# Patient Record
Sex: FEMALE | Race: WHITE | NOT HISPANIC OR LATINO | Employment: FULL TIME | URBAN - METROPOLITAN AREA
[De-identification: names, ages, dates, MRNs, and addresses within clinical notes are randomized per-mention and may not be internally consistent; named-entity substitution may affect disease eponyms.]

---

## 2017-06-22 ENCOUNTER — TRANSCRIBE ORDERS (OUTPATIENT)
Dept: ADMINISTRATIVE | Facility: HOSPITAL | Age: 72
End: 2017-06-22

## 2017-06-22 ENCOUNTER — HOSPITAL ENCOUNTER (OUTPATIENT)
Dept: GENERAL RADIOLOGY | Facility: HOSPITAL | Age: 72
Discharge: HOME | End: 2017-06-22
Admitting: NURSE PRACTITIONER

## 2017-06-22 DIAGNOSIS — R05.9 COUGH: Primary | ICD-10-CM

## 2017-06-22 DIAGNOSIS — R05.9 COUGH: ICD-10-CM

## 2017-06-22 PROCEDURE — 71020 HC CHEST PA AND LATERAL: CPT

## 2018-03-08 ENCOUNTER — OFFICE VISIT (OUTPATIENT)
Dept: INTERNAL MEDICINE | Facility: CLINIC | Age: 73
End: 2018-03-08

## 2018-03-08 VITALS
BODY MASS INDEX: 21.44 KG/M2 | OXYGEN SATURATION: 98 % | HEART RATE: 55 BPM | DIASTOLIC BLOOD PRESSURE: 78 MMHG | HEIGHT: 63 IN | SYSTOLIC BLOOD PRESSURE: 124 MMHG | WEIGHT: 121 LBS

## 2018-03-08 DIAGNOSIS — R73.03 PREDIABETES: ICD-10-CM

## 2018-03-08 DIAGNOSIS — E78.5 HYPERLIPIDEMIA, UNSPECIFIED HYPERLIPIDEMIA TYPE: Primary | ICD-10-CM

## 2018-03-08 DIAGNOSIS — I10 HYPERTENSION, UNSPECIFIED TYPE: ICD-10-CM

## 2018-03-08 LAB
ALBUMIN SERPL-MCNC: 5 G/DL (ref 3.5–5.2)
ALBUMIN/GLOB SERPL: 2.3 G/DL
ALP SERPL-CCNC: 89 U/L (ref 39–117)
ALT SERPL-CCNC: 16 U/L (ref 1–33)
AST SERPL-CCNC: 18 U/L (ref 1–32)
BILIRUB SERPL-MCNC: 0.4 MG/DL (ref 0.1–1.2)
BUN SERPL-MCNC: 17 MG/DL (ref 8–23)
BUN/CREAT SERPL: 23.9 (ref 7–25)
CALCIUM SERPL-MCNC: 10.1 MG/DL (ref 8.6–10.5)
CHLORIDE SERPL-SCNC: 103 MMOL/L (ref 98–107)
CHOLEST SERPL-MCNC: 168 MG/DL (ref 0–200)
CO2 SERPL-SCNC: 29.9 MMOL/L (ref 22–29)
CREAT SERPL-MCNC: 0.71 MG/DL (ref 0.57–1)
GFR SERPLBLD CREATININE-BSD FMLA CKD-EPI: 81 ML/MIN/1.73
GFR SERPLBLD CREATININE-BSD FMLA CKD-EPI: 98 ML/MIN/1.73
GLOBULIN SER CALC-MCNC: 2.2 GM/DL
GLUCOSE SERPL-MCNC: 88 MG/DL (ref 65–99)
HBA1C MFR BLD: 5.74 % (ref 4.8–5.6)
HDLC SERPL-MCNC: 57 MG/DL (ref 40–60)
LDLC SERPL CALC-MCNC: 92 MG/DL (ref 0–100)
LDLC/HDLC SERPL: 1.62 {RATIO}
POTASSIUM SERPL-SCNC: 4.3 MMOL/L (ref 3.5–5.2)
PROT SERPL-MCNC: 7.2 G/DL (ref 6–8.5)
SODIUM SERPL-SCNC: 145 MMOL/L (ref 136–145)
TRIGL SERPL-MCNC: 93 MG/DL (ref 0–150)
VLDLC SERPL CALC-MCNC: 18.6 MG/DL (ref 5–40)

## 2018-03-08 PROCEDURE — 99204 OFFICE O/P NEW MOD 45 MIN: CPT | Performed by: FAMILY MEDICINE

## 2018-03-08 RX ORDER — ACETAMINOPHEN 325 MG/1
650 TABLET ORAL DAILY PRN
COMMUNITY

## 2018-03-08 RX ORDER — UBIDECARENONE 100 MG
200 CAPSULE ORAL DAILY
COMMUNITY

## 2018-03-08 RX ORDER — FLUTICASONE PROPIONATE 50 MCG
2 SPRAY, SUSPENSION (ML) NASAL DAILY
COMMUNITY
End: 2018-09-07

## 2018-03-08 RX ORDER — SODIUM PHOSPHATE,MONO-DIBASIC 19G-7G/118
1500 ENEMA (ML) RECTAL DAILY
COMMUNITY

## 2018-03-08 RX ORDER — MELOXICAM 15 MG/1
1 TABLET ORAL DAILY
Refills: 5 | COMMUNITY
Start: 2018-02-02 | End: 2018-08-02 | Stop reason: SDUPTHER

## 2018-03-08 RX ORDER — ACETAMINOPHEN,DIPHENHYDRAMINE HCL 500; 25 MG/1; MG/1
1 TABLET, FILM COATED ORAL NIGHTLY PRN
COMMUNITY

## 2018-03-08 RX ORDER — PRAVASTATIN SODIUM 20 MG
1 TABLET ORAL DAILY
Refills: 5 | COMMUNITY
Start: 2018-02-02 | End: 2018-05-02 | Stop reason: SDUPTHER

## 2018-03-08 NOTE — PROGRESS NOTES
Subjective   Gaby Jackson is a 72 y.o. female.     Chief Complaint   Patient presents with   • New Patient Visit     previous PCP: JOHN Chavez   • Hypertension   • Hyperlipidemia   • Asthma   • Arthritis         History of Present Illness     Past medical history of hypertension.  Patient's currently taken hydrochlorothiazide 12.5 mg daily.  He is also taking metoprolol succinate XL 50 mg daily.  She currently denies any side effects of medication.  She notes that she is doing well with this medicine.    Patient's past medical history of hyperlipidemia.  She is currently taking atorvastatin 20 mg daily.  She notes that she is doing well on this medication.    Patient notes that her prior hemoglobin A1c was 5.9, was told that she had prediabetes.    The following portions of the patient's history were reviewed and updated as appropriate: allergies, current medications, past family history, past medical history, past social history, past surgical history and problem list.    Review of Systems   Constitutional: Negative for chills and fever.   HENT: Negative for congestion, rhinorrhea, sinus pain and sore throat.    Eyes: Negative for photophobia and visual disturbance.   Respiratory: Negative for cough, chest tightness and shortness of breath.    Cardiovascular: Negative for chest pain and palpitations.   Gastrointestinal: Negative for diarrhea, nausea and vomiting.   Genitourinary: Negative for dysuria, frequency and urgency.   Skin: Negative for rash and wound.   Neurological: Negative for dizziness and syncope.   Psychiatric/Behavioral: Negative for behavioral problems and confusion.       Objective   Physical Exam   Constitutional: She is oriented to person, place, and time. She appears well-developed and well-nourished.   HENT:   Head: Normocephalic and atraumatic.   Right Ear: External ear normal.   Left Ear: External ear normal.   Mouth/Throat: Oropharynx is clear and moist.   Eyes: EOM are normal.    Neck: Normal range of motion. Neck supple.   Cardiovascular: Normal rate, regular rhythm and normal heart sounds.    Pulmonary/Chest: Effort normal and breath sounds normal. No respiratory distress.   Musculoskeletal: Normal range of motion.   Lymphadenopathy:     She has no cervical adenopathy.   Neurological: She is alert and oriented to person, place, and time.   Skin: Skin is warm.   Psychiatric: She has a normal mood and affect. Her behavior is normal.   Nursing note and vitals reviewed.      Assessment/Plan   Gaby was seen today for new patient visit, hypertension, hyperlipidemia, asthma and arthritis.    Diagnoses and all orders for this visit:    Hyperlipidemia, unspecified hyperlipidemia type  -     Lipid Panel With LDL / HDL Ratio  -     Continue pravastatin 20 mg daily.    Hypertension, unspecified type  -     Comprehensive Metabolic Panel  -     We'll treat with metoprolol succinate XL daily hydrochlorothiazide 12.5 mg daily.    Prediabetes  -     Hemoglobin A1c          No Follow-up on file.    Dictated utilizing Dragon Voice Recognition Software

## 2018-03-09 ENCOUNTER — TELEPHONE (OUTPATIENT)
Dept: INTERNAL MEDICINE | Facility: CLINIC | Age: 73
End: 2018-03-09

## 2018-03-09 NOTE — TELEPHONE ENCOUNTER
----- Message from Maicol Florentino MD sent at 3/9/2018 11:07 AM EST -----  The labs were reviewed. Please inform patient that labs were normal.

## 2018-05-03 RX ORDER — PRAVASTATIN SODIUM 20 MG
TABLET ORAL
Qty: 90 TABLET | Refills: 1 | Status: SHIPPED | OUTPATIENT
Start: 2018-05-03 | End: 2018-09-10 | Stop reason: DRUGHIGH

## 2018-05-03 RX ORDER — METOPROLOL SUCCINATE 50 MG/1
TABLET, EXTENDED RELEASE ORAL
Qty: 90 TABLET | Refills: 1 | Status: SHIPPED | OUTPATIENT
Start: 2018-05-03 | End: 2018-10-29 | Stop reason: SDUPTHER

## 2018-06-01 RX ORDER — HYDROCHLOROTHIAZIDE 25 MG/1
12.5 TABLET ORAL DAILY
Qty: 45 TABLET | Refills: 1 | Status: SHIPPED | OUTPATIENT
Start: 2018-06-01 | End: 2018-11-28 | Stop reason: SDUPTHER

## 2018-08-02 RX ORDER — MELOXICAM 15 MG/1
TABLET ORAL
Qty: 90 TABLET | Refills: 1 | Status: SHIPPED | OUTPATIENT
Start: 2018-08-02 | End: 2019-01-30 | Stop reason: SDUPTHER

## 2018-09-07 ENCOUNTER — OFFICE VISIT (OUTPATIENT)
Dept: INTERNAL MEDICINE | Facility: CLINIC | Age: 73
End: 2018-09-07

## 2018-09-07 VITALS
OXYGEN SATURATION: 97 % | SYSTOLIC BLOOD PRESSURE: 134 MMHG | DIASTOLIC BLOOD PRESSURE: 80 MMHG | BODY MASS INDEX: 21.72 KG/M2 | WEIGHT: 122.6 LBS | HEART RATE: 74 BPM | HEIGHT: 63 IN

## 2018-09-07 DIAGNOSIS — Z13.29 SCREENING FOR THYROID DISORDER: ICD-10-CM

## 2018-09-07 DIAGNOSIS — E78.5 HYPERLIPIDEMIA, UNSPECIFIED HYPERLIPIDEMIA TYPE: ICD-10-CM

## 2018-09-07 DIAGNOSIS — Z12.39 SCREENING FOR BREAST CANCER: ICD-10-CM

## 2018-09-07 DIAGNOSIS — Z00.00 MEDICARE ANNUAL WELLNESS VISIT, INITIAL: Primary | ICD-10-CM

## 2018-09-07 DIAGNOSIS — E55.9 AVITAMINOSIS D: ICD-10-CM

## 2018-09-07 DIAGNOSIS — I10 HYPERTENSION, UNSPECIFIED TYPE: ICD-10-CM

## 2018-09-07 DIAGNOSIS — R73.9 BLOOD GLUCOSE ELEVATED: ICD-10-CM

## 2018-09-07 DIAGNOSIS — Z11.59 ENCOUNTER FOR HEPATITIS C SCREENING TEST FOR LOW RISK PATIENT: ICD-10-CM

## 2018-09-07 PROBLEM — R53.83 FATIGUE: Status: ACTIVE | Noted: 2018-09-07

## 2018-09-07 PROBLEM — R51.9 CEPHALALGIA: Status: ACTIVE | Noted: 2018-09-07

## 2018-09-07 PROCEDURE — G0439 PPPS, SUBSEQ VISIT: HCPCS | Performed by: FAMILY MEDICINE

## 2018-09-07 RX ORDER — MINOCYCLINE HYDROCHLORIDE 50 MG/1
50 CAPSULE ORAL DAILY
Refills: 0 | COMMUNITY
Start: 2018-09-03

## 2018-09-07 RX ORDER — CHLORAL HYDRATE 500 MG
1000 CAPSULE ORAL
COMMUNITY

## 2018-09-07 NOTE — PROGRESS NOTES
QUICK REFERENCE INFORMATION:  The ABCs of the Annual Wellness Visit    Initial Medicare Wellness Visit    HEALTH RISK ASSESSMENT    1945    Recent Hospitalizations:  No hospitalization(s) within the last year..        Current Medical Providers:  Patient Care Team:  Maicol Florentino MD as PCP - General (Family Medicine)  Marty Atkinson MD as PCP - Claims Attributed        Smoking Status:  History   Smoking Status   • Never Smoker   Smokeless Tobacco   • Never Used       Alcohol Consumption:  History   Alcohol Use No       Depression Screen:   PHQ-2/PHQ-9 Depression Screening 9/7/2018   Little interest or pleasure in doing things 0   Feeling down, depressed, or hopeless 0   Trouble falling or staying asleep, or sleeping too much 0   Feeling tired or having little energy 1   Poor appetite or overeating 1   Feeling bad about yourself - or that you are a failure or have let yourself or your family down 1   Trouble concentrating on things, such as reading the newspaper or watching television 0   Moving or speaking so slowly that other people could have noticed. Or the opposite - being so fidgety or restless that you have been moving around a lot more than usual 0   Thoughts that you would be better off dead, or of hurting yourself in some way 0   Total Score 3   If you checked off any problems, how difficult have these problems made it for you to do your work, take care of things at home, or get along with other people? Not difficult at all       Health Habits and Functional and Cognitive Screening:  Functional & Cognitive Status 9/7/2018   Do you have difficulty preparing food and eating? No   Do you have difficulty bathing yourself, getting dressed or grooming yourself? No   Do you have difficulty using the toilet? No   Do you have difficulty moving around from place to place? No   Do you have trouble with steps or getting out of a bed or a chair? No   In the past year have you fallen or experienced a near fall?  No   Current Diet Well Balanced Diet   Dental Exam Up to date   Eye Exam Not up to date   Exercise (times per week) 7 times per week   Current Exercise Activities Include Walking   Do you need help using the phone?  No   Are you deaf or do you have serious difficulty hearing?  No   Do you need help with transportation? No   Do you need help shopping? No   Do you need help preparing meals?  No   Do you need help with housework?  No   Do you need help with laundry? No   Do you need help taking your medications? No   Do you need help managing money? No   Do you ever drive or ride in a car without wearing a seat belt? No   Have you felt unusual stress, anger or loneliness in the last month? No   Who do you live with? Alone   If you need help, do you have trouble finding someone available to you? No   Have you been bothered in the last four weeks by sexual problems? No   Do you have difficulty concentrating, remembering or making decisions? No           Does the patient have evidence of cognitive impairment? No    Asiprin use counseling: Start ASA 81 mg daily       Recent Lab Results:    Visual Acuity:  No exam data present    Age-appropriate Screening Schedule:  Refer to the list below for future screening recommendations based on patient's age, sex and/or medical conditions. Orders for these recommended tests are listed in the plan section. The patient has been provided with a written plan.    Health Maintenance   Topic Date Due   • TDAP/TD VACCINES (1 - Tdap) 09/03/1965   • ZOSTER VACCINE (1 of 2) 09/03/1996   • PNEUMOCOCCAL VACCINES (65+ LOW/MEDIUM RISK) (2 of 2 - PPSV23) 01/01/2012   • MAMMOGRAM  03/08/2018   • COLONOSCOPY  03/08/2018   • INFLUENZA VACCINE  08/01/2018   • LIPID PANEL  03/08/2019        Subjective   History of Present Illness    Gaby Jackson is a 73 y.o. female who presents for an Annual Wellness Visit.    The following portions of the patient's history were reviewed and updated as appropriate:  allergies, current medications, past family history, past medical history, past social history, past surgical history and problem list.    Outpatient Medications Prior to Visit   Medication Sig Dispense Refill   • acetaminophen (TYLENOL) 325 MG tablet Take 650 mg by mouth Daily As Needed for Mild Pain .     • amitriptyline (ELAVIL) 10 MG tablet Take 10 mg by mouth At Night As Needed.     • BIOTIN PO Take 2 tablets by mouth Daily.     • BREO ELLIPTA 200-25 MCG/INH aerosol powder  1 PUFF INHALED DAILY 180 each 1   • Cholecalciferol (VITAMIN D-3 PO) Take 1 tablet by mouth Daily.     • coenzyme Q10 100 MG capsule Take 200 mg by mouth Daily.     • diphenhydrAMINE-acetaminophen (TYLENOL PM)  MG tablet per tablet Take 1 tablet by mouth At Night As Needed for Sleep.     • glucosamine sulfate 500 MG capsule capsule Take 1,500 mg by mouth Daily.     • hydrochlorothiazide (HYDRODIURIL) 25 MG tablet Take 0.5 tablets by mouth Daily. 45 tablet 1   • meloxicam (MOBIC) 15 MG tablet TAKE 1 TABLET BY MOUTH ONCE A DAY FOR 30 DAYS 90 tablet 1   • metoprolol succinate XL (TOPROL-XL) 50 MG 24 hr tablet TAKE ONE TABLET BY MOUTH ONCE DAILY ONCE A DAY ORALLY 90 DAYS 90 tablet 1   • pravastatin (PRAVACHOL) 20 MG tablet 1 TABLET ONCE A DAY ORALLY 30 DAYS 90 tablet 1   • fluticasone (FLONASE) 50 MCG/ACT nasal spray 2 sprays into each nostril Daily.       No facility-administered medications prior to visit.        Patient Active Problem List   Diagnosis   • Hypertension   • Hyperlipidemia   • Avitaminosis D   • Diplopia   • Labyrinthitis of left ear   • Fatigue   • Cephalalgia   • Blood glucose elevated       Advance Care Planning:  has NO advance directive - information provided to the patient today    Identification of Risk Factors:  Risk factors include: unhealthy diet, cardiovascular risk and inactivity.    Review of Systems    Compared to one year ago, the patient feels her physical health is the same.  Compared to one year ago, the  "patient feels her mental health is the same.    Objective     Physical Exam    Vitals:    09/07/18 0856   BP: 134/80   Pulse: 74   SpO2: 97%   Weight: 55.6 kg (122 lb 9.6 oz)   Height: 160 cm (63\")   PainSc: 0-No pain       Patient's Body mass index is 21.72 kg/m². BMI is within normal parameters. No follow-up required.      Assessment/Plan   Patient Self-Management and Personalized Health Advice  The patient has been provided with information about: diet, exercise, weight management, prevention of cardiac or vascular disease and designing advance directives and preventive services including:   · Advance directive, Colorectal cancer screening, colonoscopy referral placed, Diabetes screening, see lab orders, Exercise counseling provided, Influenza vaccine, Nutrition counseling provided, Pneumococcal vaccine , Screening mammography, referral placed, TdaP vaccine.    Visit Diagnoses:    ICD-10-CM ICD-9-CM   1. Medicare annual wellness visit, initial Z00.00 V70.0   2. Screening for breast cancer Z12.31 V76.10   3. Avitaminosis D E55.9 268.9   4. Hypertension, unspecified type I10 401.9   5. Hyperlipidemia, unspecified hyperlipidemia type E78.5 272.4   6. Blood glucose elevated R73.9 790.29   7. Screening for thyroid disorder Z13.29 V77.0   8. Encounter for hepatitis C screening test for low risk patient Z11.59 V73.89       Orders Placed This Encounter   Procedures   • Mammo Screening Bilateral With CAD     Order Specific Question:   Reason for Exam:     Answer:   screening for breast cancer   • Comprehensive Metabolic Panel   • Thyroid Panel With TSH   • Lipid Panel With LDL / HDL Ratio   • Vitamin D 25 Hydroxy   • Hemoglobin A1c   • Hepatitis C Antibody   • CBC & Differential     Order Specific Question:   Manual Differential     Answer:   No       Outpatient Encounter Prescriptions as of 9/7/2018   Medication Sig Dispense Refill   • acetaminophen (TYLENOL) 325 MG tablet Take 650 mg by mouth Daily As Needed for Mild " Pain .     • amitriptyline (ELAVIL) 10 MG tablet Take 10 mg by mouth At Night As Needed.     • BIOTIN PO Take 2 tablets by mouth Daily.     • BREO ELLIPTA 200-25 MCG/INH aerosol powder  1 PUFF INHALED DAILY 180 each 1   • Cholecalciferol (VITAMIN D-3 PO) Take 1 tablet by mouth Daily.     • coenzyme Q10 100 MG capsule Take 200 mg by mouth Daily.     • diphenhydrAMINE-acetaminophen (TYLENOL PM)  MG tablet per tablet Take 1 tablet by mouth At Night As Needed for Sleep.     • glucosamine sulfate 500 MG capsule capsule Take 1,500 mg by mouth Daily.     • hydrochlorothiazide (HYDRODIURIL) 25 MG tablet Take 0.5 tablets by mouth Daily. 45 tablet 1   • meloxicam (MOBIC) 15 MG tablet TAKE 1 TABLET BY MOUTH ONCE A DAY FOR 30 DAYS 90 tablet 1   • metoprolol succinate XL (TOPROL-XL) 50 MG 24 hr tablet TAKE ONE TABLET BY MOUTH ONCE DAILY ONCE A DAY ORALLY 90 DAYS 90 tablet 1   • Omega-3 Fatty Acids (FISH OIL) 1000 MG capsule capsule Take 1,000 mg by mouth Daily With Breakfast.     • pravastatin (PRAVACHOL) 20 MG tablet 1 TABLET ONCE A DAY ORALLY 30 DAYS 90 tablet 1   • Probiotic Product (PROBIOTIC PO) Take 1 tablet by mouth Daily.     • minocycline (MINOCIN,DYNACIN) 50 MG capsule Take 50 mg by mouth Daily.  0   • [DISCONTINUED] fluticasone (FLONASE) 50 MCG/ACT nasal spray 2 sprays into each nostril Daily.       No facility-administered encounter medications on file as of 9/7/2018.        Reviewed use of high risk medication in the elderly: yes  Reviewed for potential of harmful drug interactions in the elderly: yes    Follow Up:  No Follow-up on file.     An After Visit Summary and PPPS with all of these plans were given to the patient.

## 2018-09-08 LAB
25(OH)D3+25(OH)D2 SERPL-MCNC: 36.4 NG/ML (ref 30–100)
ALBUMIN SERPL-MCNC: 5 G/DL (ref 3.5–5.2)
ALBUMIN/GLOB SERPL: 2.4 G/DL
ALP SERPL-CCNC: 95 U/L (ref 39–117)
ALT SERPL-CCNC: 20 U/L (ref 1–33)
AST SERPL-CCNC: 20 U/L (ref 1–32)
BASOPHILS # BLD AUTO: 0.04 10*3/MM3 (ref 0–0.2)
BASOPHILS NFR BLD AUTO: 0.7 % (ref 0–1.5)
BILIRUB SERPL-MCNC: 0.5 MG/DL (ref 0.1–1.2)
BUN SERPL-MCNC: 21 MG/DL (ref 8–23)
BUN/CREAT SERPL: 28.4 (ref 7–25)
CALCIUM SERPL-MCNC: 10 MG/DL (ref 8.6–10.5)
CHLORIDE SERPL-SCNC: 101 MMOL/L (ref 98–107)
CHOLEST SERPL-MCNC: 201 MG/DL (ref 0–200)
CO2 SERPL-SCNC: 28.8 MMOL/L (ref 22–29)
CREAT SERPL-MCNC: 0.74 MG/DL (ref 0.57–1)
EOSINOPHIL # BLD AUTO: 0.06 10*3/MM3 (ref 0–0.7)
EOSINOPHIL NFR BLD AUTO: 1.1 % (ref 0.3–6.2)
ERYTHROCYTE [DISTWIDTH] IN BLOOD BY AUTOMATED COUNT: 13.6 % (ref 11.7–13)
FT4I SERPL CALC-MCNC: 1.8 (ref 1.2–4.9)
GLOBULIN SER CALC-MCNC: 2.1 GM/DL
GLUCOSE SERPL-MCNC: 85 MG/DL (ref 65–99)
HBA1C MFR BLD: 5.39 % (ref 4.8–5.6)
HCT VFR BLD AUTO: 38.3 % (ref 35.6–45.5)
HCV AB S/CO SERPL IA: 0.1 S/CO RATIO (ref 0–0.9)
HDLC SERPL-MCNC: 66 MG/DL (ref 40–60)
HGB BLD-MCNC: 12.2 G/DL (ref 11.9–15.5)
IMM GRANULOCYTES # BLD: 0 10*3/MM3 (ref 0–0.03)
IMM GRANULOCYTES NFR BLD: 0 % (ref 0–0.5)
LDLC SERPL CALC-MCNC: 122 MG/DL (ref 0–100)
LDLC/HDLC SERPL: 1.84 {RATIO}
LYMPHOCYTES # BLD AUTO: 0.92 10*3/MM3 (ref 0.9–4.8)
LYMPHOCYTES NFR BLD AUTO: 16.3 % (ref 19.6–45.3)
MCH RBC QN AUTO: 28.4 PG (ref 26.9–32)
MCHC RBC AUTO-ENTMCNC: 31.9 G/DL (ref 32.4–36.3)
MCV RBC AUTO: 89.3 FL (ref 80.5–98.2)
MONOCYTES # BLD AUTO: 0.65 10*3/MM3 (ref 0.2–1.2)
MONOCYTES NFR BLD AUTO: 11.5 % (ref 5–12)
NEUTROPHILS # BLD AUTO: 3.96 10*3/MM3 (ref 1.9–8.1)
NEUTROPHILS NFR BLD AUTO: 70.4 % (ref 42.7–76)
PLATELET # BLD AUTO: 276 10*3/MM3 (ref 140–500)
POTASSIUM SERPL-SCNC: 4.2 MMOL/L (ref 3.5–5.2)
PROT SERPL-MCNC: 7.1 G/DL (ref 6–8.5)
RBC # BLD AUTO: 4.29 10*6/MM3 (ref 3.9–5.2)
SODIUM SERPL-SCNC: 141 MMOL/L (ref 136–145)
T3RU NFR SERPL: 24 % (ref 24–39)
T4 SERPL-MCNC: 7.5 UG/DL (ref 4.5–12)
TRIGL SERPL-MCNC: 67 MG/DL (ref 0–150)
TSH SERPL DL<=0.005 MIU/L-ACNC: 2.88 UIU/ML (ref 0.45–4.5)
VLDLC SERPL CALC-MCNC: 13.4 MG/DL (ref 5–40)
WBC # BLD AUTO: 5.63 10*3/MM3 (ref 4.5–10.7)

## 2018-09-10 ENCOUNTER — TELEPHONE (OUTPATIENT)
Dept: INTERNAL MEDICINE | Facility: CLINIC | Age: 73
End: 2018-09-10

## 2018-09-10 RX ORDER — PRAVASTATIN SODIUM 40 MG
40 TABLET ORAL DAILY
Qty: 90 TABLET | Refills: 1 | Status: SHIPPED | OUTPATIENT
Start: 2018-09-10 | End: 2019-05-02 | Stop reason: SDUPTHER

## 2018-09-10 NOTE — TELEPHONE ENCOUNTER
----- Message from Maicol Florentino MD sent at 9/10/2018 10:39 AM EDT -----  Please inform the patient of the following abnormal results.  Increase pravastatin to 40 mg daily.

## 2018-10-29 RX ORDER — METOPROLOL SUCCINATE 50 MG/1
50 TABLET, EXTENDED RELEASE ORAL DAILY
Qty: 90 TABLET | Refills: 1 | Status: SHIPPED | OUTPATIENT
Start: 2018-10-29 | End: 2019-04-26 | Stop reason: SDUPTHER

## 2018-11-28 RX ORDER — HYDROCHLOROTHIAZIDE 25 MG/1
12.5 TABLET ORAL DAILY
Qty: 45 TABLET | Refills: 1 | Status: SHIPPED | OUTPATIENT
Start: 2018-11-28 | End: 2019-05-19 | Stop reason: SDUPTHER

## 2019-01-30 RX ORDER — MELOXICAM 15 MG/1
TABLET ORAL
Qty: 90 TABLET | Refills: 1 | Status: SHIPPED | OUTPATIENT
Start: 2019-01-30 | End: 2019-07-17 | Stop reason: SDUPTHER

## 2019-03-07 ENCOUNTER — OFFICE VISIT (OUTPATIENT)
Dept: INTERNAL MEDICINE | Facility: CLINIC | Age: 74
End: 2019-03-07

## 2019-03-07 VITALS
WEIGHT: 124.3 LBS | DIASTOLIC BLOOD PRESSURE: 80 MMHG | BODY MASS INDEX: 22.02 KG/M2 | OXYGEN SATURATION: 95 % | SYSTOLIC BLOOD PRESSURE: 142 MMHG | HEIGHT: 63 IN | HEART RATE: 51 BPM

## 2019-03-07 DIAGNOSIS — I10 HYPERTENSION, UNSPECIFIED TYPE: ICD-10-CM

## 2019-03-07 DIAGNOSIS — M17.0 PRIMARY OSTEOARTHRITIS OF BOTH KNEES: ICD-10-CM

## 2019-03-07 DIAGNOSIS — E78.5 HYPERLIPIDEMIA, UNSPECIFIED HYPERLIPIDEMIA TYPE: Primary | ICD-10-CM

## 2019-03-07 LAB
ALBUMIN SERPL-MCNC: 4.4 G/DL (ref 3.5–5.2)
ALBUMIN/GLOB SERPL: 2 G/DL
ALP SERPL-CCNC: 85 U/L (ref 39–117)
ALT SERPL-CCNC: 18 U/L (ref 1–33)
AST SERPL-CCNC: 18 U/L (ref 1–32)
BILIRUB SERPL-MCNC: 0.4 MG/DL (ref 0.1–1.2)
BUN SERPL-MCNC: 17 MG/DL (ref 8–23)
BUN/CREAT SERPL: 23.9 (ref 7–25)
CALCIUM SERPL-MCNC: 10.1 MG/DL (ref 8.6–10.5)
CHLORIDE SERPL-SCNC: 102 MMOL/L (ref 98–107)
CHOLEST SERPL-MCNC: 166 MG/DL (ref 0–200)
CO2 SERPL-SCNC: 28.2 MMOL/L (ref 22–29)
CREAT SERPL-MCNC: 0.71 MG/DL (ref 0.57–1)
GLOBULIN SER CALC-MCNC: 2.2 GM/DL
GLUCOSE SERPL-MCNC: 88 MG/DL (ref 65–99)
HDLC SERPL-MCNC: 54 MG/DL (ref 40–60)
LDLC SERPL CALC-MCNC: 94 MG/DL (ref 0–100)
LDLC/HDLC SERPL: 1.74 {RATIO}
POTASSIUM SERPL-SCNC: 4.1 MMOL/L (ref 3.5–5.2)
PROT SERPL-MCNC: 6.6 G/DL (ref 6–8.5)
SODIUM SERPL-SCNC: 142 MMOL/L (ref 136–145)
TRIGL SERPL-MCNC: 90 MG/DL (ref 0–150)
VLDLC SERPL CALC-MCNC: 18 MG/DL (ref 5–40)

## 2019-03-07 PROCEDURE — 99214 OFFICE O/P EST MOD 30 MIN: CPT | Performed by: FAMILY MEDICINE

## 2019-03-07 RX ORDER — TRIAMCINOLONE ACETONIDE 1 MG/G
CREAM TOPICAL
Refills: 0 | COMMUNITY
Start: 2019-01-24

## 2019-03-07 NOTE — PROGRESS NOTES
Subjective   Gaby Jackson is a 73 y.o. female.     Chief Complaint   Patient presents with   • Hypertension     6 month follow up   • Hyperlipidemia     6 month follow up   • Headache         History of Present Illness     Patient is a past medical history for hyperlipidemia.  Patient is currently stable.  She is currently taking pravastatin 40 mg daily.  She denies any side effects of the medication.    Patient has essential hypertension.  Patient is currently taking hydrochlorothiazide 12.5 mg daily and metoprolol succinate XL 50 mg daily.  She denies any side effects of the medication.  Patient blood pressure is 142/80.  Patient states the medication seems to be working well for her.  Blood pressure is slightly elevated, could be related to her arthritic pain.    She states that she has pain in her knees due to osteoarthritis.  Patient states that she is currently taking meloxicam 15 mg daily, which seems to be helping her.  She states that it is not bad to the point where she needs to get steroid injections.  She states is worse after standing long days at work.    The following portions of the patient's history were reviewed and updated as appropriate: allergies, current medications, past family history, past medical history, past social history, past surgical history and problem list.    Review of Systems   Constitutional: Negative for chills and fever.   HENT: Negative for congestion, rhinorrhea, sinus pain and sore throat.    Eyes: Negative for photophobia and visual disturbance.   Respiratory: Negative for cough, chest tightness and shortness of breath.    Cardiovascular: Negative for chest pain and palpitations.   Gastrointestinal: Negative for diarrhea, nausea and vomiting.   Genitourinary: Negative for dysuria, frequency and urgency.   Skin: Negative for rash and wound.   Neurological: Negative for dizziness and syncope.   Psychiatric/Behavioral: Negative for behavioral problems and confusion.        Objective   Physical Exam   Constitutional: She is oriented to person, place, and time. She appears well-developed and well-nourished.   HENT:   Head: Normocephalic and atraumatic.   Right Ear: External ear normal.   Left Ear: External ear normal.   Mouth/Throat: Oropharynx is clear and moist.   Eyes: EOM are normal.   Neck: Normal range of motion. Neck supple.   Cardiovascular: Normal rate, regular rhythm and normal heart sounds.   Pulmonary/Chest: Effort normal and breath sounds normal. No respiratory distress.   Musculoskeletal: Normal range of motion.   Lymphadenopathy:     She has no cervical adenopathy.   Neurological: She is alert and oriented to person, place, and time.   Skin: Skin is warm.   Psychiatric: She has a normal mood and affect. Her behavior is normal.   Nursing note and vitals reviewed.      Assessment/Plan   Gaby was seen today for hypertension, hyperlipidemia and headache.    Diagnoses and all orders for this visit:    Hyperlipidemia, unspecified hyperlipidemia type  -     Comprehensive Metabolic Panel  -     Lipid Panel With LDL / HDL Ratio  -     Stable we will continue current medications.    Hypertension, unspecified type  -     Comprehensive Metabolic Panel  -     Stable, will continue current medication.    Primary osteoarthritis of both knees        -     Discussed patient she is to continue the meloxicam.  If her signs and symptoms worsen, patient may need steroid injections in the knee.          No Follow-up on file.    Dictated utilizing Dragon Voice Recognition Software

## 2019-04-26 RX ORDER — METOPROLOL SUCCINATE 50 MG/1
TABLET, EXTENDED RELEASE ORAL
Qty: 90 TABLET | Refills: 1 | Status: SHIPPED | OUTPATIENT
Start: 2019-04-26 | End: 2019-10-17 | Stop reason: SDUPTHER

## 2019-05-02 RX ORDER — PRAVASTATIN SODIUM 40 MG
TABLET ORAL
Qty: 90 TABLET | Refills: 1 | Status: SHIPPED | OUTPATIENT
Start: 2019-05-02 | End: 2019-10-29 | Stop reason: SDUPTHER

## 2019-05-20 RX ORDER — HYDROCHLOROTHIAZIDE 25 MG/1
12.5 TABLET ORAL DAILY
Qty: 45 TABLET | Refills: 1 | Status: SHIPPED | OUTPATIENT
Start: 2019-05-20 | End: 2019-11-07 | Stop reason: SDUPTHER

## 2019-07-17 RX ORDER — MELOXICAM 15 MG/1
TABLET ORAL
Qty: 90 TABLET | Refills: 1 | Status: SHIPPED | OUTPATIENT
Start: 2019-07-17 | End: 2019-11-07 | Stop reason: SDUPTHER

## 2019-10-17 RX ORDER — METOPROLOL SUCCINATE 50 MG/1
TABLET, EXTENDED RELEASE ORAL
Qty: 90 TABLET | Refills: 1 | Status: SHIPPED | OUTPATIENT
Start: 2019-10-17 | End: 2019-11-07 | Stop reason: SDUPTHER

## 2019-10-29 RX ORDER — PRAVASTATIN SODIUM 40 MG
TABLET ORAL
Qty: 90 TABLET | Refills: 1 | Status: SHIPPED | OUTPATIENT
Start: 2019-10-29 | End: 2019-11-07 | Stop reason: SDUPTHER

## 2019-11-07 ENCOUNTER — OFFICE VISIT (OUTPATIENT)
Dept: INTERNAL MEDICINE | Facility: CLINIC | Age: 74
End: 2019-11-07

## 2019-11-07 ENCOUNTER — RESULTS ENCOUNTER (OUTPATIENT)
Dept: INTERNAL MEDICINE | Facility: CLINIC | Age: 74
End: 2019-11-07

## 2019-11-07 VITALS
OXYGEN SATURATION: 99 % | HEART RATE: 51 BPM | DIASTOLIC BLOOD PRESSURE: 66 MMHG | HEIGHT: 64 IN | WEIGHT: 121 LBS | SYSTOLIC BLOOD PRESSURE: 128 MMHG | BODY MASS INDEX: 20.66 KG/M2 | TEMPERATURE: 97.9 F

## 2019-11-07 DIAGNOSIS — Z12.11 SCREENING FOR COLON CANCER: ICD-10-CM

## 2019-11-07 DIAGNOSIS — I10 HYPERTENSION, UNSPECIFIED TYPE: ICD-10-CM

## 2019-11-07 DIAGNOSIS — Z00.00 MEDICARE ANNUAL WELLNESS VISIT, SUBSEQUENT: Primary | ICD-10-CM

## 2019-11-07 DIAGNOSIS — E78.5 HYPERLIPIDEMIA, UNSPECIFIED HYPERLIPIDEMIA TYPE: ICD-10-CM

## 2019-11-07 PROCEDURE — G0439 PPPS, SUBSEQ VISIT: HCPCS | Performed by: FAMILY MEDICINE

## 2019-11-07 PROCEDURE — 99213 OFFICE O/P EST LOW 20 MIN: CPT | Performed by: FAMILY MEDICINE

## 2019-11-07 RX ORDER — MELOXICAM 15 MG/1
15 TABLET ORAL DAILY
Qty: 90 TABLET | Refills: 1 | Status: SHIPPED | OUTPATIENT
Start: 2019-11-07 | End: 2020-08-06

## 2019-11-07 RX ORDER — HYDROCHLOROTHIAZIDE 25 MG/1
12.5 TABLET ORAL DAILY
Qty: 45 TABLET | Refills: 3 | Status: SHIPPED | OUTPATIENT
Start: 2019-11-07 | End: 2020-07-22 | Stop reason: SDUPTHER

## 2019-11-07 RX ORDER — METOPROLOL SUCCINATE 50 MG/1
50 TABLET, EXTENDED RELEASE ORAL DAILY
Qty: 90 TABLET | Refills: 3 | Status: SHIPPED | OUTPATIENT
Start: 2019-11-07 | End: 2020-07-22 | Stop reason: SDUPTHER

## 2019-11-07 RX ORDER — PRAVASTATIN SODIUM 40 MG
40 TABLET ORAL DAILY
Qty: 90 TABLET | Refills: 3 | Status: SHIPPED | OUTPATIENT
Start: 2019-11-07 | End: 2020-07-22 | Stop reason: SDUPTHER

## 2019-11-07 NOTE — PROGRESS NOTES
The ABCs of the Annual Wellness Visit  Subsequent Medicare Wellness Visit    Chief Complaint   Patient presents with   • Medicare Wellness-subsequent       Subjective   History of Present Illness:  Gaby Jackson is a 74 y.o. female who presents for a Subsequent Medicare Wellness Visit.    Patient's past medical history for essential hypertension.  Blood pressure is 128/66.  She is currently taking metoprolol succinate XL 50 mg daily and hydrochlorothiazide 12.5 mg daily.  She denies any side effects of the medication.    Patient also has a past medical history of hyperlipidemia.  She is currently taking pravastatin 40 mg daily.  Patient denies any side effects of the medication.    HEALTH RISK ASSESSMENT    Recent Hospitalizations:  No hospitalization(s) within the last year.    Current Medical Providers:  Patient Care Team:  Maicol Florentino MD as PCP - General (Family Medicine)  Maicol Florentino MD as PCP - Claims Attributed    Smoking Status:  Social History     Tobacco Use   Smoking Status Never Smoker   Smokeless Tobacco Never Used       Alcohol Consumption:  Social History     Substance and Sexual Activity   Alcohol Use No       Depression Screen:   PHQ-2/PHQ-9 Depression Screening 11/7/2019   Little interest or pleasure in doing things 0   Feeling down, depressed, or hopeless 0   Trouble falling or staying asleep, or sleeping too much 0   Feeling tired or having little energy 0   Poor appetite or overeating 0   Feeling bad about yourself - or that you are a failure or have let yourself or your family down 0   Trouble concentrating on things, such as reading the newspaper or watching television 0   Moving or speaking so slowly that other people could have noticed. Or the opposite - being so fidgety or restless that you have been moving around a lot more than usual 0   Thoughts that you would be better off dead, or of hurting yourself in some way 0   Total Score 0   If you checked off any problems, how  difficult have these problems made it for you to do your work, take care of things at home, or get along with other people? -       Fall Risk Screen:  BRADLY Fall Risk Assessment has not been completed.    Health Habits and Functional and Cognitive Screening:  Functional & Cognitive Status 11/7/2019   Do you have difficulty preparing food and eating? No   Do you have difficulty bathing yourself, getting dressed or grooming yourself? No   Do you have difficulty using the toilet? No   Do you have difficulty moving around from place to place? No   Do you have trouble with steps or getting out of a bed or a chair? No   Current Diet Well Balanced Diet   Dental Exam Up to date   Eye Exam Up to date   Exercise (times per week) 7 times per week   Current Exercise Activities Include Walking   Do you need help using the phone?  No   Are you deaf or do you have serious difficulty hearing?  No   Do you need help with transportation? No   Do you need help shopping? No   Do you need help preparing meals?  No   Do you need help with housework?  No   Do you need help with laundry? No   Do you need help taking your medications? No   Do you need help managing money? No   Do you ever drive or ride in a car without wearing a seat belt? No   Have you felt unusual stress, anger or loneliness in the last month? No   Who do you live with? Alone   If you need help, do you have trouble finding someone available to you? No   Have you been bothered in the last four weeks by sexual problems? No   Do you have difficulty concentrating, remembering or making decisions? No         Does the patient have evidence of cognitive impairment? No    Asprin use counseling:Does not need ASA (and currently is not on it)    Age-appropriate Screening Schedule:  Refer to the list below for future screening recommendations based on patient's age, sex and/or medical conditions. Orders for these recommended tests are listed in the plan section. The patient has been  provided with a written plan.    Health Maintenance   Topic Date Due   • TDAP/TD VACCINES (1 - Tdap) 09/03/1965   • ZOSTER VACCINE (1 of 2) 09/03/1996   • PNEUMOCOCCAL VACCINES (65+ LOW/MEDIUM RISK) (2 of 2 - PPSV23) 01/01/2012   • MAMMOGRAM  03/08/2018   • COLONOSCOPY  03/08/2018   • LIPID PANEL  03/07/2020   • INFLUENZA VACCINE  Completed          The following portions of the patient's history were reviewed and updated as appropriate: allergies, current medications, past family history, past medical history, past social history, past surgical history and problem list.    Outpatient Medications Prior to Visit   Medication Sig Dispense Refill   • acetaminophen (TYLENOL) 325 MG tablet Take 650 mg by mouth Daily As Needed for Mild Pain .     • amitriptyline (ELAVIL) 10 MG tablet Take 10 mg by mouth At Night As Needed.     • BIOTIN PO Take 2 tablets by mouth Daily.     • coenzyme Q10 100 MG capsule Take 200 mg by mouth Daily.     • diphenhydrAMINE-acetaminophen (TYLENOL PM)  MG tablet per tablet Take 1 tablet by mouth At Night As Needed for Sleep.     • glucosamine sulfate 500 MG capsule capsule Take 1,500 mg by mouth Daily.     • meloxicam (MOBIC) 15 MG tablet TAKE 1 TABLET BY MOUTH ONCE A DAY 90 tablet 1   • minocycline (MINOCIN,DYNACIN) 50 MG capsule Take 50 mg by mouth Daily.  0   • Omega-3 Fatty Acids (FISH OIL) 1000 MG capsule capsule Take 1,000 mg by mouth Daily With Breakfast.     • ondansetron ODT (ZOFRAN-ODT) 4 MG disintegrating tablet Take 1 tablet by mouth Every 6 (Six) Hours As Needed for Nausea or Vomiting. 12 tablet 0   • triamcinolone (KENALOG) 0.1 % cream APPLY TO AFFECTED AREA OF EXTREMITIES TWICE A DAY AS NEEDED FOR ITCH  0   • hydrochlorothiazide (HYDRODIURIL) 25 MG tablet TAKE 0.5 TABLETS BY MOUTH DAILY. 45 tablet 1   • metoprolol succinate XL (TOPROL-XL) 50 MG 24 hr tablet TAKE 1 TABLET BY MOUTH EVERY DAY 90 tablet 1   • pravastatin (PRAVACHOL) 40 MG tablet TAKE 1 TABLET BY MOUTH EVERY DAY  "90 tablet 1     No facility-administered medications prior to visit.        Patient Active Problem List   Diagnosis   • Hypertension   • Hyperlipidemia   • Avitaminosis D   • Diplopia   • Labyrinthitis of left ear   • Fatigue   • Cephalalgia   • Blood glucose elevated   • Primary osteoarthritis of both knees       Advanced Care Planning:  Patient does not have an advance directive - information provided to the patient today    Review of Systems   Constitutional: Negative for chills and fever.   HENT: Negative for congestion, rhinorrhea, sinus pain and sore throat.    Eyes: Negative for photophobia and visual disturbance.   Respiratory: Negative for cough, chest tightness and shortness of breath.    Cardiovascular: Negative for chest pain and palpitations.   Gastrointestinal: Negative for diarrhea, nausea and vomiting.   Genitourinary: Negative for dysuria, frequency and urgency.   Skin: Negative for rash and wound.   Neurological: Negative for dizziness and syncope.   Psychiatric/Behavioral: Negative for behavioral problems and confusion.       Compared to one year ago, the patient feels her physical health is the same.  Compared to one year ago, the patient feels her mental health is the same.    Reviewed chart for potential of high risk medication in the elderly: yes  Reviewed chart for potential of harmful drug interactions in the elderly:yes    Objective         Vitals:    11/07/19 0926   BP: 128/66   BP Location: Left arm   Patient Position: Sitting   Pulse: 51   Temp: 97.9 °F (36.6 °C)   TempSrc: Oral   SpO2: 99%   Weight: 54.9 kg (121 lb)   Height: 161.3 cm (63.5\")   PainSc: 0-No pain       Body mass index is 21.1 kg/m².  Discussed the patient's BMI with her. The BMI is in the acceptable range.    Physical Exam   Constitutional: She is oriented to person, place, and time. She appears well-developed and well-nourished.   HENT:   Head: Normocephalic and atraumatic.   Eyes: EOM are normal.   Neck: Normal range " of motion. Neck supple.   Cardiovascular: Normal rate, regular rhythm and normal heart sounds.   Pulmonary/Chest: Effort normal and breath sounds normal. No respiratory distress.   Neurological: She is alert and oriented to person, place, and time.   Psychiatric: She has a normal mood and affect. Her behavior is normal.   Nursing note and vitals reviewed.            Assessment/Plan   Medicare Risks and Personalized Health Plan  CMS Preventative Services Quick Reference  Advance Directive Discussion  Breast Cancer/Mammogram Screening  Cardiovascular risk  Colon Cancer Screening  Immunizations Discussed/Encouraged (specific immunizations; Pneumococcal 23 )     Patient declined getting any mammogram done.  Patient denied getting a colonoscopy.  However she was okay of getting a Cologuard.    The above risks/problems have been discussed with the patient.  Pertinent information has been shared with the patient in the After Visit Summary.  Follow up plans and orders are seen below in the Assessment/Plan Section.    Diagnoses and all orders for this visit:    1. Medicare annual wellness visit, subsequent (Primary)    2. Screening for colon cancer  -     Cologuard - Stool, Per Rectum; Future    3. Hypertension, unspecified type  -     hydroCHLOROthiazide (HYDRODIURIL) 25 MG tablet; Take 0.5 tablets by mouth Daily.  Dispense: 45 tablet; Refill: 3  -     metoprolol succinate XL (TOPROL-XL) 50 MG 24 hr tablet; Take 1 tablet by mouth Daily.  Dispense: 90 tablet; Refill: 3  -     We will continue patient on hydrochlorothiazide and metoprolol succinate XL.  Patient is currently doing well on the medication.    4. Hyperlipidemia, unspecified hyperlipidemia type  -     pravastatin (PRAVACHOL) 40 MG tablet; Take 1 tablet by mouth Daily.  Dispense: 90 tablet; Refill: 3  -     Continue pravastatin 40 mg daily.      Follow Up:  No Follow-up on file.     An After Visit Summary and PPPS were given to the patient.

## 2020-05-28 RX ORDER — AMITRIPTYLINE HYDROCHLORIDE 10 MG/1
10 TABLET, FILM COATED ORAL NIGHTLY PRN
Qty: 90 TABLET | Refills: 0 | Status: SHIPPED | OUTPATIENT
Start: 2020-05-28 | End: 2020-07-22 | Stop reason: SDUPTHER

## 2020-07-07 RX ORDER — MELOXICAM 15 MG/1
TABLET ORAL
Qty: 90 TABLET | Refills: 1 | OUTPATIENT
Start: 2020-07-07

## 2020-07-22 ENCOUNTER — LAB (OUTPATIENT)
Dept: LAB | Facility: HOSPITAL | Age: 75
End: 2020-07-22

## 2020-07-22 ENCOUNTER — OFFICE VISIT (OUTPATIENT)
Dept: INTERNAL MEDICINE | Facility: CLINIC | Age: 75
End: 2020-07-22

## 2020-07-22 VITALS
WEIGHT: 122 LBS | HEART RATE: 57 BPM | DIASTOLIC BLOOD PRESSURE: 66 MMHG | OXYGEN SATURATION: 99 % | SYSTOLIC BLOOD PRESSURE: 142 MMHG | HEIGHT: 64 IN | RESPIRATION RATE: 16 BRPM | TEMPERATURE: 98.1 F | BODY MASS INDEX: 20.83 KG/M2

## 2020-07-22 DIAGNOSIS — E78.5 HYPERLIPIDEMIA, UNSPECIFIED HYPERLIPIDEMIA TYPE: ICD-10-CM

## 2020-07-22 DIAGNOSIS — Z12.11 SCREENING FOR COLON CANCER: ICD-10-CM

## 2020-07-22 DIAGNOSIS — F51.01 PRIMARY INSOMNIA: ICD-10-CM

## 2020-07-22 DIAGNOSIS — I10 HYPERTENSION, UNSPECIFIED TYPE: Primary | ICD-10-CM

## 2020-07-22 LAB
ALBUMIN SERPL-MCNC: 4.1 G/DL (ref 3.5–5.2)
ALBUMIN/GLOB SERPL: 1.8 G/DL
ALP SERPL-CCNC: 84 U/L (ref 39–117)
ALT SERPL W P-5'-P-CCNC: 15 U/L (ref 1–33)
ANION GAP SERPL CALCULATED.3IONS-SCNC: 10.2 MMOL/L (ref 5–15)
AST SERPL-CCNC: 15 U/L (ref 1–32)
BASOPHILS # BLD AUTO: 0.06 10*3/MM3 (ref 0–0.2)
BASOPHILS NFR BLD AUTO: 0.9 % (ref 0–1.5)
BILIRUB SERPL-MCNC: 0.3 MG/DL (ref 0–1.2)
BUN SERPL-MCNC: 20 MG/DL (ref 8–23)
BUN/CREAT SERPL: 25 (ref 7–25)
CALCIUM SPEC-SCNC: 9.4 MG/DL (ref 8.6–10.5)
CHLORIDE SERPL-SCNC: 104 MMOL/L (ref 98–107)
CHOLEST SERPL-MCNC: 151 MG/DL (ref 0–200)
CO2 SERPL-SCNC: 27.8 MMOL/L (ref 22–29)
CREAT SERPL-MCNC: 0.8 MG/DL (ref 0.57–1)
DEPRECATED RDW RBC AUTO: 40.7 FL (ref 37–54)
EOSINOPHIL # BLD AUTO: 0.52 10*3/MM3 (ref 0–0.4)
EOSINOPHIL NFR BLD AUTO: 7.8 % (ref 0.3–6.2)
ERYTHROCYTE [DISTWIDTH] IN BLOOD BY AUTOMATED COUNT: 12.6 % (ref 12.3–15.4)
GFR SERPL CREATININE-BSD FRML MDRD: 70 ML/MIN/1.73
GLOBULIN UR ELPH-MCNC: 2.3 GM/DL
GLUCOSE SERPL-MCNC: 86 MG/DL (ref 65–99)
HCT VFR BLD AUTO: 33.6 % (ref 34–46.6)
HDLC SERPL-MCNC: 42 MG/DL (ref 40–60)
HGB BLD-MCNC: 11 G/DL (ref 12–15.9)
IMM GRANULOCYTES # BLD AUTO: 0.01 10*3/MM3 (ref 0–0.05)
IMM GRANULOCYTES NFR BLD AUTO: 0.2 % (ref 0–0.5)
LDLC SERPL CALC-MCNC: 92 MG/DL (ref 0–100)
LDLC/HDLC SERPL: 2.19 {RATIO}
LYMPHOCYTES # BLD AUTO: 1.24 10*3/MM3 (ref 0.7–3.1)
LYMPHOCYTES NFR BLD AUTO: 18.6 % (ref 19.6–45.3)
MCH RBC QN AUTO: 28.5 PG (ref 26.6–33)
MCHC RBC AUTO-ENTMCNC: 32.7 G/DL (ref 31.5–35.7)
MCV RBC AUTO: 87 FL (ref 79–97)
MONOCYTES # BLD AUTO: 0.63 10*3/MM3 (ref 0.1–0.9)
MONOCYTES NFR BLD AUTO: 9.5 % (ref 5–12)
NEUTROPHILS NFR BLD AUTO: 4.19 10*3/MM3 (ref 1.7–7)
NEUTROPHILS NFR BLD AUTO: 63 % (ref 42.7–76)
NRBC BLD AUTO-RTO: 0 /100 WBC (ref 0–0.2)
PLATELET # BLD AUTO: 298 10*3/MM3 (ref 140–450)
PMV BLD AUTO: 10.2 FL (ref 6–12)
POTASSIUM SERPL-SCNC: 4 MMOL/L (ref 3.5–5.2)
PROT SERPL-MCNC: 6.4 G/DL (ref 6–8.5)
RBC # BLD AUTO: 3.86 10*6/MM3 (ref 3.77–5.28)
SODIUM SERPL-SCNC: 142 MMOL/L (ref 136–145)
TRIGL SERPL-MCNC: 85 MG/DL (ref 0–150)
VLDLC SERPL-MCNC: 17 MG/DL (ref 5–40)
WBC # BLD AUTO: 6.65 10*3/MM3 (ref 3.4–10.8)

## 2020-07-22 PROCEDURE — 99214 OFFICE O/P EST MOD 30 MIN: CPT | Performed by: FAMILY MEDICINE

## 2020-07-22 PROCEDURE — 36415 COLL VENOUS BLD VENIPUNCTURE: CPT | Performed by: FAMILY MEDICINE

## 2020-07-22 PROCEDURE — 80053 COMPREHEN METABOLIC PANEL: CPT | Performed by: FAMILY MEDICINE

## 2020-07-22 PROCEDURE — 80061 LIPID PANEL: CPT | Performed by: FAMILY MEDICINE

## 2020-07-22 PROCEDURE — 85025 COMPLETE CBC W/AUTO DIFF WBC: CPT | Performed by: FAMILY MEDICINE

## 2020-07-22 RX ORDER — METOPROLOL SUCCINATE 50 MG/1
50 TABLET, EXTENDED RELEASE ORAL DAILY
Qty: 90 TABLET | Refills: 3 | Status: SHIPPED | OUTPATIENT
Start: 2020-07-22 | End: 2021-05-10 | Stop reason: SDUPTHER

## 2020-07-22 RX ORDER — HYDROCHLOROTHIAZIDE 25 MG/1
12.5 TABLET ORAL DAILY
Qty: 45 TABLET | Refills: 3 | Status: SHIPPED | OUTPATIENT
Start: 2020-07-22 | End: 2021-09-21

## 2020-07-22 RX ORDER — AMITRIPTYLINE HYDROCHLORIDE 10 MG/1
10 TABLET, FILM COATED ORAL NIGHTLY PRN
Qty: 90 TABLET | Refills: 3 | Status: SHIPPED | OUTPATIENT
Start: 2020-07-22 | End: 2021-07-27

## 2020-07-22 RX ORDER — PRAVASTATIN SODIUM 40 MG
40 TABLET ORAL DAILY
Qty: 90 TABLET | Refills: 3 | Status: SHIPPED | OUTPATIENT
Start: 2020-07-22 | End: 2021-05-10 | Stop reason: SDUPTHER

## 2020-07-22 NOTE — PROGRESS NOTES
Please inform the patient of the following abnormal results.  Slightly anemic, will continue to monitor on next visit.

## 2020-07-22 NOTE — PROGRESS NOTES
Subjective   Gaby Jackson is a 74 y.o. female.     Chief Complaint   Patient presents with   • Hyperlipidemia         History of Present Illness     Patient's past medical history of having hyperlipidemia.  Patient currently on pravastatin 40 mg daily.  Patient denies any side effects of the medication.    Patient also has a past medical history for essential hypertension.  Blood pressure today's office is 142/66.  Patient is currently taking metoprolol succinate XL 50 mg daily as well as hydrochlorothiazide 25 mg daily.  Patient denies any side effects of the medication.    Patient also has a past medical history of having insomnia.  She states that intermittently she uses amitriptyline 10 mg nightly.  Patient states that this medicine does seem to help her sleep.    The following portions of the patient's history were reviewed and updated as appropriate: allergies, current medications, past family history, past medical history, past social history, past surgical history and problem list.    Review of Systems   Constitutional: Negative for chills and fever.   HENT: Negative for congestion, rhinorrhea, sinus pain and sore throat.    Eyes: Negative for photophobia and visual disturbance.   Respiratory: Negative for cough, chest tightness and shortness of breath.    Cardiovascular: Negative for chest pain and palpitations.   Gastrointestinal: Negative for diarrhea, nausea and vomiting.   Genitourinary: Negative for dysuria, frequency and urgency.   Skin: Negative for rash and wound.   Neurological: Negative for dizziness and syncope.   Psychiatric/Behavioral: Negative for behavioral problems and confusion.       Objective   Physical Exam   Constitutional: She is oriented to person, place, and time. She appears well-developed and well-nourished.   HENT:   Head: Normocephalic and atraumatic.   Right Ear: External ear normal.   Left Ear: External ear normal.   Eyes: EOM are normal.   Neck: Normal range of motion. Neck  supple.   Cardiovascular: Normal rate, regular rhythm and normal heart sounds.   Pulmonary/Chest: Effort normal and breath sounds normal. No respiratory distress.   Musculoskeletal: Normal range of motion.   Lymphadenopathy:     She has no cervical adenopathy.   Neurological: She is alert and oriented to person, place, and time.   Skin: Skin is warm.   Psychiatric: She has a normal mood and affect. Her behavior is normal.   Nursing note and vitals reviewed.      Vitals:    07/22/20 0825   BP: 142/66   Pulse: 57   Resp: 16   Temp: 98.1 °F (36.7 °C)   SpO2: 99%     Body mass index is 21.27 kg/m².      Assessment/Plan   Gaby was seen today for hyperlipidemia.    Diagnoses and all orders for this visit:    Hypertension, unspecified type  -     metoprolol succinate XL (TOPROL-XL) 50 MG 24 hr tablet; Take 1 tablet by mouth Daily.  -     hydroCHLOROthiazide (HYDRODIURIL) 25 MG tablet; Take 0.5 tablets by mouth Daily.  -     Comprehensive Metabolic Panel  -     CBC & Differential  -     Continue metoprolol succinate XL 50 mg daily and hydrochlorothiazide 25 mg daily.    Hyperlipidemia, unspecified hyperlipidemia type  -     pravastatin (PRAVACHOL) 40 MG tablet; Take 1 tablet by mouth Daily.  -     Comprehensive Metabolic Panel  -     Lipid Panel With LDL / HDL Ratio  -     Continue patient on pravastatin 40 mg daily.    Primary insomnia  -     amitriptyline (ELAVIL) 10 MG tablet; Take 1 tablet by mouth At Night As Needed (take 10 mg by mouth at night as needed).  -     We will continue patient on amitriptyline 10 mg nightly.    Screening for colon cancer  -     Ambulatory Referral For Screening Colonoscopy          No follow-ups on file.    Dictated utilizing Dragon Voice Recognition Software

## 2020-08-06 RX ORDER — MELOXICAM 15 MG/1
TABLET ORAL
Qty: 90 TABLET | Refills: 1 | Status: SHIPPED | OUTPATIENT
Start: 2020-08-06 | End: 2021-01-27

## 2020-11-10 ENCOUNTER — RESULTS ENCOUNTER (OUTPATIENT)
Dept: INTERNAL MEDICINE | Facility: CLINIC | Age: 75
End: 2020-11-10

## 2020-11-10 ENCOUNTER — OFFICE VISIT (OUTPATIENT)
Dept: INTERNAL MEDICINE | Facility: CLINIC | Age: 75
End: 2020-11-10

## 2020-11-10 ENCOUNTER — LAB (OUTPATIENT)
Dept: LAB | Facility: HOSPITAL | Age: 75
End: 2020-11-10

## 2020-11-10 VITALS
TEMPERATURE: 98.3 F | BODY MASS INDEX: 20.16 KG/M2 | RESPIRATION RATE: 18 BRPM | DIASTOLIC BLOOD PRESSURE: 70 MMHG | SYSTOLIC BLOOD PRESSURE: 150 MMHG | OXYGEN SATURATION: 99 % | WEIGHT: 118.1 LBS | HEART RATE: 59 BPM | HEIGHT: 64 IN

## 2020-11-10 DIAGNOSIS — E55.9 AVITAMINOSIS D: ICD-10-CM

## 2020-11-10 DIAGNOSIS — Z12.31 ENCOUNTER FOR SCREENING MAMMOGRAM FOR MALIGNANT NEOPLASM OF BREAST: ICD-10-CM

## 2020-11-10 DIAGNOSIS — Z12.11 SCREEN FOR COLON CANCER: ICD-10-CM

## 2020-11-10 DIAGNOSIS — Z00.00 HEALTHCARE MAINTENANCE: ICD-10-CM

## 2020-11-10 DIAGNOSIS — Z00.00 MEDICARE ANNUAL WELLNESS VISIT, SUBSEQUENT: Primary | ICD-10-CM

## 2020-11-10 LAB
25(OH)D3 SERPL-MCNC: 29.4 NG/ML (ref 30–100)
ALBUMIN SERPL-MCNC: 4.3 G/DL (ref 3.5–5.2)
ALBUMIN/GLOB SERPL: 1.9 G/DL
ALP SERPL-CCNC: 86 U/L (ref 39–117)
ALT SERPL W P-5'-P-CCNC: 17 U/L (ref 1–33)
ANION GAP SERPL CALCULATED.3IONS-SCNC: 5.9 MMOL/L (ref 5–15)
AST SERPL-CCNC: 22 U/L (ref 1–32)
BASOPHILS # BLD AUTO: 0.08 10*3/MM3 (ref 0–0.2)
BASOPHILS NFR BLD AUTO: 0.9 % (ref 0–1.5)
BILIRUB SERPL-MCNC: 0.3 MG/DL (ref 0–1.2)
BUN SERPL-MCNC: 17 MG/DL (ref 8–23)
BUN/CREAT SERPL: 22.1 (ref 7–25)
CALCIUM SPEC-SCNC: 9.3 MG/DL (ref 8.6–10.5)
CHLORIDE SERPL-SCNC: 103 MMOL/L (ref 98–107)
CHOLEST SERPL-MCNC: 148 MG/DL (ref 0–200)
CO2 SERPL-SCNC: 30.1 MMOL/L (ref 22–29)
CREAT SERPL-MCNC: 0.77 MG/DL (ref 0.57–1)
DEPRECATED RDW RBC AUTO: 37.4 FL (ref 37–54)
EOSINOPHIL # BLD AUTO: 0.56 10*3/MM3 (ref 0–0.4)
EOSINOPHIL NFR BLD AUTO: 6 % (ref 0.3–6.2)
ERYTHROCYTE [DISTWIDTH] IN BLOOD BY AUTOMATED COUNT: 12 % (ref 12.3–15.4)
GFR SERPL CREATININE-BSD FRML MDRD: 73 ML/MIN/1.73
GLOBULIN UR ELPH-MCNC: 2.3 GM/DL
GLUCOSE SERPL-MCNC: 79 MG/DL (ref 65–99)
HBA1C MFR BLD: 5.7 % (ref 4.8–5.6)
HCT VFR BLD AUTO: 33.6 % (ref 34–46.6)
HDLC SERPL-MCNC: 47 MG/DL (ref 40–60)
HGB BLD-MCNC: 11.3 G/DL (ref 12–15.9)
IMM GRANULOCYTES # BLD AUTO: 0.03 10*3/MM3 (ref 0–0.05)
IMM GRANULOCYTES NFR BLD AUTO: 0.3 % (ref 0–0.5)
LDLC SERPL CALC-MCNC: 86 MG/DL (ref 0–100)
LDLC/HDLC SERPL: 1.83 {RATIO}
LYMPHOCYTES # BLD AUTO: 1.25 10*3/MM3 (ref 0.7–3.1)
LYMPHOCYTES NFR BLD AUTO: 13.4 % (ref 19.6–45.3)
MCH RBC QN AUTO: 29 PG (ref 26.6–33)
MCHC RBC AUTO-ENTMCNC: 33.6 G/DL (ref 31.5–35.7)
MCV RBC AUTO: 86.4 FL (ref 79–97)
MONOCYTES # BLD AUTO: 0.75 10*3/MM3 (ref 0.1–0.9)
MONOCYTES NFR BLD AUTO: 8 % (ref 5–12)
NEUTROPHILS NFR BLD AUTO: 6.66 10*3/MM3 (ref 1.7–7)
NEUTROPHILS NFR BLD AUTO: 71.4 % (ref 42.7–76)
NRBC BLD AUTO-RTO: 0 /100 WBC (ref 0–0.2)
PLATELET # BLD AUTO: 333 10*3/MM3 (ref 140–450)
PMV BLD AUTO: 10.5 FL (ref 6–12)
POTASSIUM SERPL-SCNC: 4 MMOL/L (ref 3.5–5.2)
PROT SERPL-MCNC: 6.6 G/DL (ref 6–8.5)
RBC # BLD AUTO: 3.89 10*6/MM3 (ref 3.77–5.28)
SODIUM SERPL-SCNC: 139 MMOL/L (ref 136–145)
T-UPTAKE NFR SERPL: 1.1 TBI (ref 0.8–1.3)
T4 SERPL-MCNC: 6.6 MCG/DL (ref 4.5–11.7)
TRIGL SERPL-MCNC: 75 MG/DL (ref 0–150)
TSH SERPL DL<=0.05 MIU/L-ACNC: 2.12 UIU/ML (ref 0.27–4.2)
VLDLC SERPL-MCNC: 15 MG/DL (ref 5–40)
WBC # BLD AUTO: 9.33 10*3/MM3 (ref 3.4–10.8)

## 2020-11-10 PROCEDURE — G0439 PPPS, SUBSEQ VISIT: HCPCS | Performed by: FAMILY MEDICINE

## 2020-11-10 PROCEDURE — 83036 HEMOGLOBIN GLYCOSYLATED A1C: CPT | Performed by: FAMILY MEDICINE

## 2020-11-10 PROCEDURE — 80053 COMPREHEN METABOLIC PANEL: CPT | Performed by: FAMILY MEDICINE

## 2020-11-10 PROCEDURE — 82306 VITAMIN D 25 HYDROXY: CPT | Performed by: FAMILY MEDICINE

## 2020-11-10 PROCEDURE — 36415 COLL VENOUS BLD VENIPUNCTURE: CPT | Performed by: FAMILY MEDICINE

## 2020-11-10 PROCEDURE — 84479 ASSAY OF THYROID (T3 OR T4): CPT | Performed by: FAMILY MEDICINE

## 2020-11-10 PROCEDURE — 80061 LIPID PANEL: CPT | Performed by: FAMILY MEDICINE

## 2020-11-10 PROCEDURE — 84443 ASSAY THYROID STIM HORMONE: CPT | Performed by: FAMILY MEDICINE

## 2020-11-10 PROCEDURE — 85025 COMPLETE CBC W/AUTO DIFF WBC: CPT | Performed by: FAMILY MEDICINE

## 2020-11-10 PROCEDURE — 84436 ASSAY OF TOTAL THYROXINE: CPT | Performed by: FAMILY MEDICINE

## 2020-11-10 NOTE — PROGRESS NOTES
The ABCs of the Annual Wellness Visit  Subsequent Medicare Wellness Visit    Chief Complaint   Patient presents with   • Medicare Wellness-subsequent       Subjective   History of Present Illness:  Gaby Jackson is a 75 y.o. female who presents for a Subsequent Medicare Wellness Visit.    HEALTH RISK ASSESSMENT    Recent Hospitalizations:  No hospitalization(s) within the last year.    Current Medical Providers:  Patient Care Team:  Maicol Florentino MD as PCP - General (Family Medicine)    Smoking Status:  Social History     Tobacco Use   Smoking Status Never Smoker   Smokeless Tobacco Never Used       Alcohol Consumption:  Social History     Substance and Sexual Activity   Alcohol Use No       Depression Screen:   PHQ-2/PHQ-9 Depression Screening 11/10/2020   Little interest or pleasure in doing things 0   Feeling down, depressed, or hopeless 0   Trouble falling or staying asleep, or sleeping too much -   Feeling tired or having little energy -   Poor appetite or overeating -   Feeling bad about yourself - or that you are a failure or have let yourself or your family down -   Trouble concentrating on things, such as reading the newspaper or watching television -   Moving or speaking so slowly that other people could have noticed. Or the opposite - being so fidgety or restless that you have been moving around a lot more than usual -   Thoughts that you would be better off dead, or of hurting yourself in some way -   Total Score 0   If you checked off any problems, how difficult have these problems made it for you to do your work, take care of things at home, or get along with other people? -       Fall Risk Screen:  STEADI Fall Risk Assessment has not been completed.    Health Habits and Functional and Cognitive Screening:  Functional & Cognitive Status 11/10/2020   Do you have difficulty preparing food and eating? No   Do you have difficulty bathing yourself, getting dressed or grooming yourself? No   Do you have  difficulty using the toilet? No   Do you have difficulty moving around from place to place? No   Do you have trouble with steps or getting out of a bed or a chair? No   Current Diet Well Balanced Diet   Dental Exam Up to date   Eye Exam Up to date   Exercise (times per week) 5 times per week   Current Exercises Include Walking   Current Exercise Activities Include -   Do you need help using the phone?  No   Are you deaf or do you have serious difficulty hearing?  No   Do you need help with transportation? No   Do you need help shopping? No   Do you need help preparing meals?  No   Do you need help with housework?  No   Do you need help with laundry? No   Do you need help taking your medications? No   Do you need help managing money? No   Do you ever drive or ride in a car without wearing a seat belt? No   Have you felt unusual stress, anger or loneliness in the last month? No   Who do you live with? Alone   If you need help, do you have trouble finding someone available to you? No   Have you been bothered in the last four weeks by sexual problems? No   Do you have difficulty concentrating, remembering or making decisions? No         Does the patient have evidence of cognitive impairment? No    Asprin use counseling:Taking ASA appropriately as indicated    Age-appropriate Screening Schedule:  Refer to the list below for future screening recommendations based on patient's age, sex and/or medical conditions. Orders for these recommended tests are listed in the plan section. The patient has been provided with a written plan.    Health Maintenance   Topic Date Due   • COLONOSCOPY  1945   • TDAP/TD VACCINES (1 - Tdap) 09/03/1965   • ZOSTER VACCINE (1 of 2) 09/03/1996   • MAMMOGRAM  03/08/2018   • LIPID PANEL  07/22/2021   • INFLUENZA VACCINE  Completed          The following portions of the patient's history were reviewed and updated as appropriate: allergies, current medications, past family history, past medical  history, past social history, past surgical history and problem list.    Outpatient Medications Prior to Visit   Medication Sig Dispense Refill   • acetaminophen (TYLENOL) 325 MG tablet Take 650 mg by mouth Daily As Needed for Mild Pain .     • amitriptyline (ELAVIL) 10 MG tablet Take 1 tablet by mouth At Night As Needed (take 10 mg by mouth at night as needed). 90 tablet 3   • BIOTIN PO Take 2 tablets by mouth Daily.     • coenzyme Q10 100 MG capsule Take 200 mg by mouth Daily.     • diphenhydrAMINE-acetaminophen (TYLENOL PM)  MG tablet per tablet Take 1 tablet by mouth At Night As Needed for Sleep.     • glucosamine sulfate 500 MG capsule capsule Take 1,500 mg by mouth Daily.     • hydroCHLOROthiazide (HYDRODIURIL) 25 MG tablet Take 0.5 tablets by mouth Daily. 45 tablet 3   • meloxicam (MOBIC) 15 MG tablet TAKE 1 TABLET BY MOUTH EVERY DAY 90 tablet 1   • metoprolol succinate XL (TOPROL-XL) 50 MG 24 hr tablet Take 1 tablet by mouth Daily. 90 tablet 3   • minocycline (MINOCIN,DYNACIN) 50 MG capsule Take 50 mg by mouth Daily.  0   • Omega-3 Fatty Acids (FISH OIL) 1000 MG capsule capsule Take 1,000 mg by mouth Daily With Breakfast.     • ondansetron ODT (ZOFRAN-ODT) 4 MG disintegrating tablet Take 1 tablet by mouth Every 6 (Six) Hours As Needed for Nausea or Vomiting. 12 tablet 0   • pravastatin (PRAVACHOL) 40 MG tablet Take 1 tablet by mouth Daily. 90 tablet 3   • triamcinolone (KENALOG) 0.1 % cream APPLY TO AFFECTED AREA OF EXTREMITIES TWICE A DAY AS NEEDED FOR ITCH  0     No facility-administered medications prior to visit.        Patient Active Problem List   Diagnosis   • Hypertension   • Hyperlipidemia   • Avitaminosis D   • Diplopia   • Labyrinthitis of left ear   • Fatigue   • Cephalalgia   • Blood glucose elevated   • Primary osteoarthritis of both knees       Advanced Care Planning:  ACP discussion was held with the patient during this visit. Patient does not have an advance directive, declines further  "assistance.    Review of Systems   Constitutional: Negative for chills and fever.   HENT: Negative for congestion, rhinorrhea, sinus pain and sore throat.    Eyes: Negative for photophobia and visual disturbance.   Respiratory: Negative for cough, chest tightness and shortness of breath.    Cardiovascular: Negative for chest pain and palpitations.   Gastrointestinal: Negative for diarrhea, nausea and vomiting.   Genitourinary: Negative for dysuria, frequency and urgency.   Skin: Negative for rash and wound.   Neurological: Negative for dizziness and syncope.   Psychiatric/Behavioral: Negative for behavioral problems and confusion.       Compared to one year ago, the patient feels her physical health is the same.  Compared to one year ago, the patient feels her mental health is the same.    Reviewed chart for potential of high risk medication in the elderly: yes  Reviewed chart for potential of harmful drug interactions in the elderly:yes    Objective         Vitals:    11/10/20 1002   BP: 150/70   Pulse: 59   Resp: 18   Temp: 98.3 °F (36.8 °C)   TempSrc: Infrared   SpO2: 99%   Weight: 53.6 kg (118 lb 1.6 oz)   Height: 161.3 cm (63.5\")       Body mass index is 20.59 kg/m².  Discussed the patient's BMI with her. The BMI is in the acceptable range.    Physical Exam  Vitals signs and nursing note reviewed.   Constitutional:       Appearance: She is well-developed.   HENT:      Head: Normocephalic and atraumatic.   Neck:      Musculoskeletal: Normal range of motion and neck supple.   Neurological:      Mental Status: She is alert and oriented to person, place, and time.   Psychiatric:         Behavior: Behavior normal.               Assessment/Plan   Medicare Risks and Personalized Health Plan  CMS Preventative Services Quick Reference  Advance Directive Discussion  Breast Cancer/Mammogram Screening  Cardiovascular risk  Colon Cancer Screening    The above risks/problems have been discussed with the patient.  Pertinent " information has been shared with the patient in the After Visit Summary.  Follow up plans and orders are seen below in the Assessment/Plan Section.    Diagnoses and all orders for this visit:    1. Medicare annual wellness visit, subsequent (Primary)    2. Healthcare maintenance  -     CBC & Differential  -     Comprehensive Metabolic Panel  -     Hemoglobin A1c  -     Thyroid Panel With TSH  -     Lipid Panel With LDL / HDL Ratio  -     Vitamin D 25 Hydroxy    3. Avitaminosis D  -     Vitamin D 25 Hydroxy    4. Screen for colon cancer  -     Cologuard - Stool, Per Rectum; Future    5. Encounter for screening mammogram for malignant neoplasm of breast  -     Mammo Screening Bilateral With CAD      Follow Up:  No follow-ups on file.     An After Visit Summary and PPPS were given to the patient.

## 2020-11-14 NOTE — PROGRESS NOTES
Please inform the patient of the following abnormal results.  Low vitamin d, needs to do 2000 IU daily.

## 2020-12-07 ENCOUNTER — TELEPHONE (OUTPATIENT)
Dept: INTERNAL MEDICINE | Facility: CLINIC | Age: 75
End: 2020-12-07

## 2020-12-07 NOTE — TELEPHONE ENCOUNTER
HUB please inform pt her cologuard test was negative.     ----- Message from Maicol Florentino MD sent at 12/5/2020 11:14 AM EST -----    Negative cologuard.

## 2021-01-27 RX ORDER — MELOXICAM 15 MG/1
TABLET ORAL
Qty: 90 TABLET | Refills: 1 | Status: SHIPPED | OUTPATIENT
Start: 2021-01-27 | End: 2021-07-29

## 2021-05-10 ENCOUNTER — OFFICE VISIT (OUTPATIENT)
Dept: INTERNAL MEDICINE | Facility: CLINIC | Age: 76
End: 2021-05-10

## 2021-05-10 ENCOUNTER — LAB (OUTPATIENT)
Dept: LAB | Facility: HOSPITAL | Age: 76
End: 2021-05-10

## 2021-05-10 VITALS
SYSTOLIC BLOOD PRESSURE: 136 MMHG | BODY MASS INDEX: 20.95 KG/M2 | DIASTOLIC BLOOD PRESSURE: 70 MMHG | HEIGHT: 64 IN | HEART RATE: 56 BPM | OXYGEN SATURATION: 98 % | WEIGHT: 122.7 LBS

## 2021-05-10 DIAGNOSIS — E78.5 HYPERLIPIDEMIA, UNSPECIFIED HYPERLIPIDEMIA TYPE: ICD-10-CM

## 2021-05-10 DIAGNOSIS — I10 HYPERTENSION, UNSPECIFIED TYPE: Primary | ICD-10-CM

## 2021-05-10 DIAGNOSIS — E55.9 VITAMIN D DEFICIENCY: ICD-10-CM

## 2021-05-10 LAB
25(OH)D3 SERPL-MCNC: 37.7 NG/ML (ref 30–100)
ALBUMIN SERPL-MCNC: 4.1 G/DL (ref 3.5–5.2)
ALBUMIN/GLOB SERPL: 2.3 G/DL
ALP SERPL-CCNC: 88 U/L (ref 39–117)
ALT SERPL W P-5'-P-CCNC: 17 U/L (ref 1–33)
ANION GAP SERPL CALCULATED.3IONS-SCNC: 9.6 MMOL/L (ref 5–15)
AST SERPL-CCNC: 20 U/L (ref 1–32)
BASOPHILS # BLD AUTO: 0.08 10*3/MM3 (ref 0–0.2)
BASOPHILS NFR BLD AUTO: 1.1 % (ref 0–1.5)
BILIRUB SERPL-MCNC: 0.2 MG/DL (ref 0–1.2)
BUN SERPL-MCNC: 15 MG/DL (ref 8–23)
BUN/CREAT SERPL: 19.5 (ref 7–25)
CALCIUM SPEC-SCNC: 9.3 MG/DL (ref 8.6–10.5)
CHLORIDE SERPL-SCNC: 104 MMOL/L (ref 98–107)
CHOLEST SERPL-MCNC: 164 MG/DL (ref 0–200)
CO2 SERPL-SCNC: 28.4 MMOL/L (ref 22–29)
CREAT SERPL-MCNC: 0.77 MG/DL (ref 0.57–1)
DEPRECATED RDW RBC AUTO: 38.8 FL (ref 37–54)
EOSINOPHIL # BLD AUTO: 0.55 10*3/MM3 (ref 0–0.4)
EOSINOPHIL NFR BLD AUTO: 7.2 % (ref 0.3–6.2)
ERYTHROCYTE [DISTWIDTH] IN BLOOD BY AUTOMATED COUNT: 12 % (ref 12.3–15.4)
GFR SERPL CREATININE-BSD FRML MDRD: 73 ML/MIN/1.73
GLOBULIN UR ELPH-MCNC: 1.8 GM/DL
GLUCOSE SERPL-MCNC: 93 MG/DL (ref 65–99)
HCT VFR BLD AUTO: 34.8 % (ref 34–46.6)
HDLC SERPL-MCNC: 52 MG/DL (ref 40–60)
HGB BLD-MCNC: 11.2 G/DL (ref 12–15.9)
IMM GRANULOCYTES # BLD AUTO: 0.01 10*3/MM3 (ref 0–0.05)
IMM GRANULOCYTES NFR BLD AUTO: 0.1 % (ref 0–0.5)
LDLC SERPL CALC-MCNC: 96 MG/DL (ref 0–100)
LDLC/HDLC SERPL: 1.83 {RATIO}
LYMPHOCYTES # BLD AUTO: 1.17 10*3/MM3 (ref 0.7–3.1)
LYMPHOCYTES NFR BLD AUTO: 15.4 % (ref 19.6–45.3)
MCH RBC QN AUTO: 28.4 PG (ref 26.6–33)
MCHC RBC AUTO-ENTMCNC: 32.2 G/DL (ref 31.5–35.7)
MCV RBC AUTO: 88.1 FL (ref 79–97)
MONOCYTES # BLD AUTO: 0.72 10*3/MM3 (ref 0.1–0.9)
MONOCYTES NFR BLD AUTO: 9.5 % (ref 5–12)
NEUTROPHILS NFR BLD AUTO: 5.08 10*3/MM3 (ref 1.7–7)
NEUTROPHILS NFR BLD AUTO: 66.7 % (ref 42.7–76)
NRBC BLD AUTO-RTO: 0 /100 WBC (ref 0–0.2)
PLATELET # BLD AUTO: 364 10*3/MM3 (ref 140–450)
PMV BLD AUTO: 10.4 FL (ref 6–12)
POTASSIUM SERPL-SCNC: 4.1 MMOL/L (ref 3.5–5.2)
PROT SERPL-MCNC: 5.9 G/DL (ref 6–8.5)
RBC # BLD AUTO: 3.95 10*6/MM3 (ref 3.77–5.28)
SODIUM SERPL-SCNC: 142 MMOL/L (ref 136–145)
TRIGL SERPL-MCNC: 83 MG/DL (ref 0–150)
VLDLC SERPL-MCNC: 16 MG/DL (ref 5–40)
WBC # BLD AUTO: 7.61 10*3/MM3 (ref 3.4–10.8)

## 2021-05-10 PROCEDURE — 80061 LIPID PANEL: CPT | Performed by: FAMILY MEDICINE

## 2021-05-10 PROCEDURE — 36415 COLL VENOUS BLD VENIPUNCTURE: CPT | Performed by: FAMILY MEDICINE

## 2021-05-10 PROCEDURE — 82306 VITAMIN D 25 HYDROXY: CPT | Performed by: FAMILY MEDICINE

## 2021-05-10 PROCEDURE — 99214 OFFICE O/P EST MOD 30 MIN: CPT | Performed by: FAMILY MEDICINE

## 2021-05-10 PROCEDURE — 85025 COMPLETE CBC W/AUTO DIFF WBC: CPT | Performed by: FAMILY MEDICINE

## 2021-05-10 PROCEDURE — 80053 COMPREHEN METABOLIC PANEL: CPT | Performed by: FAMILY MEDICINE

## 2021-05-10 RX ORDER — PRAVASTATIN SODIUM 40 MG
40 TABLET ORAL DAILY
Qty: 90 TABLET | Refills: 3 | Status: SHIPPED | OUTPATIENT
Start: 2021-05-10 | End: 2021-11-12 | Stop reason: SDUPTHER

## 2021-05-10 RX ORDER — METOPROLOL SUCCINATE 50 MG/1
50 TABLET, EXTENDED RELEASE ORAL DAILY
Qty: 90 TABLET | Refills: 3 | Status: SHIPPED | OUTPATIENT
Start: 2021-05-10 | End: 2021-11-12 | Stop reason: SDUPTHER

## 2021-05-10 NOTE — PROGRESS NOTES
"Chief Complaint  follow up to hypertension and follow up to hyperlipidemia    Subjective          Gaby Jackson presents to [facility]  History of Present Illness    Patient's past medical history having essential hypertension.  Patient is currently on metoprolol succinate XL 50 mg daily.  Patient denies any side effects of the medication.  Blood pressure is 136/70.    Patient also has hyperlipidemia.  Patient current taking pravastatin 40 mg daily.  Patient denies any side effects of the medicine.    Patient also has vitamin D deficiency.  She is currently on 2000 IUs of vitamin D daily.  She does not have any side effects of the medicine.    Objective   Vital Signs:   /70 (BP Location: Left arm, Patient Position: Sitting, Cuff Size: Adult)   Pulse 56   Ht 161.3 cm (63.5\")   Wt 55.7 kg (122 lb 11.2 oz)   SpO2 98%   BMI 21.39 kg/m²     Physical Exam  Vitals and nursing note reviewed.   Constitutional:       Appearance: She is well-developed.   HENT:      Head: Normocephalic and atraumatic.   Musculoskeletal:      Cervical back: Normal range of motion and neck supple.   Neurological:      Mental Status: She is alert and oriented to person, place, and time.   Psychiatric:         Behavior: Behavior normal.        Result Review :                 Assessment and Plan    Diagnoses and all orders for this visit:    1. Hypertension, unspecified type (Primary)  -     Comprehensive Metabolic Panel  -     CBC & Differential  -     metoprolol succinate XL (TOPROL-XL) 50 MG 24 hr tablet; Take 1 tablet by mouth Daily.  Dispense: 90 tablet; Refill: 3    2. Hyperlipidemia, unspecified hyperlipidemia type  -     Comprehensive Metabolic Panel  -     Lipid Panel With LDL / HDL Ratio  -     pravastatin (PRAVACHOL) 40 MG tablet; Take 1 tablet by mouth Daily.  Dispense: 90 tablet; Refill: 3    3. Vitamin D deficiency  -     Vitamin D 25 Hydroxy      Should continue taking vitamin D 2000 IUs " daily.  Will check vitamin D levels today's office visit.  As for her hyperlipidemia, continue pravastatin 40 mg daily.  We will check a lipid panel.  For the hypertension, currently stable, continue metoprolol succinate XL 50 mg daily.      Follow Up   No follow-ups on file.  Patient was given instructions and counseling regarding her condition or for health maintenance advice. Please see specific information pulled into the AVS if appropriate.

## 2021-07-27 DIAGNOSIS — F51.01 PRIMARY INSOMNIA: ICD-10-CM

## 2021-07-27 RX ORDER — AMITRIPTYLINE HYDROCHLORIDE 10 MG/1
10 TABLET, FILM COATED ORAL NIGHTLY PRN
Qty: 90 TABLET | Refills: 2 | Status: SHIPPED | OUTPATIENT
Start: 2021-07-27 | End: 2021-11-12 | Stop reason: SDUPTHER

## 2021-07-29 RX ORDER — MELOXICAM 15 MG/1
TABLET ORAL
Qty: 90 TABLET | Refills: 1 | Status: SHIPPED | OUTPATIENT
Start: 2021-07-29 | End: 2022-01-21

## 2021-09-21 DIAGNOSIS — I10 HYPERTENSION, UNSPECIFIED TYPE: ICD-10-CM

## 2021-09-21 RX ORDER — HYDROCHLOROTHIAZIDE 25 MG/1
TABLET ORAL
Qty: 45 TABLET | Refills: 1 | Status: SHIPPED | OUTPATIENT
Start: 2021-09-21 | End: 2021-11-12 | Stop reason: SDUPTHER

## 2021-11-12 ENCOUNTER — OFFICE VISIT (OUTPATIENT)
Dept: INTERNAL MEDICINE | Facility: CLINIC | Age: 76
End: 2021-11-12

## 2021-11-12 ENCOUNTER — LAB (OUTPATIENT)
Dept: LAB | Facility: HOSPITAL | Age: 76
End: 2021-11-12

## 2021-11-12 VITALS
OXYGEN SATURATION: 99 % | DIASTOLIC BLOOD PRESSURE: 69 MMHG | HEART RATE: 60 BPM | HEIGHT: 64 IN | WEIGHT: 121.9 LBS | SYSTOLIC BLOOD PRESSURE: 119 MMHG | BODY MASS INDEX: 20.81 KG/M2 | TEMPERATURE: 98 F | RESPIRATION RATE: 16 BRPM

## 2021-11-12 DIAGNOSIS — Z00.00 HEALTHCARE MAINTENANCE: ICD-10-CM

## 2021-11-12 DIAGNOSIS — E78.5 HYPERLIPIDEMIA, UNSPECIFIED HYPERLIPIDEMIA TYPE: ICD-10-CM

## 2021-11-12 DIAGNOSIS — F51.01 PRIMARY INSOMNIA: ICD-10-CM

## 2021-11-12 DIAGNOSIS — Z00.00 MEDICARE ANNUAL WELLNESS VISIT, SUBSEQUENT: Primary | ICD-10-CM

## 2021-11-12 DIAGNOSIS — I10 HYPERTENSION, UNSPECIFIED TYPE: ICD-10-CM

## 2021-11-12 DIAGNOSIS — E55.9 VITAMIN D DEFICIENCY: ICD-10-CM

## 2021-11-12 LAB
25(OH)D3 SERPL-MCNC: 42.7 NG/ML (ref 30–100)
ALBUMIN SERPL-MCNC: 4.3 G/DL (ref 3.5–5.2)
ALBUMIN/GLOB SERPL: 1.6 G/DL
ALP SERPL-CCNC: 97 U/L (ref 39–117)
ALT SERPL W P-5'-P-CCNC: 15 U/L (ref 1–33)
ANION GAP SERPL CALCULATED.3IONS-SCNC: 10.5 MMOL/L (ref 5–15)
AST SERPL-CCNC: 19 U/L (ref 1–32)
BASOPHILS # BLD AUTO: 0.1 10*3/MM3 (ref 0–0.2)
BASOPHILS NFR BLD AUTO: 1 % (ref 0–1.5)
BILIRUB SERPL-MCNC: 0.2 MG/DL (ref 0–1.2)
BUN SERPL-MCNC: 20 MG/DL (ref 8–23)
BUN/CREAT SERPL: 30.8 (ref 7–25)
CALCIUM SPEC-SCNC: 9.5 MG/DL (ref 8.6–10.5)
CHLORIDE SERPL-SCNC: 102 MMOL/L (ref 98–107)
CHOLEST SERPL-MCNC: 149 MG/DL (ref 0–200)
CO2 SERPL-SCNC: 26.5 MMOL/L (ref 22–29)
CREAT SERPL-MCNC: 0.65 MG/DL (ref 0.57–1)
DEPRECATED RDW RBC AUTO: 38.5 FL (ref 37–54)
EOSINOPHIL # BLD AUTO: 0.8 10*3/MM3 (ref 0–0.4)
EOSINOPHIL NFR BLD AUTO: 7.9 % (ref 0.3–6.2)
ERYTHROCYTE [DISTWIDTH] IN BLOOD BY AUTOMATED COUNT: 12.4 % (ref 12.3–15.4)
GFR SERPL CREATININE-BSD FRML MDRD: 89 ML/MIN/1.73
GLOBULIN UR ELPH-MCNC: 2.7 GM/DL
GLUCOSE SERPL-MCNC: 86 MG/DL (ref 65–99)
HBA1C MFR BLD: 5.77 % (ref 4.8–5.6)
HCT VFR BLD AUTO: 32.1 % (ref 34–46.6)
HDLC SERPL-MCNC: 48 MG/DL (ref 40–60)
HGB BLD-MCNC: 10.1 G/DL (ref 12–15.9)
IMM GRANULOCYTES # BLD AUTO: 0.02 10*3/MM3 (ref 0–0.05)
IMM GRANULOCYTES NFR BLD AUTO: 0.2 % (ref 0–0.5)
LDLC SERPL CALC-MCNC: 83 MG/DL (ref 0–100)
LDLC/HDLC SERPL: 1.69 {RATIO}
LYMPHOCYTES # BLD AUTO: 1.45 10*3/MM3 (ref 0.7–3.1)
LYMPHOCYTES NFR BLD AUTO: 14.4 % (ref 19.6–45.3)
MCH RBC QN AUTO: 27.2 PG (ref 26.6–33)
MCHC RBC AUTO-ENTMCNC: 31.5 G/DL (ref 31.5–35.7)
MCV RBC AUTO: 86.3 FL (ref 79–97)
MONOCYTES # BLD AUTO: 0.81 10*3/MM3 (ref 0.1–0.9)
MONOCYTES NFR BLD AUTO: 8 % (ref 5–12)
NEUTROPHILS NFR BLD AUTO: 6.89 10*3/MM3 (ref 1.7–7)
NEUTROPHILS NFR BLD AUTO: 68.5 % (ref 42.7–76)
NRBC BLD AUTO-RTO: 0 /100 WBC (ref 0–0.2)
PLATELET # BLD AUTO: 457 10*3/MM3 (ref 140–450)
PMV BLD AUTO: 10 FL (ref 6–12)
POTASSIUM SERPL-SCNC: 4.4 MMOL/L (ref 3.5–5.2)
PROT SERPL-MCNC: 7 G/DL (ref 6–8.5)
RBC # BLD AUTO: 3.72 10*6/MM3 (ref 3.77–5.28)
SODIUM SERPL-SCNC: 139 MMOL/L (ref 136–145)
T-UPTAKE NFR SERPL: 1.08 TBI (ref 0.8–1.3)
T4 SERPL-MCNC: 7.31 MCG/DL (ref 4.5–11.7)
TRIGL SERPL-MCNC: 99 MG/DL (ref 0–150)
TSH SERPL DL<=0.05 MIU/L-ACNC: 2.16 UIU/ML (ref 0.27–4.2)
VLDLC SERPL-MCNC: 18 MG/DL (ref 5–40)
WBC # BLD AUTO: 10.07 10*3/MM3 (ref 3.4–10.8)

## 2021-11-12 PROCEDURE — G0439 PPPS, SUBSEQ VISIT: HCPCS | Performed by: FAMILY MEDICINE

## 2021-11-12 PROCEDURE — 84479 ASSAY OF THYROID (T3 OR T4): CPT | Performed by: FAMILY MEDICINE

## 2021-11-12 PROCEDURE — 36415 COLL VENOUS BLD VENIPUNCTURE: CPT | Performed by: FAMILY MEDICINE

## 2021-11-12 PROCEDURE — 99214 OFFICE O/P EST MOD 30 MIN: CPT | Performed by: FAMILY MEDICINE

## 2021-11-12 PROCEDURE — 83036 HEMOGLOBIN GLYCOSYLATED A1C: CPT | Performed by: FAMILY MEDICINE

## 2021-11-12 PROCEDURE — 80053 COMPREHEN METABOLIC PANEL: CPT | Performed by: FAMILY MEDICINE

## 2021-11-12 PROCEDURE — 80061 LIPID PANEL: CPT | Performed by: FAMILY MEDICINE

## 2021-11-12 PROCEDURE — 84436 ASSAY OF TOTAL THYROXINE: CPT | Performed by: FAMILY MEDICINE

## 2021-11-12 PROCEDURE — 84443 ASSAY THYROID STIM HORMONE: CPT | Performed by: FAMILY MEDICINE

## 2021-11-12 PROCEDURE — 1170F FXNL STATUS ASSESSED: CPT | Performed by: FAMILY MEDICINE

## 2021-11-12 PROCEDURE — 85025 COMPLETE CBC W/AUTO DIFF WBC: CPT | Performed by: FAMILY MEDICINE

## 2021-11-12 PROCEDURE — 1126F AMNT PAIN NOTED NONE PRSNT: CPT | Performed by: FAMILY MEDICINE

## 2021-11-12 PROCEDURE — 1160F RVW MEDS BY RX/DR IN RCRD: CPT | Performed by: FAMILY MEDICINE

## 2021-11-12 PROCEDURE — 82306 VITAMIN D 25 HYDROXY: CPT | Performed by: FAMILY MEDICINE

## 2021-11-12 RX ORDER — HYDROCHLOROTHIAZIDE 25 MG/1
12.5 TABLET ORAL DAILY
Qty: 45 TABLET | Refills: 1 | Status: SHIPPED | OUTPATIENT
Start: 2021-11-12 | End: 2022-06-15

## 2021-11-12 RX ORDER — METOPROLOL SUCCINATE 50 MG/1
50 TABLET, EXTENDED RELEASE ORAL DAILY
Qty: 90 TABLET | Refills: 3 | Status: SHIPPED | OUTPATIENT
Start: 2021-11-12 | End: 2023-01-22

## 2021-11-12 RX ORDER — AMITRIPTYLINE HYDROCHLORIDE 10 MG/1
10 TABLET, FILM COATED ORAL NIGHTLY PRN
Qty: 90 TABLET | Refills: 2 | Status: SHIPPED | OUTPATIENT
Start: 2021-11-12

## 2021-11-12 RX ORDER — PRAVASTATIN SODIUM 40 MG
40 TABLET ORAL DAILY
Qty: 90 TABLET | Refills: 3 | Status: SHIPPED | OUTPATIENT
Start: 2021-11-12 | End: 2022-05-11

## 2021-11-12 NOTE — PROGRESS NOTES
"Chief Complaint  Medicare Wellness-subsequent    Subjective          Gaby Jackson presents to [facility]  History of Present Illness    Patient with past medical history having essential hypertension. Her blood pressure today is 119/69. She is taking metoprolol succinate XL 50 mg daily. She is also on hydrochlorothiazide 12.5 mg daily. She denies any side effects of the medicine.    Patient also has a past medical history of having hyperlipidemia. She is currently taking pravastatin 40 mg daily. She denies any side effects of the medicine. She states he stopped taking the fish oil, as it did upset her stomach.    Patient also has a past medical history of having insomnia. She currently takes amitriptyline 10 mg as needed nightly. She does not have any side effects of the medicine.    Patient has vitamin D deficiency. Patient is currently taking 2000 IUs of vitamin D daily. She denies any side effects of the medicine.    Objective   Vital Signs:   /69   Pulse 60   Temp 98 °F (36.7 °C)   Resp 16   Ht 161.3 cm (63.5\")   Wt 55.3 kg (121 lb 14.4 oz)   SpO2 99%   BMI 21.25 kg/m²     Physical Exam  Vitals and nursing note reviewed.   Constitutional:       Appearance: She is well-developed.   HENT:      Head: Normocephalic and atraumatic.   Musculoskeletal:      Cervical back: Normal range of motion and neck supple.   Neurological:      Mental Status: She is alert and oriented to person, place, and time.   Psychiatric:         Behavior: Behavior normal.        Result Review :                 Assessment and Plan    Diagnoses and all orders for this visit:        Hypertension, unspecified type  -     CBC & Differential  -     Comprehensive Metabolic Panel  -     metoprolol succinate XL (TOPROL-XL) 50 MG 24 hr tablet; Take 1 tablet by mouth Daily.  Dispense: 90 tablet; Refill: 3  -     hydroCHLOROthiazide (HYDRODIURIL) 25 MG tablet; Take 0.5 tablets by mouth Daily.  Dispense: 45 " tablet; Refill: 1  -     Continue HCTZ 12.5 mg daily and metoprolol succinate XL 10 mg daily.    Vitamin D deficiency  -     Vitamin D 25 Hydroxy  -     Continue 2000 IUs of vitamin D.    Hyperlipidemia, unspecified hyperlipidemia type  -     Lipid Panel With LDL / HDL Ratio  -     pravastatin (PRAVACHOL) 40 MG tablet; Take 1 tablet by mouth Daily.  Dispense: 90 tablet; Refill: 3  -     We will continue pravastatin 40 mg daily.    Primary insomnia  -     amitriptyline (ELAVIL) 10 MG tablet; Take 1 tablet by mouth At Night As Needed (take 10 mg by mouth at night as needed).  Dispense: 90 tablet; Refill: 2  -     Use amitriptyline as needed.        Follow Up   No follow-ups on file.  Patient was given instructions and counseling regarding her condition or for health maintenance advice. Please see specific information pulled into the AVS if appropriate.        rc

## 2021-11-12 NOTE — PROGRESS NOTES
The ABCs of the Annual Wellness Visit  Subsequent Medicare Wellness Visit    Chief Complaint   Patient presents with   • Medicare Wellness-subsequent      Subjective    History of Present Illness:  Gaby Jackson is a 76 y.o. female who presents for a Subsequent Medicare Wellness Visit.    The following portions of the patient's history were reviewed and   updated as appropriate: allergies, current medications, past family history, past medical history, past social history, past surgical history and problem list.    Compared to one year ago, the patient feels her physical   health is worse.    Compared to one year ago, the patient feels her mental   health is the same.    Recent Hospitalizations:  She was not admitted to the hospital during the last year.       Current Medical Providers:  Patient Care Team:  Maicol Florentino MD as PCP - General (Family Medicine)    Outpatient Medications Prior to Visit   Medication Sig Dispense Refill   • acetaminophen (TYLENOL) 325 MG tablet Take 650 mg by mouth Daily As Needed for Mild Pain .     • BIOTIN PO Take 2 tablets by mouth Daily.     • coenzyme Q10 100 MG capsule Take 200 mg by mouth Daily.     • diphenhydrAMINE-acetaminophen (TYLENOL PM)  MG tablet per tablet Take 1 tablet by mouth At Night As Needed for Sleep.     • glucosamine sulfate 500 MG capsule capsule Take 1,500 mg by mouth Daily.     • meloxicam (MOBIC) 15 MG tablet TAKE 1 TABLET BY MOUTH EVERY DAY 90 tablet 1   • minocycline (MINOCIN,DYNACIN) 50 MG capsule Take 50 mg by mouth Daily.  0   • Omega-3 Fatty Acids (FISH OIL) 1000 MG capsule capsule Take 1,000 mg by mouth Daily With Breakfast.     • ondansetron ODT (ZOFRAN-ODT) 4 MG disintegrating tablet Take 1 tablet by mouth Every 6 (Six) Hours As Needed for Nausea or Vomiting. 12 tablet 0   • triamcinolone (KENALOG) 0.1 % cream APPLY TO AFFECTED AREA OF EXTREMITIES TWICE A DAY AS NEEDED FOR ITCH  0   • amitriptyline (ELAVIL) 10 MG tablet TAKE 1 TABLET BY  "MOUTH AT NIGHT AS NEEDED (TAKE 10 MG BY MOUTH AT NIGHT AS NEEDED). 90 tablet 2   • hydroCHLOROthiazide (HYDRODIURIL) 25 MG tablet TAKE 1/2 TABLET BY MOUTH EVERY DAY 45 tablet 1   • metoprolol succinate XL (TOPROL-XL) 50 MG 24 hr tablet Take 1 tablet by mouth Daily. 90 tablet 3   • pravastatin (PRAVACHOL) 40 MG tablet Take 1 tablet by mouth Daily. 90 tablet 3     No facility-administered medications prior to visit.       No opioid medication identified on active medication list. I have reviewed chart for other potential  high risk medication/s and harmful drug interactions in the elderly.          Aspirin is not on active medication list.  Aspirin use is not indicated based on review of current medical condition/s. Risk of harm outweighs potential benefits.  .    Patient Active Problem List   Diagnosis   • Hypertension   • Hyperlipidemia   • Avitaminosis D   • Diplopia   • Labyrinthitis of left ear   • Fatigue   • Cephalalgia   • Blood glucose elevated   • Primary osteoarthritis of both knees     Advance Care Planning  Advance Directive is not on file.  ACP discussion was held with the patient during this visit. Patient does not have an advance directive, information provided.          Objective    Vitals:    11/12/21 0938   BP: 119/69   Pulse: 60   Resp: 16   Temp: 98 °F (36.7 °C)   SpO2: 99%   Weight: 55.3 kg (121 lb 14.4 oz)   Height: 161.3 cm (63.5\")   PainSc: 0-No pain     BMI Readings from Last 1 Encounters:   11/12/21 21.25 kg/m²   BMI is within normal parameters. No follow-up required.    Does the patient have evidence of cognitive impairment? No    Physical Exam            HEALTH RISK ASSESSMENT    Smoking Status:  Social History     Tobacco Use   Smoking Status Never Smoker   Smokeless Tobacco Never Used     Alcohol Consumption:  Social History     Substance and Sexual Activity   Alcohol Use No     Fall Risk Screen:    STEADI Fall Risk Assessment was completed, and patient is at LOW risk for " falls.Assessment completed on:11/12/2021    Depression Screening:  PHQ-2/PHQ-9 Depression Screening 11/12/2021   Little interest or pleasure in doing things 0   Feeling down, depressed, or hopeless 0   Trouble falling or staying asleep, or sleeping too much -   Feeling tired or having little energy -   Poor appetite or overeating -   Feeling bad about yourself - or that you are a failure or have let yourself or your family down -   Trouble concentrating on things, such as reading the newspaper or watching television -   Moving or speaking so slowly that other people could have noticed. Or the opposite - being so fidgety or restless that you have been moving around a lot more than usual -   Thoughts that you would be better off dead, or of hurting yourself in some way -   Total Score 0   If you checked off any problems, how difficult have these problems made it for you to do your work, take care of things at home, or get along with other people? -       Health Habits and Functional and Cognitive Screening:  Functional & Cognitive Status 11/12/2021   Do you have difficulty preparing food and eating? No   Do you have difficulty bathing yourself, getting dressed or grooming yourself? No   Do you have difficulty using the toilet? No   Do you have difficulty moving around from place to place? No   Do you have trouble with steps or getting out of a bed or a chair? No   Current Diet Limited Junk Food   Dental Exam Up to date   Eye Exam Up to date   Exercise (times per week) 7 times per week   Current Exercises Include Walking   Current Exercise Activities Include -   Do you need help using the phone?  No   Are you deaf or do you have serious difficulty hearing?  No   Do you need help with transportation? No   Do you need help shopping? No   Do you need help preparing meals?  No   Do you need help with housework?  No   Do you need help with laundry? No   Do you need help taking your medications? No   Do you need help  managing money? No   Do you ever drive or ride in a car without wearing a seat belt? No   Have you felt unusual stress, anger or loneliness in the last month? No   Who do you live with? Alone   If you need help, do you have trouble finding someone available to you? No   Have you been bothered in the last four weeks by sexual problems? No   Do you have difficulty concentrating, remembering or making decisions? No       Age-appropriate Screening Schedule:  Refer to the list below for future screening recommendations based on patient's age, sex and/or medical conditions. Orders for these recommended tests are listed in the plan section. The patient has been provided with a written plan.    Health Maintenance   Topic Date Due   • TDAP/TD VACCINES (1 - Tdap) Never done   • ZOSTER VACCINE (1 of 2) Never done   • DXA SCAN  10/06/2017   • MAMMOGRAM  Never done   • LIPID PANEL  05/10/2022   • INFLUENZA VACCINE  Completed              Assessment/Plan   CMS Preventative Services Quick Reference  Risk Factors Identified During Encounter  Cardiovascular Disease  The above risks/problems have been discussed with the patient.  Follow up actions/plans if indicated are seen below in the Assessment/Plan Section.  Pertinent information has been shared with the patient in the After Visit Summary.    Diagnoses and all orders for this visit:    1. Medicare annual wellness visit, subsequent (Primary)    2. Healthcare maintenance  -     CBC & Differential  -     Comprehensive Metabolic Panel  -     Hemoglobin A1c  -     Thyroid Panel With TSH  -     Lipid Panel With LDL / HDL Ratio    3. Hypertension, unspecified type  -     CBC & Differential  -     Comprehensive Metabolic Panel  -     metoprolol succinate XL (TOPROL-XL) 50 MG 24 hr tablet; Take 1 tablet by mouth Daily.  Dispense: 90 tablet; Refill: 3  -     hydroCHLOROthiazide (HYDRODIURIL) 25 MG tablet; Take 0.5 tablets by mouth Daily.  Dispense: 45 tablet; Refill: 1    4. Vitamin D  deficiency  -     Vitamin D 25 Hydroxy    5. Hyperlipidemia, unspecified hyperlipidemia type  -     Lipid Panel With LDL / HDL Ratio  -     pravastatin (PRAVACHOL) 40 MG tablet; Take 1 tablet by mouth Daily.  Dispense: 90 tablet; Refill: 3    6. Primary insomnia  -     amitriptyline (ELAVIL) 10 MG tablet; Take 1 tablet by mouth At Night As Needed (take 10 mg by mouth at night as needed).  Dispense: 90 tablet; Refill: 2        Follow Up:   No follow-ups on file.     An After Visit Summary and PPPS were made available to the patient.

## 2021-11-20 DIAGNOSIS — D64.9 ANEMIA, UNSPECIFIED TYPE: Primary | ICD-10-CM

## 2021-11-20 NOTE — PROGRESS NOTES
Please inform the patient of the following abnormal results. Worsening hemoglobin. Unclear the cause of patients anemia. But we can refer to hematology, for further evaluation.

## 2021-12-09 ENCOUNTER — APPOINTMENT (OUTPATIENT)
Dept: LAB | Facility: HOSPITAL | Age: 76
End: 2021-12-09

## 2022-01-21 RX ORDER — MELOXICAM 15 MG/1
TABLET ORAL
Qty: 90 TABLET | Refills: 1 | Status: SHIPPED | OUTPATIENT
Start: 2022-01-21 | End: 2022-07-18

## 2022-05-11 ENCOUNTER — LAB (OUTPATIENT)
Dept: LAB | Facility: HOSPITAL | Age: 77
End: 2022-05-11

## 2022-05-11 ENCOUNTER — OFFICE VISIT (OUTPATIENT)
Dept: INTERNAL MEDICINE | Facility: CLINIC | Age: 77
End: 2022-05-11

## 2022-05-11 VITALS
SYSTOLIC BLOOD PRESSURE: 120 MMHG | TEMPERATURE: 98 F | HEIGHT: 64 IN | BODY MASS INDEX: 20.92 KG/M2 | RESPIRATION RATE: 16 BRPM | DIASTOLIC BLOOD PRESSURE: 66 MMHG | WEIGHT: 122.5 LBS | OXYGEN SATURATION: 99 % | HEART RATE: 69 BPM

## 2022-05-11 DIAGNOSIS — E78.5 HYPERLIPIDEMIA, UNSPECIFIED HYPERLIPIDEMIA TYPE: ICD-10-CM

## 2022-05-11 DIAGNOSIS — I10 HYPERTENSION, UNSPECIFIED TYPE: Primary | ICD-10-CM

## 2022-05-11 LAB
ALBUMIN SERPL-MCNC: 4.6 G/DL (ref 3.5–5.2)
ALBUMIN/GLOB SERPL: 2.1 G/DL
ALP SERPL-CCNC: 95 U/L (ref 39–117)
ALT SERPL W P-5'-P-CCNC: 13 U/L (ref 1–33)
ANION GAP SERPL CALCULATED.3IONS-SCNC: 10 MMOL/L (ref 5–15)
AST SERPL-CCNC: 20 U/L (ref 1–32)
BASOPHILS # BLD AUTO: 0.09 10*3/MM3 (ref 0–0.2)
BASOPHILS NFR BLD AUTO: 1.2 % (ref 0–1.5)
BILIRUB SERPL-MCNC: 0.2 MG/DL (ref 0–1.2)
BUN SERPL-MCNC: 18 MG/DL (ref 8–23)
BUN/CREAT SERPL: 20 (ref 7–25)
CALCIUM SPEC-SCNC: 9.7 MG/DL (ref 8.6–10.5)
CHLORIDE SERPL-SCNC: 103 MMOL/L (ref 98–107)
CHOLEST SERPL-MCNC: 175 MG/DL (ref 0–200)
CO2 SERPL-SCNC: 27 MMOL/L (ref 22–29)
CREAT SERPL-MCNC: 0.9 MG/DL (ref 0.57–1)
DEPRECATED RDW RBC AUTO: 43.7 FL (ref 37–54)
EGFRCR SERPLBLD CKD-EPI 2021: 66.8 ML/MIN/1.73
EOSINOPHIL # BLD AUTO: 0.41 10*3/MM3 (ref 0–0.4)
EOSINOPHIL NFR BLD AUTO: 5.3 % (ref 0.3–6.2)
ERYTHROCYTE [DISTWIDTH] IN BLOOD BY AUTOMATED COUNT: 14.8 % (ref 12.3–15.4)
GLOBULIN UR ELPH-MCNC: 2.2 GM/DL
GLUCOSE SERPL-MCNC: 97 MG/DL (ref 65–99)
HCT VFR BLD AUTO: 30.8 % (ref 34–46.6)
HDLC SERPL-MCNC: 50 MG/DL (ref 40–60)
HGB BLD-MCNC: 9.4 G/DL (ref 12–15.9)
IMM GRANULOCYTES # BLD AUTO: 0.02 10*3/MM3 (ref 0–0.05)
IMM GRANULOCYTES NFR BLD AUTO: 0.3 % (ref 0–0.5)
LDLC SERPL CALC-MCNC: 109 MG/DL (ref 0–100)
LDLC/HDLC SERPL: 2.16 {RATIO}
LYMPHOCYTES # BLD AUTO: 1.15 10*3/MM3 (ref 0.7–3.1)
LYMPHOCYTES NFR BLD AUTO: 14.8 % (ref 19.6–45.3)
MCH RBC QN AUTO: 24.6 PG (ref 26.6–33)
MCHC RBC AUTO-ENTMCNC: 30.5 G/DL (ref 31.5–35.7)
MCV RBC AUTO: 80.6 FL (ref 79–97)
MONOCYTES # BLD AUTO: 0.87 10*3/MM3 (ref 0.1–0.9)
MONOCYTES NFR BLD AUTO: 11.2 % (ref 5–12)
NEUTROPHILS NFR BLD AUTO: 5.23 10*3/MM3 (ref 1.7–7)
NEUTROPHILS NFR BLD AUTO: 67.2 % (ref 42.7–76)
NRBC BLD AUTO-RTO: 0 /100 WBC (ref 0–0.2)
PLATELET # BLD AUTO: 365 10*3/MM3 (ref 140–450)
PMV BLD AUTO: 10.5 FL (ref 6–12)
POTASSIUM SERPL-SCNC: 4 MMOL/L (ref 3.5–5.2)
PROT SERPL-MCNC: 6.8 G/DL (ref 6–8.5)
RBC # BLD AUTO: 3.82 10*6/MM3 (ref 3.77–5.28)
SODIUM SERPL-SCNC: 140 MMOL/L (ref 136–145)
TRIGL SERPL-MCNC: 84 MG/DL (ref 0–150)
VLDLC SERPL-MCNC: 16 MG/DL (ref 5–40)
WBC NRBC COR # BLD: 7.77 10*3/MM3 (ref 3.4–10.8)

## 2022-05-11 PROCEDURE — 36415 COLL VENOUS BLD VENIPUNCTURE: CPT | Performed by: FAMILY MEDICINE

## 2022-05-11 PROCEDURE — 99214 OFFICE O/P EST MOD 30 MIN: CPT | Performed by: FAMILY MEDICINE

## 2022-05-11 PROCEDURE — 80053 COMPREHEN METABOLIC PANEL: CPT | Performed by: FAMILY MEDICINE

## 2022-05-11 PROCEDURE — 80061 LIPID PANEL: CPT | Performed by: FAMILY MEDICINE

## 2022-05-11 PROCEDURE — 85025 COMPLETE CBC W/AUTO DIFF WBC: CPT | Performed by: FAMILY MEDICINE

## 2022-05-11 RX ORDER — ROSUVASTATIN CALCIUM 10 MG/1
10 TABLET, COATED ORAL DAILY
Qty: 90 TABLET | Refills: 3 | Status: SHIPPED | OUTPATIENT
Start: 2022-05-11

## 2022-05-11 NOTE — ASSESSMENT & PLAN NOTE
- Her blood pressure today is 120/66. She will continue with HCTZ 12.5mg daily as well as Metoprolol Succinate XL 50mg daily.

## 2022-05-11 NOTE — ASSESSMENT & PLAN NOTE
- Given the patient's side effects with Pravastatin, we will stop the pravastatin regimen and start the patient on Crestor 10mg daily one week after stopping pravastatin.

## 2022-05-11 NOTE — PROGRESS NOTES
"Chief Complaint  Follow-up    Subjective          Gaby Jackson presents to [facility]  History of Present Illness    High blood pressure  The patient is a 76-year-old female who presents to the clinic today for follow-up of her high blood pressure. The patient reports having a Cologuard done in 2020, which she states was negative. She is currently taking Pravastatin 40mg daily, which she states gives her leg cramps. She also takes Coenzyme Q10, hydrochlorothiazide 12.5mg daily, metoprolol succinate XL 50mg daily. She states that she also takes Amitriptyline at night, but only on rare occasions. Aside from pravastatin, she denies any issues or side effects with the above medications.    Knee pain  She reports prior knee injections in November, 2021 with her last dose occurring on December, 2021. She states significant improvement of pain in her left knee, but not so much in her right knee. She notes that she stands for prolonged periods of time at her job. She also states that her knee pain episodes have decreased as well.    Objective   Vital Signs:  /66   Pulse 69   Temp 98 °F (36.7 °C)   Resp 16   Ht 161.3 cm (63.5\")   Wt 55.6 kg (122 lb 8 oz)   SpO2 99%   BMI 21.36 kg/m²     BMI is within normal parameters. No follow-up required.      Physical Exam  Vitals and nursing note reviewed.   Constitutional:       Appearance: She is well-developed.   HENT:      Head: Normocephalic and atraumatic.   Musculoskeletal:      Cervical back: Normal range of motion and neck supple.   Neurological:      Mental Status: She is alert and oriented to person, place, and time.   Psychiatric:         Behavior: Behavior normal.             Assessment and Plan    Diagnoses and all orders for this visit:    1. Hypertension, unspecified type (Primary)  Assessment & Plan:  - Her blood pressure today is 120/66. She will continue with HCTZ 12.5mg daily as well as Metoprolol Succinate XL 50mg daily. "     Orders:  -     Comprehensive Metabolic Panel  -     CBC & Differential  -     Comprehensive Metabolic Panel; Future  -     CBC & Differential; Future    2. Hyperlipidemia, unspecified hyperlipidemia type  Assessment & Plan:  - Given the patient's side effects with Pravastatin, we will stop the pravastatin regimen and start the patient on Crestor 10mg daily one week after stopping pravastatin.    Orders:  -     Lipid Panel With LDL / HDL Ratio  -     rosuvastatin (Crestor) 10 MG tablet; Take 1 tablet by mouth Daily.  Dispense: 90 tablet; Refill: 3  -     Lipid Panel With LDL / HDL Ratio; Future           Follow Up   No follow-ups on file.  Patient was given instructions and counseling regarding her condition or for health maintenance advice. Please see specific information pulled into the AVS if appropriate.     Transcribed from ambient dictation for Maicol Florentino MD by Vernon Sethi.  05/11/22   11:52 EDT    Patient verbalized consent to the visit recording.  I have personally performed the services described in this document as transcribed by the above individual, and it is both accurate and complete.  Vernon Sethi  5/11/2022  11:52 EDT

## 2022-05-18 DIAGNOSIS — D64.9 ANEMIA, UNSPECIFIED TYPE: Primary | ICD-10-CM

## 2022-05-24 NOTE — PROGRESS NOTES
Please inform the patient of the following abnormal results.  Patient's hemoglobin is consistently dropping.  Verify with patient that she is not seeing any bleeding out of her stool.  And that her stools are not black.  In the meantime I would like her to repeat some labs which will include check an iron panel, B12, and folate.

## 2022-06-15 DIAGNOSIS — I10 HYPERTENSION, UNSPECIFIED TYPE: ICD-10-CM

## 2022-06-15 RX ORDER — HYDROCHLOROTHIAZIDE 25 MG/1
TABLET ORAL
Qty: 45 TABLET | Refills: 1 | Status: SHIPPED | OUTPATIENT
Start: 2022-06-15 | End: 2022-10-20

## 2022-07-18 RX ORDER — MELOXICAM 15 MG/1
TABLET ORAL
Qty: 90 TABLET | Refills: 1 | Status: SHIPPED | OUTPATIENT
Start: 2022-07-18 | End: 2023-01-18

## 2022-08-17 ENCOUNTER — OFFICE VISIT (OUTPATIENT)
Dept: INTERNAL MEDICINE | Facility: CLINIC | Age: 77
End: 2022-08-17

## 2022-08-17 ENCOUNTER — LAB (OUTPATIENT)
Dept: LAB | Facility: HOSPITAL | Age: 77
End: 2022-08-17

## 2022-08-17 VITALS
HEART RATE: 55 BPM | BODY MASS INDEX: 21 KG/M2 | DIASTOLIC BLOOD PRESSURE: 70 MMHG | HEIGHT: 63 IN | SYSTOLIC BLOOD PRESSURE: 140 MMHG | WEIGHT: 118.5 LBS | OXYGEN SATURATION: 95 %

## 2022-08-17 DIAGNOSIS — I10 HYPERTENSION, UNSPECIFIED TYPE: ICD-10-CM

## 2022-08-17 DIAGNOSIS — D64.9 ANEMIA, UNSPECIFIED TYPE: ICD-10-CM

## 2022-08-17 DIAGNOSIS — E78.5 HYPERLIPIDEMIA, UNSPECIFIED HYPERLIPIDEMIA TYPE: Primary | ICD-10-CM

## 2022-08-17 LAB
ALBUMIN SERPL-MCNC: 4.3 G/DL (ref 3.5–5.2)
ALBUMIN/GLOB SERPL: 2 G/DL
ALP SERPL-CCNC: 91 U/L (ref 39–117)
ALT SERPL W P-5'-P-CCNC: 15 U/L (ref 1–33)
ANION GAP SERPL CALCULATED.3IONS-SCNC: 10 MMOL/L (ref 5–15)
AST SERPL-CCNC: 22 U/L (ref 1–32)
BILIRUB SERPL-MCNC: 0.3 MG/DL (ref 0–1.2)
BUN SERPL-MCNC: 18 MG/DL (ref 8–23)
BUN/CREAT SERPL: 20.9 (ref 7–25)
CALCIUM SPEC-SCNC: 10.1 MG/DL (ref 8.6–10.5)
CHLORIDE SERPL-SCNC: 104 MMOL/L (ref 98–107)
CHOLEST SERPL-MCNC: 164 MG/DL (ref 0–200)
CO2 SERPL-SCNC: 26 MMOL/L (ref 22–29)
CREAT SERPL-MCNC: 0.86 MG/DL (ref 0.57–1)
EGFRCR SERPLBLD CKD-EPI 2021: 70.6 ML/MIN/1.73
FERRITIN SERPL-MCNC: 22.6 NG/ML (ref 13–150)
FOLATE SERPL-MCNC: 17 NG/ML (ref 4.78–24.2)
GLOBULIN UR ELPH-MCNC: 2.2 GM/DL
GLUCOSE SERPL-MCNC: 87 MG/DL (ref 65–99)
HDLC SERPL-MCNC: 56 MG/DL (ref 40–60)
IRON 24H UR-MRATE: 47 MCG/DL (ref 37–145)
IRON SATN MFR SERPL: 8 % (ref 20–50)
LDLC SERPL CALC-MCNC: 88 MG/DL (ref 0–100)
LDLC/HDLC SERPL: 1.52 {RATIO}
POTASSIUM SERPL-SCNC: 4 MMOL/L (ref 3.5–5.2)
PROT SERPL-MCNC: 6.5 G/DL (ref 6–8.5)
SODIUM SERPL-SCNC: 140 MMOL/L (ref 136–145)
TIBC SERPL-MCNC: 556 MCG/DL (ref 298–536)
TRANSFERRIN SERPL-MCNC: 373 MG/DL (ref 200–360)
TRIGL SERPL-MCNC: 114 MG/DL (ref 0–150)
VIT B12 BLD-MCNC: 439 PG/ML (ref 211–946)
VLDLC SERPL-MCNC: 20 MG/DL (ref 5–40)

## 2022-08-17 PROCEDURE — 82728 ASSAY OF FERRITIN: CPT

## 2022-08-17 PROCEDURE — 84466 ASSAY OF TRANSFERRIN: CPT

## 2022-08-17 PROCEDURE — 82607 VITAMIN B-12: CPT

## 2022-08-17 PROCEDURE — 83921 ORGANIC ACID SINGLE QUANT: CPT

## 2022-08-17 PROCEDURE — 83540 ASSAY OF IRON: CPT

## 2022-08-17 PROCEDURE — 80053 COMPREHEN METABOLIC PANEL: CPT | Performed by: FAMILY MEDICINE

## 2022-08-17 PROCEDURE — 99214 OFFICE O/P EST MOD 30 MIN: CPT | Performed by: FAMILY MEDICINE

## 2022-08-17 PROCEDURE — 80061 LIPID PANEL: CPT | Performed by: FAMILY MEDICINE

## 2022-08-17 PROCEDURE — 82746 ASSAY OF FOLIC ACID SERUM: CPT

## 2022-08-17 PROCEDURE — 36415 COLL VENOUS BLD VENIPUNCTURE: CPT

## 2022-08-17 NOTE — PROGRESS NOTES
"Chief Complaint  3 Month Medication Check     Subjective        Gaby Jackson presents to [facility]  History of Present Illness    Patient is currently taking Crestor 10 mg daily for her hyperlipidemia.  This was changed due to the muscle intolerance/muscle cramps she was experiencing with her previous statin.  She states that switching this medicine has essentially taken away all the cramps that she was getting.  Overall she is pleased with this.    She does have some essential hypertension.  Blood pressure is 140/70.  She is currently on hydrochlorothiazide 25 mg daily.  She is also taking metoprolol succinate XL 50 mg daily.  She denies any side effects of the medication.    Patient does note that she has been feeling weak.  Last office visit back in May I did notice that she was a bit anemic.  She will recheck some labs at today's office visit to see if anemia still present, including further work-up with iron panel and B12.    Objective   Vital Signs:  /70 (BP Location: Left arm, Patient Position: Sitting, Cuff Size: Adult)   Pulse 55   Ht 160 cm (63\")   Wt 53.8 kg (118 lb 8 oz)   SpO2 95%   BMI 20.99 kg/m²   Estimated body mass index is 20.99 kg/m² as calculated from the following:    Height as of this encounter: 160 cm (63\").    Weight as of this encounter: 53.8 kg (118 lb 8 oz).    BMI is within normal parameters. No other follow-up for BMI required.      Physical Exam  Vitals and nursing note reviewed.   Constitutional:       Appearance: She is well-developed.   HENT:      Head: Normocephalic and atraumatic.   Musculoskeletal:      Cervical back: Normal range of motion and neck supple.   Neurological:      Mental Status: She is alert and oriented to person, place, and time.   Psychiatric:         Behavior: Behavior normal.        Result Review :                Assessment and Plan   Diagnoses and all orders for this visit:    1. Hyperlipidemia, unspecified " hyperlipidemia type (Primary)  -     Lipid Panel With LDL / HDL Ratio  -     Lipid Panel With LDL / HDL Ratio    2. Hypertension, unspecified type  -     Comprehensive Metabolic Panel  -     Comprehensive Metabolic Panel  -     Lipid Panel With LDL / HDL Ratio    3. Anemia, unspecified type  -     Ferritin; Future  -     Iron Profile; Future  -     Vitamin B12; Future  -     Folate; Future  -     Methylmalonic Acid, Serum; Future      She does happen to have a history of anemia, and is unclear whether some of the tiredness that she has is from his anemia.  I did discuss with her that I would like to get several labs done at today's visit.  For the essential hypertension, continue current blood pressure medicines.  For the hyperlipidemia, continue Crestor, and will check a lipid panel and a CMP at today's visit.       Follow Up   No follow-ups on file.  Patient was given instructions and counseling regarding her condition or for health maintenance advice. Please see specific information pulled into the AVS if appropriate.

## 2022-08-21 NOTE — PROGRESS NOTES
Please inform the patient of the following abnormal results. Iron on low end of normal and did have anemia. Needs to do ferrous sulfate 325mg daily. Will like to recheck in 3 mos.

## 2022-08-22 LAB — METHYLMALONATE SERPL-SCNC: 280 NMOL/L (ref 0–378)

## 2022-10-20 DIAGNOSIS — I10 HYPERTENSION, UNSPECIFIED TYPE: ICD-10-CM

## 2022-10-20 RX ORDER — HYDROCHLOROTHIAZIDE 25 MG/1
TABLET ORAL
Qty: 45 TABLET | Refills: 1 | Status: SHIPPED | OUTPATIENT
Start: 2022-10-20

## 2022-11-15 ENCOUNTER — OFFICE VISIT (OUTPATIENT)
Dept: INTERNAL MEDICINE | Facility: CLINIC | Age: 77
End: 2022-11-15

## 2022-11-15 ENCOUNTER — LAB (OUTPATIENT)
Dept: LAB | Facility: HOSPITAL | Age: 77
End: 2022-11-15

## 2022-11-15 VITALS
BODY MASS INDEX: 21.26 KG/M2 | SYSTOLIC BLOOD PRESSURE: 120 MMHG | OXYGEN SATURATION: 97 % | HEIGHT: 63 IN | DIASTOLIC BLOOD PRESSURE: 70 MMHG | WEIGHT: 120 LBS | HEART RATE: 55 BPM

## 2022-11-15 DIAGNOSIS — I10 HYPERTENSION, UNSPECIFIED TYPE: ICD-10-CM

## 2022-11-15 DIAGNOSIS — E61.1 IRON DEFICIENCY: ICD-10-CM

## 2022-11-15 DIAGNOSIS — M17.0 PRIMARY OSTEOARTHRITIS OF BOTH KNEES: Primary | ICD-10-CM

## 2022-11-15 DIAGNOSIS — E78.5 HYPERLIPIDEMIA, UNSPECIFIED HYPERLIPIDEMIA TYPE: ICD-10-CM

## 2022-11-15 DIAGNOSIS — Z12.31 ENCOUNTER FOR SCREENING MAMMOGRAM FOR MALIGNANT NEOPLASM OF BREAST: ICD-10-CM

## 2022-11-15 LAB
ALBUMIN SERPL-MCNC: 4.3 G/DL (ref 3.5–5.2)
ALBUMIN/GLOB SERPL: 1.6 G/DL
ALP SERPL-CCNC: 93 U/L (ref 39–117)
ALT SERPL W P-5'-P-CCNC: 17 U/L (ref 1–33)
ANION GAP SERPL CALCULATED.3IONS-SCNC: 13.1 MMOL/L (ref 5–15)
AST SERPL-CCNC: 23 U/L (ref 1–32)
BASOPHILS # BLD AUTO: 0.09 10*3/MM3 (ref 0–0.2)
BASOPHILS NFR BLD AUTO: 0.9 % (ref 0–1.5)
BILIRUB SERPL-MCNC: 0.3 MG/DL (ref 0–1.2)
BUN SERPL-MCNC: 20 MG/DL (ref 8–23)
BUN/CREAT SERPL: 23.8 (ref 7–25)
CALCIUM SPEC-SCNC: 10 MG/DL (ref 8.6–10.5)
CHLORIDE SERPL-SCNC: 100 MMOL/L (ref 98–107)
CHOLEST SERPL-MCNC: 165 MG/DL (ref 0–200)
CO2 SERPL-SCNC: 26.9 MMOL/L (ref 22–29)
CREAT SERPL-MCNC: 0.84 MG/DL (ref 0.57–1)
DEPRECATED RDW RBC AUTO: 45.8 FL (ref 37–54)
EGFRCR SERPLBLD CKD-EPI 2021: 72.1 ML/MIN/1.73
EOSINOPHIL # BLD AUTO: 0.72 10*3/MM3 (ref 0–0.4)
EOSINOPHIL NFR BLD AUTO: 7.6 % (ref 0.3–6.2)
ERYTHROCYTE [DISTWIDTH] IN BLOOD BY AUTOMATED COUNT: 14.5 % (ref 12.3–15.4)
FERRITIN SERPL-MCNC: 58.1 NG/ML (ref 13–150)
FOLATE SERPL-MCNC: >20 NG/ML (ref 4.78–24.2)
GLOBULIN UR ELPH-MCNC: 2.7 GM/DL
GLUCOSE SERPL-MCNC: 78 MG/DL (ref 65–99)
HCT VFR BLD AUTO: 37.4 % (ref 34–46.6)
HDLC SERPL-MCNC: 61 MG/DL (ref 40–60)
HGB BLD-MCNC: 12.1 G/DL (ref 12–15.9)
IMM GRANULOCYTES # BLD AUTO: 0.02 10*3/MM3 (ref 0–0.05)
IMM GRANULOCYTES NFR BLD AUTO: 0.2 % (ref 0–0.5)
IRON 24H UR-MRATE: 108 MCG/DL (ref 37–145)
IRON SATN MFR SERPL: 23 % (ref 20–50)
LDLC SERPL CALC-MCNC: 88 MG/DL (ref 0–100)
LDLC/HDLC SERPL: 1.43 {RATIO}
LYMPHOCYTES # BLD AUTO: 1.21 10*3/MM3 (ref 0.7–3.1)
LYMPHOCYTES NFR BLD AUTO: 12.7 % (ref 19.6–45.3)
MCH RBC QN AUTO: 27.8 PG (ref 26.6–33)
MCHC RBC AUTO-ENTMCNC: 32.4 G/DL (ref 31.5–35.7)
MCV RBC AUTO: 85.8 FL (ref 79–97)
MONOCYTES # BLD AUTO: 0.79 10*3/MM3 (ref 0.1–0.9)
MONOCYTES NFR BLD AUTO: 8.3 % (ref 5–12)
NEUTROPHILS NFR BLD AUTO: 6.67 10*3/MM3 (ref 1.7–7)
NEUTROPHILS NFR BLD AUTO: 70.3 % (ref 42.7–76)
NRBC BLD AUTO-RTO: 0 /100 WBC (ref 0–0.2)
PLATELET # BLD AUTO: 340 10*3/MM3 (ref 140–450)
PMV BLD AUTO: 10 FL (ref 6–12)
POTASSIUM SERPL-SCNC: 4 MMOL/L (ref 3.5–5.2)
PROT SERPL-MCNC: 7 G/DL (ref 6–8.5)
RBC # BLD AUTO: 4.36 10*6/MM3 (ref 3.77–5.28)
SODIUM SERPL-SCNC: 140 MMOL/L (ref 136–145)
TIBC SERPL-MCNC: 463 MCG/DL (ref 298–536)
TRANSFERRIN SERPL-MCNC: 311 MG/DL (ref 200–360)
TRIGL SERPL-MCNC: 83 MG/DL (ref 0–150)
VIT B12 BLD-MCNC: 520 PG/ML (ref 211–946)
VLDLC SERPL-MCNC: 16 MG/DL (ref 5–40)
WBC NRBC COR # BLD: 9.5 10*3/MM3 (ref 3.4–10.8)

## 2022-11-15 PROCEDURE — 36415 COLL VENOUS BLD VENIPUNCTURE: CPT | Performed by: FAMILY MEDICINE

## 2022-11-15 PROCEDURE — 84466 ASSAY OF TRANSFERRIN: CPT | Performed by: FAMILY MEDICINE

## 2022-11-15 PROCEDURE — 82607 VITAMIN B-12: CPT | Performed by: FAMILY MEDICINE

## 2022-11-15 PROCEDURE — 82746 ASSAY OF FOLIC ACID SERUM: CPT | Performed by: FAMILY MEDICINE

## 2022-11-15 PROCEDURE — 80061 LIPID PANEL: CPT

## 2022-11-15 PROCEDURE — 82728 ASSAY OF FERRITIN: CPT | Performed by: FAMILY MEDICINE

## 2022-11-15 PROCEDURE — 85025 COMPLETE CBC W/AUTO DIFF WBC: CPT

## 2022-11-15 PROCEDURE — G0009 ADMIN PNEUMOCOCCAL VACCINE: HCPCS | Performed by: FAMILY MEDICINE

## 2022-11-15 PROCEDURE — 99214 OFFICE O/P EST MOD 30 MIN: CPT | Performed by: FAMILY MEDICINE

## 2022-11-15 PROCEDURE — 83540 ASSAY OF IRON: CPT | Performed by: FAMILY MEDICINE

## 2022-11-15 PROCEDURE — 80053 COMPREHEN METABOLIC PANEL: CPT

## 2022-11-15 PROCEDURE — 90677 PCV20 VACCINE IM: CPT | Performed by: FAMILY MEDICINE

## 2022-11-15 NOTE — PROGRESS NOTES
"Chief Complaint  follow up lab work    Subjective          Gaby Jackson presents to [facility]  History of Present Illness    Gaby Jackson is a 77-year-old female who presents today for a follow-up.    Arthritis  The patient reports that she is doing well overall. She states that she has arthritis in her knee, which causes her difficulty sleeping. She denies receiving any steroid injections in her knees. She has an appointment with her knee doctor in 6 months.    Iron deficiency  The patient continues to take her iron supplement.    Hyperlipidemia  The patient continues to take Crestor. She denies any side effects from the medication.    Leg cramps  The patient states that her leg cramps have improved since her last visit.    Health maintenance  The patient is due for her pneumonia vaccine. She is due for her mammogram. She has received her influenza vaccine. She is due for her pneumonia vaccine.    Grief  The patient reports that her daughter passed away in 08/2022. She notes her son passed away 08/2021.She states that she is doing well.    Objective   Vital Signs:   /70 (BP Location: Left arm, Patient Position: Sitting, Cuff Size: Adult)   Pulse 55   Ht 160 cm (62.99\")   Wt 54.4 kg (120 lb)   SpO2 97%   BMI 21.26 kg/m²     Physical Exam  Vitals and nursing note reviewed.   Constitutional:       Appearance: She is well-developed.   HENT:      Head: Normocephalic and atraumatic.   Musculoskeletal:      Cervical back: Normal range of motion and neck supple.   Neurological:      Mental Status: She is alert and oriented to person, place, and time.   Psychiatric:         Behavior: Behavior normal.        Result Review :                 Assessment and Plan    Diagnoses and all orders for this visit:    1. Primary osteoarthritis of both knees (Primary)    2. Iron deficiency  -     Cancel: Ferritin; Future  -     Cancel: Iron Profile; Future  -     Iron Profile; Future  -     " Ferritin; Future  -     Cancel: Ferritin  -     Cancel: Iron Profile    3. Hyperlipidemia, unspecified hyperlipidemia type  -     Cancel: Lipid Panel With LDL / HDL Ratio; Future  -     Lipid Panel With LDL / HDL Ratio; Future  -     Cancel: Lipid Panel With LDL / HDL Ratio    4. Hypertension, unspecified type  -     Cancel: Comprehensive Metabolic Panel; Future  -     Cancel: CBC & Differential; Future  -     CBC & Differential; Future  -     Comprehensive Metabolic Panel; Future  -     Cancel: Comprehensive Metabolic Panel  -     Cancel: CBC & Differential    5. Encounter for screening mammogram for malignant neoplasm of breast  -     Mammo Screening Bilateral With CAD    Other orders  -     Pneumococcal Conjugate Vaccine 20-Valent (PCV20)        Iron deficiency anemia  She will continue taking iron supplements.    Hyperlipidemia  She will continue taking Crestor.    Health maintenance  She will receive the pneumonia vaccine today.   She is due for a mammogram, and we will order this today.   We will obtain blood work today.    Arthritis  She will continue to follow up with her knee specialist.      Follow Up   No follow-ups on file.  Patient was given instructions and counseling regarding her condition or for health maintenance advice. Please see specific information pulled into the AVS if appropriate.         Transcribed from ambient dictation for Maicol Florentino MD by Jessica Escalante.  11/15/22   12:27 EST    Patient or patient representative verbalized consent to the visit recording.  I have personally performed the services described in this document as transcribed by the above individual, and it is both accurate and complete.

## 2022-11-17 ENCOUNTER — TRANSCRIBE ORDERS (OUTPATIENT)
Dept: ADMINISTRATIVE | Facility: HOSPITAL | Age: 77
End: 2022-11-17

## 2022-11-17 DIAGNOSIS — Z12.31 ENCOUNTER FOR SCREENING MAMMOGRAM FOR MALIGNANT NEOPLASM OF BREAST: Primary | ICD-10-CM

## 2022-12-14 ENCOUNTER — HOSPITAL ENCOUNTER (OUTPATIENT)
Dept: MAMMOGRAPHY | Facility: HOSPITAL | Age: 77
Discharge: HOME | End: 2022-12-14
Admitting: FAMILY MEDICINE

## 2022-12-14 DIAGNOSIS — Z12.31 ENCOUNTER FOR SCREENING MAMMOGRAM FOR MALIGNANT NEOPLASM OF BREAST: ICD-10-CM

## 2022-12-14 PROCEDURE — 77067 SCR MAMMO BI INCL CAD: CPT

## 2022-12-14 PROCEDURE — 77063 BREAST TOMOSYNTHESIS BI: CPT

## 2022-12-17 NOTE — PROGRESS NOTES
IMPRESSION/RECOMMENDATION(S):  No mammographic evidence of malignancy. Recommend annual screening  mammogram in one year.    BI-RADS Category 1: Negative

## 2023-01-18 RX ORDER — MELOXICAM 15 MG/1
TABLET ORAL
Qty: 90 TABLET | Refills: 1 | Status: SHIPPED | OUTPATIENT
Start: 2023-01-18

## 2023-01-19 DIAGNOSIS — I10 HYPERTENSION, UNSPECIFIED TYPE: ICD-10-CM

## 2023-01-22 RX ORDER — METOPROLOL SUCCINATE 50 MG/1
TABLET, EXTENDED RELEASE ORAL
Qty: 90 TABLET | Refills: 3 | Status: SHIPPED | OUTPATIENT
Start: 2023-01-22

## 2023-02-15 ENCOUNTER — OFFICE VISIT (OUTPATIENT)
Dept: INTERNAL MEDICINE | Facility: CLINIC | Age: 78
End: 2023-02-15
Payer: MEDICARE

## 2023-02-15 VITALS
DIASTOLIC BLOOD PRESSURE: 70 MMHG | OXYGEN SATURATION: 95 % | TEMPERATURE: 95.2 F | BODY MASS INDEX: 21.97 KG/M2 | WEIGHT: 119.4 LBS | HEART RATE: 59 BPM | SYSTOLIC BLOOD PRESSURE: 136 MMHG | RESPIRATION RATE: 16 BRPM | HEIGHT: 62 IN

## 2023-02-15 DIAGNOSIS — E78.5 HYPERLIPIDEMIA, UNSPECIFIED HYPERLIPIDEMIA TYPE: ICD-10-CM

## 2023-02-15 DIAGNOSIS — I10 HYPERTENSION, UNSPECIFIED TYPE: Primary | ICD-10-CM

## 2023-02-15 DIAGNOSIS — E61.1 IRON DEFICIENCY: ICD-10-CM

## 2023-02-15 PROCEDURE — 99214 OFFICE O/P EST MOD 30 MIN: CPT | Performed by: FAMILY MEDICINE

## 2023-02-15 NOTE — PROGRESS NOTES
"Chief Complaint  Follow-up (Lab work)    Subjective        Gaby Jackson presents to [facility]  History of Present Illness    Patient presents today's office visit with a history having hyperlipidemia, she is currently taking Crestor 10 mg daily.  Patient denies any side effects of the medication.    Patient has a history having essential hypertension, patient is currently taking hydrochlorothiazide 25 mg daily, metoprolol succinate XL 50 mg daily, and has a blood pressure 136/70.  Patient denies any side effects of the medication.    Patient has a history of having iron deficiency, she was currently taking ferrous sulfate 325 mg daily.  She states that for the first month she seems to have done fairly well, but after that she started having these GI side effects that lasted her for over a month until the medications were finished.  She believes they are all related to the GI side effects.  She notes it typically iron will cause constipation, however for her she started having a lot of diarrhea.  Once she finished the iron for the month, she noted that her diarrhea got better.  She does not believe it was related to any foods, because it lasted well over a month.    Objective   Vital Signs:  /70   Pulse 59   Temp 95.2 °F (35.1 °C)   Resp 16   Ht 157.5 cm (62\")   Wt 54.2 kg (119 lb 6.4 oz)   SpO2 95%   BMI 21.84 kg/m²   Estimated body mass index is 21.84 kg/m² as calculated from the following:    Height as of this encounter: 157.5 cm (62\").    Weight as of this encounter: 54.2 kg (119 lb 6.4 oz).       BMI is within normal parameters. No other follow-up for BMI required.      Physical Exam  Vitals and nursing note reviewed.   Constitutional:       Appearance: She is well-developed.   HENT:      Head: Normocephalic and atraumatic.   Musculoskeletal:      Cervical back: Normal range of motion and neck supple.   Neurological:      Mental Status: She is alert and oriented to " person, place, and time.   Psychiatric:         Behavior: Behavior normal.        Result Review :                   Assessment and Plan   Diagnoses and all orders for this visit:    1. Hypertension, unspecified type (Primary)  -     Comprehensive Metabolic Panel  -     CBC & Differential    2. Hyperlipidemia, unspecified hyperlipidemia type  -     Lipid Panel With LDL / HDL Ratio    3. Iron deficiency  -     Iron Profile  -     CBC & Differential  -     Ferritin    At today's office visit for iron deficiency, will check iron panel, and if she still iron deficient, we may need to try different iron salt that is different than ferrous sulfate.  For the essential hypertension, continue taking hydrochlorothiazide and metoprolol succinate.  For the hyperlipidemia, continue Crestor.  We will check some labs at today's visit.         Follow Up   No follow-ups on file.  Patient was given instructions and counseling regarding her condition or for health maintenance advice. Please see specific information pulled into the AVS if appropriate.

## 2023-02-16 LAB
ALBUMIN SERPL-MCNC: 4.7 G/DL (ref 3.7–4.7)
ALBUMIN/GLOB SERPL: 2.2 {RATIO} (ref 1.2–2.2)
ALP SERPL-CCNC: 99 IU/L (ref 44–121)
ALT SERPL-CCNC: 16 IU/L (ref 0–32)
AST SERPL-CCNC: 22 IU/L (ref 0–40)
BASOPHILS # BLD AUTO: 0.1 X10E3/UL (ref 0–0.2)
BASOPHILS NFR BLD AUTO: 1 %
BILIRUB SERPL-MCNC: 0.4 MG/DL (ref 0–1.2)
BUN SERPL-MCNC: 17 MG/DL (ref 8–27)
BUN/CREAT SERPL: 19 (ref 12–28)
CALCIUM SERPL-MCNC: 9.7 MG/DL (ref 8.7–10.3)
CHLORIDE SERPL-SCNC: 102 MMOL/L (ref 96–106)
CHOLEST SERPL-MCNC: 139 MG/DL (ref 100–199)
CO2 SERPL-SCNC: 28 MMOL/L (ref 20–29)
CREAT SERPL-MCNC: 0.9 MG/DL (ref 0.57–1)
EGFRCR SERPLBLD CKD-EPI 2021: 66 ML/MIN/1.73
EOSINOPHIL # BLD AUTO: 0.5 X10E3/UL (ref 0–0.4)
EOSINOPHIL NFR BLD AUTO: 7 %
ERYTHROCYTE [DISTWIDTH] IN BLOOD BY AUTOMATED COUNT: 12.4 % (ref 11.7–15.4)
FERRITIN SERPL-MCNC: 79 NG/ML (ref 15–150)
GLOBULIN SER CALC-MCNC: 2.1 G/DL (ref 1.5–4.5)
GLUCOSE SERPL-MCNC: 81 MG/DL (ref 70–99)
HCT VFR BLD AUTO: 35.8 % (ref 34–46.6)
HDLC SERPL-MCNC: 52 MG/DL
HGB BLD-MCNC: 11.8 G/DL (ref 11.1–15.9)
IMM GRANULOCYTES # BLD AUTO: 0 X10E3/UL (ref 0–0.1)
IMM GRANULOCYTES NFR BLD AUTO: 0 %
IRON SATN MFR SERPL: 24 % (ref 15–55)
IRON SERPL-MCNC: 83 UG/DL (ref 27–139)
LDLC SERPL CALC-MCNC: 72 MG/DL (ref 0–99)
LDLC/HDLC SERPL: 1.4 RATIO (ref 0–3.2)
LYMPHOCYTES # BLD AUTO: 1.2 X10E3/UL (ref 0.7–3.1)
LYMPHOCYTES NFR BLD AUTO: 17 %
MCH RBC QN AUTO: 29.3 PG (ref 26.6–33)
MCHC RBC AUTO-ENTMCNC: 33 G/DL (ref 31.5–35.7)
MCV RBC AUTO: 89 FL (ref 79–97)
MONOCYTES # BLD AUTO: 0.8 X10E3/UL (ref 0.1–0.9)
MONOCYTES NFR BLD AUTO: 10 %
NEUTROPHILS # BLD AUTO: 4.9 X10E3/UL (ref 1.4–7)
NEUTROPHILS NFR BLD AUTO: 65 %
PLATELET # BLD AUTO: 303 X10E3/UL (ref 150–450)
POTASSIUM SERPL-SCNC: 3.8 MMOL/L (ref 3.5–5.2)
PROT SERPL-MCNC: 6.8 G/DL (ref 6–8.5)
RBC # BLD AUTO: 4.03 X10E6/UL (ref 3.77–5.28)
SODIUM SERPL-SCNC: 144 MMOL/L (ref 134–144)
TIBC SERPL-MCNC: 347 UG/DL (ref 250–450)
TRIGL SERPL-MCNC: 75 MG/DL (ref 0–149)
UIBC SERPL-MCNC: 264 UG/DL (ref 118–369)
VLDLC SERPL CALC-MCNC: 15 MG/DL (ref 5–40)
WBC # BLD AUTO: 7.5 X10E3/UL (ref 3.4–10.8)

## 2023-04-27 DIAGNOSIS — I10 HYPERTENSION, UNSPECIFIED TYPE: ICD-10-CM

## 2023-04-28 RX ORDER — HYDROCHLOROTHIAZIDE 25 MG/1
TABLET ORAL
Qty: 45 TABLET | Refills: 1 | Status: SHIPPED | OUTPATIENT
Start: 2023-04-28

## 2023-04-28 RX ORDER — MELOXICAM 15 MG/1
TABLET ORAL
Qty: 90 TABLET | Refills: 1 | Status: SHIPPED | OUTPATIENT
Start: 2023-04-28

## 2023-05-07 DIAGNOSIS — E78.5 HYPERLIPIDEMIA, UNSPECIFIED HYPERLIPIDEMIA TYPE: ICD-10-CM

## 2023-05-08 RX ORDER — ROSUVASTATIN CALCIUM 10 MG/1
TABLET, COATED ORAL
Qty: 90 TABLET | Refills: 3 | Status: SHIPPED | OUTPATIENT
Start: 2023-05-08

## 2023-09-25 ENCOUNTER — OFFICE VISIT (OUTPATIENT)
Dept: INTERNAL MEDICINE | Facility: CLINIC | Age: 78
End: 2023-09-25

## 2023-09-25 ENCOUNTER — LAB (OUTPATIENT)
Dept: LAB | Facility: HOSPITAL | Age: 78
End: 2023-09-25
Payer: MEDICARE

## 2023-09-25 VITALS
BODY MASS INDEX: 22.45 KG/M2 | SYSTOLIC BLOOD PRESSURE: 132 MMHG | RESPIRATION RATE: 14 BRPM | OXYGEN SATURATION: 98 % | DIASTOLIC BLOOD PRESSURE: 64 MMHG | HEIGHT: 62 IN | HEART RATE: 65 BPM | WEIGHT: 122 LBS

## 2023-09-25 DIAGNOSIS — Z78.0 POSTMENOPAUSAL: ICD-10-CM

## 2023-09-25 DIAGNOSIS — Z00.00 MEDICARE ANNUAL WELLNESS VISIT, SUBSEQUENT: ICD-10-CM

## 2023-09-25 DIAGNOSIS — Z00.00 WELL WOMAN EXAM (NO GYNECOLOGICAL EXAM): Primary | ICD-10-CM

## 2023-09-25 DIAGNOSIS — Z00.00 HEALTHCARE MAINTENANCE: ICD-10-CM

## 2023-09-25 LAB
ALBUMIN SERPL-MCNC: 4.6 G/DL (ref 3.5–5.2)
ALBUMIN/GLOB SERPL: 1.8 G/DL
ALP SERPL-CCNC: 97 U/L (ref 39–117)
ALT SERPL W P-5'-P-CCNC: 14 U/L (ref 1–33)
ANION GAP SERPL CALCULATED.3IONS-SCNC: 13 MMOL/L (ref 5–15)
AST SERPL-CCNC: 17 U/L (ref 1–32)
BASOPHILS # BLD AUTO: 0.06 10*3/MM3 (ref 0–0.2)
BASOPHILS NFR BLD AUTO: 0.9 % (ref 0–1.5)
BILIRUB SERPL-MCNC: 0.6 MG/DL (ref 0–1.2)
BUN SERPL-MCNC: 15 MG/DL (ref 8–23)
BUN/CREAT SERPL: 13 (ref 7–25)
CALCIUM SPEC-SCNC: 10 MG/DL (ref 8.6–10.5)
CHLORIDE SERPL-SCNC: 96 MMOL/L (ref 98–107)
CHOLEST SERPL-MCNC: 142 MG/DL (ref 0–200)
CO2 SERPL-SCNC: 29 MMOL/L (ref 22–29)
CREAT SERPL-MCNC: 1.15 MG/DL (ref 0.57–1)
DEPRECATED RDW RBC AUTO: 38.8 FL (ref 37–54)
EGFRCR SERPLBLD CKD-EPI 2021: 49.2 ML/MIN/1.73
EOSINOPHIL # BLD AUTO: 0.36 10*3/MM3 (ref 0–0.4)
EOSINOPHIL NFR BLD AUTO: 5.2 % (ref 0.3–6.2)
ERYTHROCYTE [DISTWIDTH] IN BLOOD BY AUTOMATED COUNT: 12.2 % (ref 12.3–15.4)
GLOBULIN UR ELPH-MCNC: 2.6 GM/DL
GLUCOSE SERPL-MCNC: 79 MG/DL (ref 65–99)
HBA1C MFR BLD: 5.6 % (ref 4.8–5.6)
HCT VFR BLD AUTO: 36.4 % (ref 34–46.6)
HDLC SERPL-MCNC: 57 MG/DL (ref 40–60)
HGB BLD-MCNC: 12.1 G/DL (ref 12–15.9)
IMM GRANULOCYTES # BLD AUTO: 0.01 10*3/MM3 (ref 0–0.05)
IMM GRANULOCYTES NFR BLD AUTO: 0.1 % (ref 0–0.5)
LDLC SERPL CALC-MCNC: 68 MG/DL (ref 0–100)
LDLC/HDLC SERPL: 1.18 {RATIO}
LYMPHOCYTES # BLD AUTO: 1.16 10*3/MM3 (ref 0.7–3.1)
LYMPHOCYTES NFR BLD AUTO: 16.6 % (ref 19.6–45.3)
MCH RBC QN AUTO: 28.9 PG (ref 26.6–33)
MCHC RBC AUTO-ENTMCNC: 33.2 G/DL (ref 31.5–35.7)
MCV RBC AUTO: 86.9 FL (ref 79–97)
MONOCYTES # BLD AUTO: 1.09 10*3/MM3 (ref 0.1–0.9)
MONOCYTES NFR BLD AUTO: 15.6 % (ref 5–12)
NEUTROPHILS NFR BLD AUTO: 4.29 10*3/MM3 (ref 1.7–7)
NEUTROPHILS NFR BLD AUTO: 61.6 % (ref 42.7–76)
NRBC BLD AUTO-RTO: 0 /100 WBC (ref 0–0.2)
PLATELET # BLD AUTO: 356 10*3/MM3 (ref 140–450)
PMV BLD AUTO: 10.3 FL (ref 6–12)
POTASSIUM SERPL-SCNC: 3.9 MMOL/L (ref 3.5–5.2)
PROT SERPL-MCNC: 7.2 G/DL (ref 6–8.5)
RBC # BLD AUTO: 4.19 10*6/MM3 (ref 3.77–5.28)
SODIUM SERPL-SCNC: 138 MMOL/L (ref 136–145)
T-UPTAKE NFR SERPL: 1 TBI (ref 0.8–1.3)
T4 SERPL-MCNC: 7.61 MCG/DL (ref 4.5–11.7)
TRIGL SERPL-MCNC: 90 MG/DL (ref 0–150)
TSH SERPL DL<=0.05 MIU/L-ACNC: 0.93 UIU/ML (ref 0.27–4.2)
VLDLC SERPL-MCNC: 17 MG/DL (ref 5–40)
WBC NRBC COR # BLD: 6.97 10*3/MM3 (ref 3.4–10.8)

## 2023-09-25 PROCEDURE — 3075F SYST BP GE 130 - 139MM HG: CPT | Performed by: FAMILY MEDICINE

## 2023-09-25 PROCEDURE — 3078F DIAST BP <80 MM HG: CPT | Performed by: FAMILY MEDICINE

## 2023-09-25 PROCEDURE — G0439 PPPS, SUBSEQ VISIT: HCPCS | Performed by: FAMILY MEDICINE

## 2023-09-25 PROCEDURE — 1159F MED LIST DOCD IN RCRD: CPT | Performed by: FAMILY MEDICINE

## 2023-09-25 PROCEDURE — 84443 ASSAY THYROID STIM HORMONE: CPT | Performed by: FAMILY MEDICINE

## 2023-09-25 PROCEDURE — 83036 HEMOGLOBIN GLYCOSYLATED A1C: CPT | Performed by: FAMILY MEDICINE

## 2023-09-25 PROCEDURE — 84479 ASSAY OF THYROID (T3 OR T4): CPT | Performed by: FAMILY MEDICINE

## 2023-09-25 PROCEDURE — 80053 COMPREHEN METABOLIC PANEL: CPT | Performed by: FAMILY MEDICINE

## 2023-09-25 PROCEDURE — 99397 PER PM REEVAL EST PAT 65+ YR: CPT | Performed by: FAMILY MEDICINE

## 2023-09-25 PROCEDURE — 36415 COLL VENOUS BLD VENIPUNCTURE: CPT | Performed by: FAMILY MEDICINE

## 2023-09-25 PROCEDURE — 85025 COMPLETE CBC W/AUTO DIFF WBC: CPT | Performed by: FAMILY MEDICINE

## 2023-09-25 PROCEDURE — 1160F RVW MEDS BY RX/DR IN RCRD: CPT | Performed by: FAMILY MEDICINE

## 2023-09-25 PROCEDURE — 80061 LIPID PANEL: CPT | Performed by: FAMILY MEDICINE

## 2023-09-25 PROCEDURE — 84436 ASSAY OF TOTAL THYROXINE: CPT | Performed by: FAMILY MEDICINE

## 2023-09-25 PROCEDURE — 1170F FXNL STATUS ASSESSED: CPT | Performed by: FAMILY MEDICINE

## 2023-09-25 NOTE — PROGRESS NOTES
Nai Jackson is a 78 y.o. female and is here for a comprehensive physical exam. The patient reports no problems.    Pt is UTD with annual gyn exam and mammo           Social History:   Social History     Socioeconomic History    Marital status: Single   Tobacco Use    Smoking status: Never    Smokeless tobacco: Never   Vaping Use    Vaping Use: Never used   Substance and Sexual Activity    Alcohol use: No    Drug use: No    Sexual activity: Never       Family History:   Family History   Problem Relation Age of Onset    Lung disease Mother     Hypertension Mother     Emphysema Mother     Heart disease Father     Hypertension Father     Diabetes Sister     Breast cancer Neg Hx        Past Medical History:   Past Medical History:   Diagnosis Date    Arthritis     Asthma     Hyperlipidemia     Hypertension        The following portions of the patient's history were reviewed and updated as appropriate: allergies, current medications, past family history, past medical history, past social history, past surgical history and problem list.    Review of Systems    Review of Systems   Constitutional:  Negative for chills and fever.   HENT:  Negative for congestion, rhinorrhea, sinus pain and sore throat.    Eyes:  Negative for photophobia and visual disturbance.   Respiratory:  Negative for cough, chest tightness and shortness of breath.    Cardiovascular:  Negative for chest pain and palpitations.   Gastrointestinal:  Negative for diarrhea, nausea and vomiting.   Genitourinary:  Negative for dysuria, frequency and urgency.   Skin:  Negative for rash and wound.   Neurological:  Negative for dizziness and syncope.   Psychiatric/Behavioral:  Negative for behavioral problems and confusion.      Objective   Physical Exam  Vitals and nursing note reviewed.   Constitutional:       Appearance: She is well-developed.   HENT:      Head: Normocephalic and atraumatic.      Right Ear: External ear normal.      Left Ear:  External ear normal.   Cardiovascular:      Rate and Rhythm: Normal rate and regular rhythm.      Heart sounds: Normal heart sounds.   Pulmonary:      Effort: Pulmonary effort is normal. No respiratory distress.      Breath sounds: Normal breath sounds.   Abdominal:      Palpations: Abdomen is soft.      Tenderness: There is no abdominal tenderness. There is no guarding.   Musculoskeletal:         General: Normal range of motion.      Cervical back: Normal range of motion and neck supple.   Lymphadenopathy:      Cervical: No cervical adenopathy.   Skin:     General: Skin is warm.   Neurological:      Mental Status: She is alert and oriented to person, place, and time.   Psychiatric:         Behavior: Behavior normal.       Vitals:    09/25/23 1055   BP: 132/64   Pulse: 65   Resp: 14   SpO2: 98%     Body mass index is 22.31 kg/m².      Medications:   Current Outpatient Medications:     acetaminophen (TYLENOL) 325 MG tablet, Take 2 tablets by mouth Daily As Needed for Mild Pain., Disp: , Rfl:     amitriptyline (ELAVIL) 10 MG tablet, Take 1 tablet by mouth At Night As Needed (take 10 mg by mouth at night as needed)., Disp: 90 tablet, Rfl: 2    BIOTIN PO, Take 2 tablets by mouth Daily., Disp: , Rfl:     coenzyme Q10 100 MG capsule, Take 2 capsules by mouth Daily., Disp: , Rfl:     diphenhydrAMINE-acetaminophen (TYLENOL PM)  MG tablet per tablet, Take 1 tablet by mouth At Night As Needed for Sleep., Disp: , Rfl:     glucosamine sulfate 500 MG capsule capsule, Take 3 capsules by mouth Daily., Disp: , Rfl:     hydroCHLOROthiazide (HYDRODIURIL) 25 MG tablet, Take 0.5 tablets by mouth Daily., Disp: 45 tablet, Rfl: 1    meloxicam (MOBIC) 15 MG tablet, TAKE 1 TABLET BY MOUTH EVERY DAY, Disp: 90 tablet, Rfl: 1    metoprolol succinate XL (TOPROL-XL) 50 MG 24 hr tablet, Take 1 tablet by mouth Daily., Disp: 90 tablet, Rfl: 3    metroNIDAZOLE (METROCREAM) 0.75 % cream, APPLY TOPICALLY TO AFFECTED AREA OF FACE TWICE A DAY AS  NEEDED FOR REDNESS, Disp: , Rfl:     minocycline (MINOCIN,DYNACIN) 50 MG capsule, Take 1 capsule by mouth Daily., Disp: , Rfl: 0    ondansetron ODT (ZOFRAN-ODT) 4 MG disintegrating tablet, Take 1 tablet by mouth Every 6 (Six) Hours As Needed for Nausea or Vomiting., Disp: 12 tablet, Rfl: 0    rosuvastatin (CRESTOR) 10 MG tablet, Take 1 tablet by mouth Daily., Disp: 90 tablet, Rfl: 3    triamcinolone (KENALOG) 0.1 % cream, APPLY TO AFFECTED AREA OF EXTREMITIES TWICE A DAY AS NEEDED FOR ITCH, Disp: , Rfl: 0       Assessment & Plan   Healthy female exam.      1. Healthcare Maintenance:  2. Patient Counseling:  --Nutrition: Stressed importance of moderation in sodium/caffeine intake, saturated fat and cholesterol, caloric balance, sufficient intake of fresh fruits, vegetables, fiber, calcium and vit D  --Exercise: Recommended 30 minutes of exercise daily.  --Immunizations reviewed.  --Discussed benefits of screening colonoscopy.    Diagnoses and all orders for this visit:    Well woman exam (no gynecological exam)    Medicare annual wellness visit, subsequent    Healthcare maintenance  -     CBC & Differential  -     Comprehensive Metabolic Panel  -     Hemoglobin A1c  -     Thyroid Panel With TSH  -     Lipid Panel With LDL / HDL Ratio    Postmenopausal  -     DEXA Bone Density Axial        No follow-ups on file.             Dictated utilizing Dragon Voice Recognition Software

## 2023-09-25 NOTE — PROGRESS NOTES
The ABCs of the Annual Wellness Visit  Subsequent Medicare Wellness Visit    Subjective      Gaby Jackson is a 78 y.o. female who presents for a Subsequent Medicare Wellness Visit.    The following portions of the patient's history were reviewed and   updated as appropriate: allergies, current medications, past family history, past medical history, past social history, past surgical history, and problem list.    Compared to one year ago, the patient feels her physical   health is worse.    Compared to one year ago, the patient feels her mental   health is the same.    Recent Hospitalizations:  She was not admitted to the hospital during the last year.       Current Medical Providers:  Patient Care Team:  Maicol Florentino MD as PCP - General (Family Medicine)  Hermann Coreas MD as Consulting Physician (Hematology and Oncology)    Outpatient Medications Prior to Visit   Medication Sig Dispense Refill    acetaminophen (TYLENOL) 325 MG tablet Take 2 tablets by mouth Daily As Needed for Mild Pain.      amitriptyline (ELAVIL) 10 MG tablet Take 1 tablet by mouth At Night As Needed (take 10 mg by mouth at night as needed). 90 tablet 2    BIOTIN PO Take 2 tablets by mouth Daily.      coenzyme Q10 100 MG capsule Take 2 capsules by mouth Daily.      diphenhydrAMINE-acetaminophen (TYLENOL PM)  MG tablet per tablet Take 1 tablet by mouth At Night As Needed for Sleep.      glucosamine sulfate 500 MG capsule capsule Take 3 capsules by mouth Daily.      hydroCHLOROthiazide (HYDRODIURIL) 25 MG tablet Take 0.5 tablets by mouth Daily. 45 tablet 1    meloxicam (MOBIC) 15 MG tablet TAKE 1 TABLET BY MOUTH EVERY DAY 90 tablet 1    metoprolol succinate XL (TOPROL-XL) 50 MG 24 hr tablet Take 1 tablet by mouth Daily. 90 tablet 3    metroNIDAZOLE (METROCREAM) 0.75 % cream APPLY TOPICALLY TO AFFECTED AREA OF FACE TWICE A DAY AS NEEDED FOR REDNESS      minocycline (MINOCIN,DYNACIN) 50 MG capsule Take 1 capsule by mouth Daily.  0     "ondansetron ODT (ZOFRAN-ODT) 4 MG disintegrating tablet Take 1 tablet by mouth Every 6 (Six) Hours As Needed for Nausea or Vomiting. 12 tablet 0    rosuvastatin (CRESTOR) 10 MG tablet Take 1 tablet by mouth Daily. 90 tablet 3    triamcinolone (KENALOG) 0.1 % cream APPLY TO AFFECTED AREA OF EXTREMITIES TWICE A DAY AS NEEDED FOR ITCH  0     No facility-administered medications prior to visit.       No opioid medication identified on active medication list. I have reviewed chart for other potential  high risk medication/s and harmful drug interactions in the elderly.        Aspirin is not on active medication list.  Aspirin use is not indicated based on review of current medical condition/s. Risk of harm outweighs potential benefits.  .    Patient Active Problem List   Diagnosis    Hypertension    Hyperlipidemia    Avitaminosis D    Diplopia    Labyrinthitis of left ear    Fatigue    Cephalalgia    Blood glucose elevated    Primary osteoarthritis of both knees     Advance Care Planning   Advance Care Planning     Advance Directive is not on file.  ACP discussion was held with the patient during this visit. Patient does not have an advance directive, declines further assistance.     Objective    Vitals:    09/25/23 1055   BP: 132/64   BP Location: Left arm   Patient Position: Sitting   Pulse: 65   Resp: 14   SpO2: 98%   Weight: 55.3 kg (122 lb)   Height: 157.5 cm (62.01\")     Estimated body mass index is 22.31 kg/m² as calculated from the following:    Height as of this encounter: 157.5 cm (62.01\").    Weight as of this encounter: 55.3 kg (122 lb).    BMI is within normal parameters. No other follow-up for BMI required.      Does the patient have evidence of cognitive impairment?   No            HEALTH RISK ASSESSMENT    Smoking Status:  Social History     Tobacco Use   Smoking Status Never   Smokeless Tobacco Never     Alcohol Consumption:  Social History     Substance and Sexual Activity   Alcohol Use No     Fall Risk " Screen:    BRADLY Fall Risk Assessment was completed, and patient is at LOW risk for falls.Assessment completed on:2023    Depression Screenin/22/2023    11:16 AM   PHQ-2/PHQ-9 Depression Screening   Little Interest or Pleasure in Doing Things 0-->not at all   Feeling Down, Depressed or Hopeless 0-->not at all   PHQ-9: Brief Depression Severity Measure Score 0       Health Habits and Functional and Cognitive Screenin/25/2023    10:58 AM   Functional & Cognitive Status   Do you have difficulty preparing food and eating? No   Do you have difficulty bathing yourself, getting dressed or grooming yourself? No   Do you have difficulty using the toilet? No   Do you have difficulty moving around from place to place? No   Do you have trouble with steps or getting out of a bed or a chair? No   Current Diet Well Balanced Diet   Dental Exam Up to date   Eye Exam Up to date   Exercise (times per week) 0 times per week   Current Exercises Include No Regular Exercise   Do you need help using the phone?  No   Are you deaf or do you have serious difficulty hearing?  No   Do you need help to go to places out of walking distance? No   Do you need help shopping? No   Do you need help preparing meals?  No   Do you need help with housework?  No   Do you need help with laundry? No   Do you need help taking your medications? No   Do you need help managing money? No   Do you ever drive or ride in a car without wearing a seat belt? No   Have you felt unusual stress, anger or loneliness in the last month? No   Who do you live with? Spouse   If you need help, do you have trouble finding someone available to you? No   Have you been bothered in the last four weeks by sexual problems? No   Do you have difficulty concentrating, remembering or making decisions? No       Age-appropriate Screening Schedule:  Refer to the list below for future screening recommendations based on patient's age, sex and/or medical conditions.  Orders for these recommended tests are listed in the plan section. The patient has been provided with a written plan.    Health Maintenance   Topic Date Due    TDAP/TD VACCINES (1 - Tdap) Never done    ZOSTER VACCINE (1 of 2) Never done    DXA SCAN  10/06/2017    COVID-19 Vaccine (3 - Pfizer series) 04/03/2021    ANNUAL WELLNESS VISIT  11/12/2022    INFLUENZA VACCINE  10/01/2023    COLORECTAL CANCER SCREENING  11/26/2023    LIPID PANEL  06/22/2024    HEPATITIS C SCREENING  Completed    Pneumococcal Vaccine 65+  Completed                  CMS Preventative Services Quick Reference  Risk Factors Identified During Encounter:    None Identified  Inadequate Social Support, Isolation, Loneliness, Lack of Transportation, Financial Difficulties, or Caregiver Stress: Relies on neighbors.     The above risks/problems have been discussed with the patient.  Pertinent information has been shared with the patient in the After Visit Summary.    Diagnoses and all orders for this visit:    1. Well woman exam (no gynecological exam) (Primary)    2. Medicare annual wellness visit, subsequent    3. Healthcare maintenance  -     CBC & Differential  -     Comprehensive Metabolic Panel  -     Hemoglobin A1c  -     Thyroid Panel With TSH  -     Lipid Panel With LDL / HDL Ratio        Follow Up:   Next Medicare Wellness visit to be scheduled in 1 year.      An After Visit Summary and PPPS were made available to the patient.

## 2023-09-28 NOTE — PROGRESS NOTES
Please inform the patient of the following abnormal results. Elevated serum creatnine levels, have patient drink plenty of water. Will recheck at next visit.

## 2023-10-10 ENCOUNTER — HOSPITAL ENCOUNTER (OUTPATIENT)
Facility: HOSPITAL | Age: 78
Discharge: HOME | End: 2023-10-10
Admitting: FAMILY MEDICINE
Payer: MEDICARE

## 2023-10-10 PROCEDURE — 77080 DXA BONE DENSITY AXIAL: CPT

## 2023-11-07 ENCOUNTER — HOSPITAL ENCOUNTER (OUTPATIENT)
Dept: GENERAL RADIOLOGY | Facility: HOSPITAL | Age: 78
Discharge: HOME | End: 2023-11-07
Payer: MEDICARE

## 2023-11-07 ENCOUNTER — PRE-ADMISSION TESTING (OUTPATIENT)
Dept: PREADMISSION TESTING | Facility: HOSPITAL | Age: 78
End: 2023-11-07
Payer: MEDICARE

## 2023-11-07 VITALS
OXYGEN SATURATION: 99 % | SYSTOLIC BLOOD PRESSURE: 194 MMHG | TEMPERATURE: 97.2 F | RESPIRATION RATE: 16 BRPM | BODY MASS INDEX: 23.17 KG/M2 | DIASTOLIC BLOOD PRESSURE: 80 MMHG | WEIGHT: 122.7 LBS | HEIGHT: 61 IN | HEART RATE: 68 BPM

## 2023-11-07 LAB
ALBUMIN SERPL-MCNC: 4.5 G/DL (ref 3.5–5.2)
ALBUMIN/GLOB SERPL: 1.6 G/DL
ALP SERPL-CCNC: 98 U/L (ref 39–117)
ALT SERPL W P-5'-P-CCNC: 17 U/L (ref 1–33)
ANION GAP SERPL CALCULATED.3IONS-SCNC: 12.3 MMOL/L (ref 5–15)
AST SERPL-CCNC: 21 U/L (ref 1–32)
BILIRUB SERPL-MCNC: 0.2 MG/DL (ref 0–1.2)
BILIRUB UR QL STRIP: NEGATIVE
BUN SERPL-MCNC: 24 MG/DL (ref 8–23)
BUN/CREAT SERPL: 18.3 (ref 7–25)
CALCIUM SPEC-SCNC: 10.2 MG/DL (ref 8.6–10.5)
CHLORIDE SERPL-SCNC: 102 MMOL/L (ref 98–107)
CLARITY UR: CLEAR
CO2 SERPL-SCNC: 27.7 MMOL/L (ref 22–29)
COLOR UR: YELLOW
CREAT SERPL-MCNC: 1.31 MG/DL (ref 0.57–1)
DEPRECATED RDW RBC AUTO: 39.3 FL (ref 37–54)
EGFRCR SERPLBLD CKD-EPI 2021: 42.1 ML/MIN/1.73
ERYTHROCYTE [DISTWIDTH] IN BLOOD BY AUTOMATED COUNT: 12.2 % (ref 12.3–15.4)
GLOBULIN UR ELPH-MCNC: 2.8 GM/DL
GLUCOSE SERPL-MCNC: 84 MG/DL (ref 65–99)
GLUCOSE UR STRIP-MCNC: NEGATIVE MG/DL
HBA1C MFR BLD: 5.6 % (ref 4.8–5.6)
HCT VFR BLD AUTO: 33.2 % (ref 34–46.6)
HGB BLD-MCNC: 11.1 G/DL (ref 12–15.9)
HGB UR QL STRIP.AUTO: NEGATIVE
INR PPP: 1.05 (ref 0.9–1.1)
KETONES UR QL STRIP: NEGATIVE
LEUKOCYTE ESTERASE UR QL STRIP.AUTO: NEGATIVE
MCH RBC QN AUTO: 29.3 PG (ref 26.6–33)
MCHC RBC AUTO-ENTMCNC: 33.4 G/DL (ref 31.5–35.7)
MCV RBC AUTO: 87.6 FL (ref 79–97)
NITRITE UR QL STRIP: NEGATIVE
PH UR STRIP.AUTO: 5.5 [PH] (ref 5–8)
PLATELET # BLD AUTO: 364 10*3/MM3 (ref 140–450)
PMV BLD AUTO: 9.9 FL (ref 6–12)
POTASSIUM SERPL-SCNC: 3.7 MMOL/L (ref 3.5–5.2)
PROT SERPL-MCNC: 7.3 G/DL (ref 6–8.5)
PROT UR QL STRIP: NEGATIVE
PROTHROMBIN TIME: 13.9 SECONDS (ref 11.7–14.2)
QT INTERVAL: 407 MS
QTC INTERVAL: 400 MS
RBC # BLD AUTO: 3.79 10*6/MM3 (ref 3.77–5.28)
SODIUM SERPL-SCNC: 142 MMOL/L (ref 136–145)
SP GR UR STRIP: 1.01 (ref 1–1.03)
UROBILINOGEN UR QL STRIP: NORMAL
WBC NRBC COR # BLD: 6.6 10*3/MM3 (ref 3.4–10.8)

## 2023-11-07 PROCEDURE — 80053 COMPREHEN METABOLIC PANEL: CPT

## 2023-11-07 PROCEDURE — 71046 X-RAY EXAM CHEST 2 VIEWS: CPT

## 2023-11-07 PROCEDURE — 81003 URINALYSIS AUTO W/O SCOPE: CPT

## 2023-11-07 PROCEDURE — 85027 COMPLETE CBC AUTOMATED: CPT

## 2023-11-07 PROCEDURE — 85610 PROTHROMBIN TIME: CPT

## 2023-11-07 PROCEDURE — 73560 X-RAY EXAM OF KNEE 1 OR 2: CPT

## 2023-11-07 PROCEDURE — 93005 ELECTROCARDIOGRAM TRACING: CPT

## 2023-11-07 PROCEDURE — 36415 COLL VENOUS BLD VENIPUNCTURE: CPT

## 2023-11-07 PROCEDURE — 83036 HEMOGLOBIN GLYCOSYLATED A1C: CPT

## 2023-11-07 RX ORDER — PRAVASTATIN SODIUM 40 MG
40 TABLET ORAL NIGHTLY
COMMUNITY

## 2023-11-07 RX ORDER — MELATONIN
1000 DAILY
COMMUNITY

## 2023-11-07 RX ORDER — DIPHENHYDRAMINE HCL 25 MG
25 CAPSULE ORAL EVERY 6 HOURS PRN
COMMUNITY

## 2023-11-07 NOTE — DISCHARGE INSTRUCTIONS
Take the following medications the morning of surgery: METOPROLOL    ARRIVAL TIME TO BE DETERMINED. YOU WILL BE CALLED ONE OR TWO DAYS PRIOR TO SURGERY      If you are on prescription narcotic pain medication to control your pain you may also take that medication the morning of surgery.    General Instructions:  Do not eat solid food after midnight the night before surgery.  You may drink clear liquids day of surgery but must stop at least one hour before your hospital arrival time.  It is beneficial for you to have a clear drink that contains carbohydrates the day of surgery.  We suggest a 12 to 20 ounce bottle of Gatorade or Powerade for non-diabetic patients or a 12 to 20 ounce bottle of G2 or Powerade Zero for diabetic patients. (Pediatric patients, are not advised to drink a 12 to 20 ounce carbohydrate drink)    Clear liquids are liquids you can see through.  Nothing red in color.     Plain water                               Sports drinks  Sodas                                   Gelatin (Jell-O)  Fruit juices without pulp such as white grape juice and apple juice  Popsicles that contain no fruit or yogurt  Tea or coffee (no cream or milk added)  Gatorade / Powerade  G2 / Powerade Zero      Patients who avoid smoking, chewing tobacco and alcohol for 4 weeks prior to surgery have a reduced risk of post-operative complications.  Quit smoking as many days before surgery as you can.  Do not smoke, use chewing tobacco or drink alcohol the day of surgery.   If applicable bring your C-PAP/ BI-PAP machine in with you to preop day of surgery.  Bring any papers given to you in the doctor’s office.  Wear clean comfortable clothes.  Do not wear contact lenses, false eyelashes or make-up.  Bring a case for your glasses.   Bring crutches or walker if applicable.  Remove all piercings.  Leave jewelry and any other valuables at home.  Hair extensions with metal clips must be removed prior to surgery.  The Pre-Admission Testing  nurse will instruct you to bring medications if unable to obtain an accurate list in Pre-Admission Testing.          Preventing a Surgical Site Infection:  For 2 to 3 days before surgery, avoid shaving with a razor because the razor can irritate skin and make it easier to develop an infection.    Any areas of open skin can increase the risk of a post-operative wound infection by allowing bacteria to enter and travel throughout the body.  Notify your surgeon if you have any skin wounds / rashes even if it is not near the expected surgical site.  The area will need assessed to determine if surgery should be delayed until it is healed.  The night prior to surgery shower using a fresh bar of anti-bacterial soap (such as Dial) and clean washcloth.  Sleep in a clean bed with clean clothing.  Do not allow pets to sleep with you.  Shower on the morning of surgery using a fresh bar of anti-bacterial soap (such as Dial) and clean washcloth.  Dry with a clean towel and dress in clean clothing.  Ask your surgeon if you will be receiving antibiotics prior to surgery.  Make sure you, your family, and all healthcare providers clean their hands with soap and water or an alcohol based hand  before caring for you or your wound.      CHLORHEXIDINE CLOTH INSTRUCTIONS  The morning of surgery follow these instructions using the Chlorhexidine cloths you've been given.  These steps reduce bacteria on the body.  Do not use the cloths near your eyes, ears mouth, genitalia or on open wounds.  Throw the cloths away after use but do not try to flush them down a toilet.      Open and remove one cloth at a time from the package.    Leave the cloth unfolded and begin the bathing.  Massage the skin with the cloths using gentle pressure to remove bacteria.  Do not scrub harshly.   Follow the steps below with one 2% CHG cloth per area (6 total cloths).  One cloth for neck, shoulders and chest.  One cloth for both arms, hands, fingers and  underarms (do underarms last).  One cloth for the abdomen followed by groin.  One cloth for right leg and foot including between the toes.  One cloth for left leg and foot including between the toes.  The last cloth is to be used for the back of the neck, back and buttocks.    Allow the CHG to air dry 3 minutes on the skin which will give it time to work and decrease the chance of irritation.  The skin may feel sticky until it is dry.  Do not rinse with water or any other liquid or you will lose the beneficial effects of the CHG.  If mild skin irritation occurs, do rinse the skin to remove the CHG.  Report this to the nurse at time of admission.  Do not apply lotions, creams, ointments, deodorants or perfumes after using the clothes. Dress in clean clothes before coming to the hospital.      Day of surgery:  Your arrival time is approximately two hours before your scheduled surgery time.  Upon arrival, a Pre-op nurse and Anesthesiologist will review your health history, obtain vital signs, and answer questions you may have.  The only belongings needed at this time will be a list of your home medications and if applicable your C-PAP/BI-PAP machine.  A Pre-op nurse will start an IV and you may receive medication in preparation for surgery, including something to help you relax.     Please be aware that surgery does come with discomfort.  We want to make every effort to control your discomfort so please discuss any uncontrolled symptoms with your nurse.   Your doctor will most likely have prescribed pain medications.      If you are going home after surgery you will receive individualized written care instructions before being discharged.  A responsible adult must drive you to and from the hospital on the day of your surgery and stay with you for 24 hours.  Discharge prescriptions can be filled by the hospital pharmacy during regular pharmacy hours.  If you are having surgery late in the day/evening your prescription  may be e-prescribed to your pharmacy.  Please verify your pharmacy hours or chose a 24 hour pharmacy to avoid not having access to your prescription because your pharmacy has closed for the day.    If you are staying overnight following surgery, you will be transported to your hospital room following the recovery period.  [facility] has all private rooms.    If you have any questions please call Pre-Admission Testing at [phone].  Deductibles and co-payments are collected on the day of service. Please be prepared to pay the required co-pay, deductible or deposit on the day of service as defined by your plan.    Call your surgeon immediately if you experience any of the following symptoms:  Sore Throat  Shortness of Breath or difficulty breathing  Cough  Chills  Body soreness or muscle pain  Headache  Fever  New loss of taste or smell  Do not arrive for your surgery ill.  Your procedure will need to be rescheduled to another time.  You will need to call your physician before the day of surgery to avoid any unnecessary exposure to hospital staff as well as other patients.

## 2023-11-21 ENCOUNTER — HOSPITAL ENCOUNTER (OUTPATIENT)
Facility: HOSPITAL | Age: 78
Setting detail: OBSERVATION
Discharge: HOME | End: 2023-11-25
Attending: ORTHOPAEDIC SURGERY | Admitting: ORTHOPAEDIC SURGERY
Payer: MEDICARE

## 2023-11-21 ENCOUNTER — APPOINTMENT (OUTPATIENT)
Dept: GENERAL RADIOLOGY | Facility: HOSPITAL | Age: 78
End: 2023-11-21
Payer: MEDICARE

## 2023-11-21 ENCOUNTER — ANESTHESIA (OUTPATIENT)
Dept: PERIOP | Facility: HOSPITAL | Age: 78
End: 2023-11-21
Payer: MEDICARE

## 2023-11-21 ENCOUNTER — ANESTHESIA EVENT (OUTPATIENT)
Dept: PERIOP | Facility: HOSPITAL | Age: 78
End: 2023-11-21
Payer: MEDICARE

## 2023-11-21 DIAGNOSIS — M17.12 PRIMARY OSTEOARTHRITIS OF LEFT KNEE: Primary | ICD-10-CM

## 2023-11-21 PROCEDURE — 25010000002 SUGAMMADEX 200 MG/2ML SOLUTION: Performed by: NURSE ANESTHETIST, CERTIFIED REGISTERED

## 2023-11-21 PROCEDURE — 25010000002 HYDRALAZINE PER 20 MG

## 2023-11-21 PROCEDURE — 25010000002 ROPIVACAINE PER 1 MG: Performed by: STUDENT IN AN ORGANIZED HEALTH CARE EDUCATION/TRAINING PROGRAM

## 2023-11-21 PROCEDURE — G0378 HOSPITAL OBSERVATION PER HR: HCPCS

## 2023-11-21 PROCEDURE — 25010000002 FENTANYL CITRATE (PF) 100 MCG/2ML SOLUTION

## 2023-11-21 PROCEDURE — C1776 JOINT DEVICE (IMPLANTABLE): HCPCS | Performed by: ORTHOPAEDIC SURGERY

## 2023-11-21 PROCEDURE — 25010000002 ROPIVACAINE PER 1 MG: Performed by: ORTHOPAEDIC SURGERY

## 2023-11-21 PROCEDURE — 25010000002 CEFAZOLIN PER 500 MG: Performed by: NURSE ANESTHETIST, CERTIFIED REGISTERED

## 2023-11-21 PROCEDURE — 25810000003 LACTATED RINGERS PER 1000 ML: Performed by: STUDENT IN AN ORGANIZED HEALTH CARE EDUCATION/TRAINING PROGRAM

## 2023-11-21 PROCEDURE — 25810000003 LACTATED RINGERS PER 1000 ML: Performed by: ORTHOPAEDIC SURGERY

## 2023-11-21 PROCEDURE — 25010000002 DEXAMETHASONE SODIUM PHOSPHATE 20 MG/5ML SOLUTION

## 2023-11-21 PROCEDURE — 25010000002 PROPOFOL 200 MG/20ML EMULSION

## 2023-11-21 PROCEDURE — 25010000002 KETOROLAC TROMETHAMINE PER 15 MG: Performed by: ORTHOPAEDIC SURGERY

## 2023-11-21 PROCEDURE — 25010000002 ONDANSETRON PER 1 MG

## 2023-11-21 PROCEDURE — 63710000001 PRAVASTATIN 40 MG TABLET: Performed by: ORTHOPAEDIC SURGERY

## 2023-11-21 PROCEDURE — C1713 ANCHOR/SCREW BN/BN,TIS/BN: HCPCS | Performed by: ORTHOPAEDIC SURGERY

## 2023-11-21 PROCEDURE — 25010000002 CEFAZOLIN IN DEXTROSE 2-4 GM/100ML-% SOLUTION: Performed by: ORTHOPAEDIC SURGERY

## 2023-11-21 PROCEDURE — 25010000002 EPINEPHRINE 1 MG/ML SOLUTION 30 ML VIAL: Performed by: ORTHOPAEDIC SURGERY

## 2023-11-21 PROCEDURE — A9270 NON-COVERED ITEM OR SERVICE: HCPCS | Performed by: ORTHOPAEDIC SURGERY

## 2023-11-21 PROCEDURE — 25010000002 MORPHINE PER 10 MG: Performed by: ORTHOPAEDIC SURGERY

## 2023-11-21 PROCEDURE — 63710000001 POVIDONE-IODINE 10 % SOLUTION 14.8 ML BOTTLE: Performed by: ORTHOPAEDIC SURGERY

## 2023-11-21 PROCEDURE — 25010000002 CLINDAMYCIN 900 MG/50ML SOLUTION: Performed by: ORTHOPAEDIC SURGERY

## 2023-11-21 PROCEDURE — 25010000002 FENTANYL CITRATE (PF) 50 MCG/ML SOLUTION

## 2023-11-21 PROCEDURE — 63710000001 OXYCODONE-ACETAMINOPHEN 5-325 MG TABLET: Performed by: ORTHOPAEDIC SURGERY

## 2023-11-21 PROCEDURE — 73560 X-RAY EXAM OF KNEE 1 OR 2: CPT

## 2023-11-21 PROCEDURE — 25010000002 MAGNESIUM SULFATE PER 500 MG OF MAGNESIUM

## 2023-11-21 PROCEDURE — 25010000002 FENTANYL CITRATE (PF) 50 MCG/ML SOLUTION: Performed by: STUDENT IN AN ORGANIZED HEALTH CARE EDUCATION/TRAINING PROGRAM

## 2023-11-21 DEVICE — PAT 3PEG THN 34X7.8 34MM: Type: IMPLANTABLE DEVICE | Site: KNEE | Status: FUNCTIONAL

## 2023-11-21 DEVICE — CMT BONE R 1X40: Type: IMPLANTABLE DEVICE | Site: KNEE | Status: FUNCTIONAL

## 2023-11-21 DEVICE — BEAR TIB/KN VANGUARD CR DCM 12X71/75MM NS: Type: IMPLANTABLE DEVICE | Site: KNEE | Status: FUNCTIONAL

## 2023-11-21 DEVICE — TRY TIB INTERLOK PRI 71MM: Type: IMPLANTABLE DEVICE | Site: KNEE | Status: FUNCTIONAL

## 2023-11-21 DEVICE — CAP TOTL KN CMT PRIMARY: Type: IMPLANTABLE DEVICE | Status: FUNCTIONAL

## 2023-11-21 DEVICE — STEM TIB PRI FINN 46X40MM: Type: IMPLANTABLE DEVICE | Site: KNEE | Status: FUNCTIONAL

## 2023-11-21 DEVICE — COMP FEM/KN VANGUARD INTLK CR 67.5MM NS LT: Type: IMPLANTABLE DEVICE | Site: KNEE | Status: FUNCTIONAL

## 2023-11-21 RX ORDER — ASPIRIN 325 MG
325 TABLET, DELAYED RELEASE (ENTERIC COATED) ORAL DAILY
Status: DISCONTINUED | OUTPATIENT
Start: 2023-11-22 | End: 2023-11-23

## 2023-11-21 RX ORDER — SODIUM CHLORIDE 0.9 % (FLUSH) 0.9 %
10 SYRINGE (ML) INJECTION EVERY 12 HOURS SCHEDULED
Status: DISCONTINUED | OUTPATIENT
Start: 2023-11-21 | End: 2023-11-25 | Stop reason: HOSPADM

## 2023-11-21 RX ORDER — ACETAMINOPHEN 325 MG/1
325 TABLET ORAL EVERY 4 HOURS PRN
Status: DISCONTINUED | OUTPATIENT
Start: 2023-11-21 | End: 2023-11-23

## 2023-11-21 RX ORDER — PROMETHAZINE HYDROCHLORIDE 25 MG/1
25 TABLET ORAL ONCE AS NEEDED
Status: DISCONTINUED | OUTPATIENT
Start: 2023-11-21 | End: 2023-11-21 | Stop reason: HOSPADM

## 2023-11-21 RX ORDER — MAGNESIUM HYDROXIDE 1200 MG/15ML
LIQUID ORAL AS NEEDED
Status: DISCONTINUED | OUTPATIENT
Start: 2023-11-21 | End: 2023-11-21 | Stop reason: HOSPADM

## 2023-11-21 RX ORDER — CELECOXIB 200 MG/1
200 CAPSULE ORAL ONCE
Status: COMPLETED | OUTPATIENT
Start: 2023-11-21 | End: 2023-11-21

## 2023-11-21 RX ORDER — HYDROMORPHONE HYDROCHLORIDE 1 MG/ML
0.25 INJECTION, SOLUTION INTRAMUSCULAR; INTRAVENOUS; SUBCUTANEOUS
Status: DISCONTINUED | OUTPATIENT
Start: 2023-11-21 | End: 2023-11-21 | Stop reason: HOSPADM

## 2023-11-21 RX ORDER — DROPERIDOL 2.5 MG/ML
0.62 INJECTION, SOLUTION INTRAMUSCULAR; INTRAVENOUS
Status: DISCONTINUED | OUTPATIENT
Start: 2023-11-21 | End: 2023-11-21 | Stop reason: HOSPADM

## 2023-11-21 RX ORDER — PROPOFOL 10 MG/ML
INJECTION, EMULSION INTRAVENOUS AS NEEDED
Status: DISCONTINUED | OUTPATIENT
Start: 2023-11-21 | End: 2023-11-21 | Stop reason: SURG

## 2023-11-21 RX ORDER — FLUMAZENIL 0.1 MG/ML
0.2 INJECTION INTRAVENOUS AS NEEDED
Status: DISCONTINUED | OUTPATIENT
Start: 2023-11-21 | End: 2023-11-21 | Stop reason: HOSPADM

## 2023-11-21 RX ORDER — DEXAMETHASONE SODIUM PHOSPHATE 4 MG/ML
INJECTION, SOLUTION INTRA-ARTICULAR; INTRALESIONAL; INTRAMUSCULAR; INTRAVENOUS; SOFT TISSUE AS NEEDED
Status: DISCONTINUED | OUTPATIENT
Start: 2023-11-21 | End: 2023-11-21 | Stop reason: SURG

## 2023-11-21 RX ORDER — MAGNESIUM SULFATE HEPTAHYDRATE 500 MG/ML
INJECTION, SOLUTION INTRAMUSCULAR; INTRAVENOUS AS NEEDED
Status: DISCONTINUED | OUTPATIENT
Start: 2023-11-21 | End: 2023-11-21 | Stop reason: SURG

## 2023-11-21 RX ORDER — LIDOCAINE HYDROCHLORIDE 10 MG/ML
0.5 INJECTION, SOLUTION INFILTRATION; PERINEURAL ONCE AS NEEDED
Status: DISCONTINUED | OUTPATIENT
Start: 2023-11-21 | End: 2023-11-21 | Stop reason: HOSPADM

## 2023-11-21 RX ORDER — AMITRIPTYLINE HYDROCHLORIDE 10 MG/1
10 TABLET, FILM COATED ORAL NIGHTLY PRN
Status: DISCONTINUED | OUTPATIENT
Start: 2023-11-21 | End: 2023-11-25 | Stop reason: HOSPADM

## 2023-11-21 RX ORDER — ONDANSETRON 2 MG/ML
4 INJECTION INTRAMUSCULAR; INTRAVENOUS EVERY 6 HOURS PRN
Status: DISCONTINUED | OUTPATIENT
Start: 2023-11-21 | End: 2023-11-25 | Stop reason: HOSPADM

## 2023-11-21 RX ORDER — LABETALOL HYDROCHLORIDE 5 MG/ML
5 INJECTION, SOLUTION INTRAVENOUS
Status: DISCONTINUED | OUTPATIENT
Start: 2023-11-21 | End: 2023-11-21 | Stop reason: HOSPADM

## 2023-11-21 RX ORDER — ONDANSETRON 2 MG/ML
4 INJECTION INTRAMUSCULAR; INTRAVENOUS ONCE AS NEEDED
Status: DISCONTINUED | OUTPATIENT
Start: 2023-11-21 | End: 2023-11-21 | Stop reason: HOSPADM

## 2023-11-21 RX ORDER — HYDRALAZINE HYDROCHLORIDE 20 MG/ML
INJECTION INTRAMUSCULAR; INTRAVENOUS AS NEEDED
Status: DISCONTINUED | OUTPATIENT
Start: 2023-11-21 | End: 2023-11-21 | Stop reason: SURG

## 2023-11-21 RX ORDER — ROPIVACAINE HYDROCHLORIDE 5 MG/ML
INJECTION, SOLUTION EPIDURAL; INFILTRATION; PERINEURAL
Status: COMPLETED | OUTPATIENT
Start: 2023-11-21 | End: 2023-11-21

## 2023-11-21 RX ORDER — ACETAMINOPHEN 500 MG
1000 TABLET ORAL ONCE
Status: COMPLETED | OUTPATIENT
Start: 2023-11-21 | End: 2023-11-21

## 2023-11-21 RX ORDER — ASPIRIN 325 MG
325 TABLET, DELAYED RELEASE (ENTERIC COATED) ORAL DAILY
Qty: 42 TABLET | Refills: 0 | Status: SHIPPED | OUTPATIENT
Start: 2023-11-22 | End: 2023-11-25 | Stop reason: HOSPADM

## 2023-11-21 RX ORDER — NALOXONE HCL 0.4 MG/ML
0.4 VIAL (ML) INJECTION
Status: DISCONTINUED | OUTPATIENT
Start: 2023-11-21 | End: 2023-11-25 | Stop reason: HOSPADM

## 2023-11-21 RX ORDER — PROMETHAZINE HYDROCHLORIDE 25 MG/1
25 SUPPOSITORY RECTAL ONCE AS NEEDED
Status: DISCONTINUED | OUTPATIENT
Start: 2023-11-21 | End: 2023-11-21 | Stop reason: HOSPADM

## 2023-11-21 RX ORDER — TRANEXAMIC ACID 100 MG/ML
INJECTION, SOLUTION INTRAVENOUS AS NEEDED
Status: DISCONTINUED | OUTPATIENT
Start: 2023-11-21 | End: 2023-11-21 | Stop reason: SURG

## 2023-11-21 RX ORDER — SODIUM CHLORIDE 0.9 % (FLUSH) 0.9 %
3-10 SYRINGE (ML) INJECTION AS NEEDED
Status: DISCONTINUED | OUTPATIENT
Start: 2023-11-21 | End: 2023-11-21 | Stop reason: HOSPADM

## 2023-11-21 RX ORDER — ONDANSETRON 2 MG/ML
INJECTION INTRAMUSCULAR; INTRAVENOUS AS NEEDED
Status: DISCONTINUED | OUTPATIENT
Start: 2023-11-21 | End: 2023-11-21 | Stop reason: SURG

## 2023-11-21 RX ORDER — FENTANYL CITRATE 50 UG/ML
25 INJECTION, SOLUTION INTRAMUSCULAR; INTRAVENOUS
Status: DISCONTINUED | OUTPATIENT
Start: 2023-11-21 | End: 2023-11-21 | Stop reason: HOSPADM

## 2023-11-21 RX ORDER — DOCUSATE SODIUM 100 MG/1
100 CAPSULE, LIQUID FILLED ORAL 2 TIMES DAILY PRN
Status: DISCONTINUED | OUTPATIENT
Start: 2023-11-21 | End: 2023-11-25 | Stop reason: HOSPADM

## 2023-11-21 RX ORDER — DIPHENHYDRAMINE HCL 25 MG
25 CAPSULE ORAL EVERY 6 HOURS PRN
Status: DISCONTINUED | OUTPATIENT
Start: 2023-11-21 | End: 2023-11-23

## 2023-11-21 RX ORDER — ROCURONIUM BROMIDE 10 MG/ML
INJECTION, SOLUTION INTRAVENOUS AS NEEDED
Status: DISCONTINUED | OUTPATIENT
Start: 2023-11-21 | End: 2023-11-21 | Stop reason: SURG

## 2023-11-21 RX ORDER — DOCUSATE SODIUM 250 MG
250 CAPSULE ORAL 2 TIMES DAILY PRN
Qty: 30 CAPSULE | Refills: 1 | Status: SHIPPED | OUTPATIENT
Start: 2023-11-21

## 2023-11-21 RX ORDER — PROMETHAZINE HYDROCHLORIDE 12.5 MG/1
12.5 TABLET ORAL EVERY 6 HOURS PRN
Status: DISCONTINUED | OUTPATIENT
Start: 2023-11-21 | End: 2023-11-25 | Stop reason: HOSPADM

## 2023-11-21 RX ORDER — METOPROLOL SUCCINATE 50 MG/1
50 TABLET, EXTENDED RELEASE ORAL DAILY
Status: DISCONTINUED | OUTPATIENT
Start: 2023-11-22 | End: 2023-11-23

## 2023-11-21 RX ORDER — SODIUM CHLORIDE 0.9 % (FLUSH) 0.9 %
1-10 SYRINGE (ML) INJECTION AS NEEDED
Status: DISCONTINUED | OUTPATIENT
Start: 2023-11-21 | End: 2023-11-25 | Stop reason: HOSPADM

## 2023-11-21 RX ORDER — PRAVASTATIN SODIUM 40 MG
40 TABLET ORAL NIGHTLY
Status: DISCONTINUED | OUTPATIENT
Start: 2023-11-21 | End: 2023-11-25 | Stop reason: HOSPADM

## 2023-11-21 RX ORDER — HYDROCODONE BITARTRATE AND ACETAMINOPHEN 5; 325 MG/1; MG/1
1 TABLET ORAL ONCE AS NEEDED
Status: DISCONTINUED | OUTPATIENT
Start: 2023-11-21 | End: 2023-11-21 | Stop reason: HOSPADM

## 2023-11-21 RX ORDER — SODIUM CHLORIDE 9 MG/ML
40 INJECTION, SOLUTION INTRAVENOUS AS NEEDED
Status: DISCONTINUED | OUTPATIENT
Start: 2023-11-21 | End: 2023-11-25 | Stop reason: HOSPADM

## 2023-11-21 RX ORDER — CHOLECALCIFEROL (VITAMIN D3) 125 MCG
5 CAPSULE ORAL NIGHTLY PRN
Status: DISCONTINUED | OUTPATIENT
Start: 2023-11-21 | End: 2023-11-25 | Stop reason: HOSPADM

## 2023-11-21 RX ORDER — DIPHENHYDRAMINE HYDROCHLORIDE 50 MG/ML
12.5 INJECTION INTRAMUSCULAR; INTRAVENOUS
Status: DISCONTINUED | OUTPATIENT
Start: 2023-11-21 | End: 2023-11-21 | Stop reason: HOSPADM

## 2023-11-21 RX ORDER — FENTANYL CITRATE 50 UG/ML
INJECTION, SOLUTION INTRAMUSCULAR; INTRAVENOUS AS NEEDED
Status: DISCONTINUED | OUTPATIENT
Start: 2023-11-21 | End: 2023-11-21 | Stop reason: SURG

## 2023-11-21 RX ORDER — DIPHENHYDRAMINE HYDROCHLORIDE 50 MG/ML
12.5 INJECTION INTRAMUSCULAR; INTRAVENOUS EVERY 6 HOURS PRN
Status: DISCONTINUED | OUTPATIENT
Start: 2023-11-21 | End: 2023-11-23

## 2023-11-21 RX ORDER — SODIUM CHLORIDE 450 MG/100ML
100 INJECTION, SOLUTION INTRAVENOUS CONTINUOUS
Status: DISCONTINUED | OUTPATIENT
Start: 2023-11-21 | End: 2023-11-23

## 2023-11-21 RX ORDER — OXYCODONE HYDROCHLORIDE AND ACETAMINOPHEN 5; 325 MG/1; MG/1
2 TABLET ORAL EVERY 4 HOURS PRN
Status: DISCONTINUED | OUTPATIENT
Start: 2023-11-21 | End: 2023-11-25 | Stop reason: HOSPADM

## 2023-11-21 RX ORDER — NALOXONE HCL 0.4 MG/ML
0.2 VIAL (ML) INJECTION AS NEEDED
Status: DISCONTINUED | OUTPATIENT
Start: 2023-11-21 | End: 2023-11-21 | Stop reason: HOSPADM

## 2023-11-21 RX ORDER — EPHEDRINE SULFATE 50 MG/ML
5 INJECTION, SOLUTION INTRAVENOUS ONCE AS NEEDED
Status: DISCONTINUED | OUTPATIENT
Start: 2023-11-21 | End: 2023-11-21 | Stop reason: HOSPADM

## 2023-11-21 RX ORDER — MORPHINE SULFATE 2 MG/ML
4 INJECTION, SOLUTION INTRAMUSCULAR; INTRAVENOUS
Status: ACTIVE | OUTPATIENT
Start: 2023-11-21 | End: 2023-11-24

## 2023-11-21 RX ORDER — OXYCODONE HYDROCHLORIDE AND ACETAMINOPHEN 5; 325 MG/1; MG/1
1 TABLET ORAL EVERY 4 HOURS PRN
Status: DISCONTINUED | OUTPATIENT
Start: 2023-11-21 | End: 2023-11-25 | Stop reason: HOSPADM

## 2023-11-21 RX ORDER — FENTANYL CITRATE 50 UG/ML
50 INJECTION, SOLUTION INTRAMUSCULAR; INTRAVENOUS ONCE AS NEEDED
Status: COMPLETED | OUTPATIENT
Start: 2023-11-21 | End: 2023-11-21

## 2023-11-21 RX ORDER — CLINDAMYCIN PHOSPHATE 900 MG/50ML
900 INJECTION INTRAVENOUS EVERY 8 HOURS
Qty: 100 ML | Refills: 0 | Status: COMPLETED | OUTPATIENT
Start: 2023-11-21 | End: 2023-11-22

## 2023-11-21 RX ORDER — SODIUM CHLORIDE 0.9 % (FLUSH) 0.9 %
3 SYRINGE (ML) INJECTION EVERY 12 HOURS SCHEDULED
Status: DISCONTINUED | OUTPATIENT
Start: 2023-11-21 | End: 2023-11-21 | Stop reason: HOSPADM

## 2023-11-21 RX ORDER — KETOROLAC TROMETHAMINE 15 MG/ML
15 INJECTION, SOLUTION INTRAMUSCULAR; INTRAVENOUS EVERY 6 HOURS PRN
Status: DISCONTINUED | OUTPATIENT
Start: 2023-11-21 | End: 2023-11-23

## 2023-11-21 RX ORDER — DIAZEPAM 5 MG/1
5 TABLET ORAL 2 TIMES DAILY PRN
Status: ACTIVE | OUTPATIENT
Start: 2023-11-21 | End: 2023-11-25

## 2023-11-21 RX ORDER — CEFAZOLIN SODIUM 2 G/100ML
2000 INJECTION, SOLUTION INTRAVENOUS ONCE
Status: COMPLETED | OUTPATIENT
Start: 2023-11-21 | End: 2023-11-21

## 2023-11-21 RX ORDER — CEFAZOLIN SODIUM 500 MG/2.2ML
INJECTION, POWDER, FOR SOLUTION INTRAMUSCULAR; INTRAVENOUS AS NEEDED
Status: DISCONTINUED | OUTPATIENT
Start: 2023-11-21 | End: 2023-11-21 | Stop reason: SURG

## 2023-11-21 RX ORDER — ACETAMINOPHEN 325 MG/1
650 TABLET ORAL DAILY PRN
Status: DISCONTINUED | OUTPATIENT
Start: 2023-11-21 | End: 2023-11-23

## 2023-11-21 RX ORDER — HYDROCODONE BITARTRATE AND ACETAMINOPHEN 7.5; 325 MG/1; MG/1
1 TABLET ORAL EVERY 4 HOURS PRN
Status: DISCONTINUED | OUTPATIENT
Start: 2023-11-21 | End: 2023-11-21 | Stop reason: HOSPADM

## 2023-11-21 RX ORDER — ONDANSETRON 4 MG/1
4 TABLET, FILM COATED ORAL EVERY 6 HOURS PRN
Status: DISCONTINUED | OUTPATIENT
Start: 2023-11-21 | End: 2023-11-25 | Stop reason: HOSPADM

## 2023-11-21 RX ORDER — HYDROCHLOROTHIAZIDE 12.5 MG/1
12.5 TABLET ORAL DAILY
Status: DISCONTINUED | OUTPATIENT
Start: 2023-11-21 | End: 2023-11-23

## 2023-11-21 RX ORDER — LIDOCAINE HYDROCHLORIDE 20 MG/ML
INJECTION, SOLUTION INFILTRATION; PERINEURAL AS NEEDED
Status: DISCONTINUED | OUTPATIENT
Start: 2023-11-21 | End: 2023-11-21 | Stop reason: SURG

## 2023-11-21 RX ORDER — OXYCODONE HYDROCHLORIDE AND ACETAMINOPHEN 5; 325 MG/1; MG/1
1-2 TABLET ORAL EVERY 4 HOURS PRN
Qty: 42 TABLET | Refills: 0 | Status: SHIPPED | OUTPATIENT
Start: 2023-11-21

## 2023-11-21 RX ORDER — IPRATROPIUM BROMIDE AND ALBUTEROL SULFATE 2.5; .5 MG/3ML; MG/3ML
3 SOLUTION RESPIRATORY (INHALATION) ONCE AS NEEDED
Status: DISCONTINUED | OUTPATIENT
Start: 2023-11-21 | End: 2023-11-21 | Stop reason: HOSPADM

## 2023-11-21 RX ORDER — SODIUM CHLORIDE, SODIUM LACTATE, POTASSIUM CHLORIDE, CALCIUM CHLORIDE 600; 310; 30; 20 MG/100ML; MG/100ML; MG/100ML; MG/100ML
9 INJECTION, SOLUTION INTRAVENOUS CONTINUOUS
Status: DISCONTINUED | OUTPATIENT
Start: 2023-11-21 | End: 2023-11-23

## 2023-11-21 RX ORDER — HYDRALAZINE HYDROCHLORIDE 20 MG/ML
5 INJECTION INTRAMUSCULAR; INTRAVENOUS
Status: DISCONTINUED | OUTPATIENT
Start: 2023-11-21 | End: 2023-11-21 | Stop reason: HOSPADM

## 2023-11-21 RX ORDER — FERROUS SULFATE 325(65) MG
325 TABLET ORAL
Status: DISCONTINUED | OUTPATIENT
Start: 2023-11-22 | End: 2023-11-25 | Stop reason: HOSPADM

## 2023-11-21 RX ADMIN — ROPIVACAINE HYDROCHLORIDE 15 ML: 5 INJECTION EPIDURAL; INFILTRATION; PERINEURAL at 12:32

## 2023-11-21 RX ADMIN — PROPOFOL 50 MG: 10 INJECTION, EMULSION INTRAVENOUS at 13:43

## 2023-11-21 RX ADMIN — ONDANSETRON 4 MG: 2 INJECTION INTRAMUSCULAR; INTRAVENOUS at 13:44

## 2023-11-21 RX ADMIN — CELECOXIB 200 MG: 200 CAPSULE ORAL at 11:29

## 2023-11-21 RX ADMIN — OXYCODONE AND ACETAMINOPHEN 1 TABLET: 5; 325 TABLET ORAL at 21:17

## 2023-11-21 RX ADMIN — SODIUM CHLORIDE, POTASSIUM CHLORIDE, SODIUM LACTATE AND CALCIUM CHLORIDE 9 ML/HR: 600; 310; 30; 20 INJECTION, SOLUTION INTRAVENOUS at 12:38

## 2023-11-21 RX ADMIN — DEXAMETHASONE SODIUM PHOSPHATE 4 MG: 4 INJECTION, SOLUTION INTRAMUSCULAR; INTRAVENOUS at 13:44

## 2023-11-21 RX ADMIN — HYDRALAZINE HYDROCHLORIDE 5 MG: 20 INJECTION, SOLUTION INTRAMUSCULAR; INTRAVENOUS at 14:13

## 2023-11-21 RX ADMIN — Medication 10 ML: at 20:41

## 2023-11-21 RX ADMIN — FENTANYL CITRATE 50 MCG: 50 INJECTION, SOLUTION INTRAMUSCULAR; INTRAVENOUS at 14:03

## 2023-11-21 RX ADMIN — HYDRALAZINE HYDROCHLORIDE 5 MG: 20 INJECTION, SOLUTION INTRAMUSCULAR; INTRAVENOUS at 15:17

## 2023-11-21 RX ADMIN — PROPOFOL 130 MG: 10 INJECTION, EMULSION INTRAVENOUS at 13:39

## 2023-11-21 RX ADMIN — OXYCODONE AND ACETAMINOPHEN 1 TABLET: 5; 325 TABLET ORAL at 16:01

## 2023-11-21 RX ADMIN — FENTANYL CITRATE 50 MCG: 50 INJECTION, SOLUTION INTRAMUSCULAR; INTRAVENOUS at 13:55

## 2023-11-21 RX ADMIN — PRAVASTATIN SODIUM 40 MG: 40 TABLET ORAL at 20:40

## 2023-11-21 RX ADMIN — FENTANYL CITRATE 25 MCG: 50 INJECTION, SOLUTION INTRAMUSCULAR; INTRAVENOUS at 15:42

## 2023-11-21 RX ADMIN — ACETAMINOPHEN 1000 MG: 500 TABLET ORAL at 11:29

## 2023-11-21 RX ADMIN — FENTANYL CITRATE 25 MCG: 50 INJECTION, SOLUTION INTRAMUSCULAR; INTRAVENOUS at 16:01

## 2023-11-21 RX ADMIN — MAGNESIUM SULFATE HEPTAHYDRATE 2 G: 500 INJECTION, SOLUTION INTRAMUSCULAR; INTRAVENOUS at 13:53

## 2023-11-21 RX ADMIN — FENTANYL CITRATE 25 MCG: 50 INJECTION, SOLUTION INTRAMUSCULAR; INTRAVENOUS at 15:47

## 2023-11-21 RX ADMIN — LIDOCAINE HYDROCHLORIDE 60 MG: 20 INJECTION, SOLUTION INFILTRATION; PERINEURAL at 13:39

## 2023-11-21 RX ADMIN — CEFAZOLIN 2 G: 225 INJECTION, POWDER, FOR SOLUTION INTRAMUSCULAR; INTRAVENOUS at 15:08

## 2023-11-21 RX ADMIN — SODIUM CHLORIDE, POTASSIUM CHLORIDE, SODIUM LACTATE AND CALCIUM CHLORIDE: 600; 310; 30; 20 INJECTION, SOLUTION INTRAVENOUS at 15:17

## 2023-11-21 RX ADMIN — FENTANYL CITRATE 25 MCG: 50 INJECTION, SOLUTION INTRAMUSCULAR; INTRAVENOUS at 16:06

## 2023-11-21 RX ADMIN — FENTANYL CITRATE 50 MCG: 50 INJECTION, SOLUTION INTRAMUSCULAR; INTRAVENOUS at 12:30

## 2023-11-21 RX ADMIN — SUGAMMADEX 110 MG: 100 INJECTION, SOLUTION INTRAVENOUS at 15:19

## 2023-11-21 RX ADMIN — ROCURONIUM BROMIDE 40 MG: 10 INJECTION, SOLUTION INTRAVENOUS at 13:39

## 2023-11-21 RX ADMIN — CLINDAMYCIN IN 5 PERCENT DEXTROSE 900 MG: 18 INJECTION, SOLUTION INTRAVENOUS at 20:40

## 2023-11-21 RX ADMIN — TRANEXAMIC ACID 1000 MG: 100 INJECTION INTRAVENOUS at 13:44

## 2023-11-21 RX ADMIN — SODIUM CHLORIDE 100 ML/HR: 4.5 INJECTION, SOLUTION INTRAVENOUS at 17:04

## 2023-11-21 RX ADMIN — CEFAZOLIN SODIUM 2000 MG: 2 INJECTION, SOLUTION INTRAVENOUS at 13:22

## 2023-11-21 NOTE — H&P
Orthopaedic Surgery History and Physical    Patient Name:  Gaby Jackson  YOB: 1945   Age: 78 y.o.  Medical Records Number:  [id number]    Date of Admission:  11/21/2023 10:37 AM    Chief Complaint:  No admission diagnoses are documented for this encounter.    Gaby Jackson is a 78 y.o. female who presents c/o severe left knee pain.  The pain has been on and off for many years, worsening to the point where the pain is becoming disabling.  The pain is a constant dull ache with occasional sharp, stabbing pain.  The patient has failed conservative treatment and would like to proceed with left total knee arthroplasty.    /81 (BP Location: Left arm, Patient Position: Sitting)   Pulse 67   Temp 97.6 °F (36.4 °C) (Oral)   Resp 18   SpO2 99%     Past Medical History:    Past Medical History:   Diagnosis Date    Arthritis     Asthma     Birth injury     RIGHT ARM    Hyperlipidemia     Hypertension     Overactive bladder        Past Surgical History:   Past Surgical History:   Procedure Laterality Date    NO PAST SURGERIES         Social History:    Social History     Socioeconomic History    Marital status: Single   Tobacco Use    Smoking status: Never    Smokeless tobacco: Never   Vaping Use    Vaping Use: Never used   Substance and Sexual Activity    Alcohol use: No    Drug use: Never    Sexual activity: Defer       Family History:    Family History   Problem Relation Age of Onset    Lung disease Mother     Hypertension Mother     Emphysema Mother     Heart disease Father     Hypertension Father     Diabetes Sister     Breast cancer Neg Hx     Malig Hyperthermia Neg Hx        Current Medications:  Scheduled Meds:ceFAZolin, 2,000 mg, Intravenous, Once  ropivacaine 0.5 % 50 mL, Morphine 5 mg, ketorolac 30 mg, EPINEPHrine 1 mg/mL 0.3 mg in sodium chloride 0.9 % 50 mL solution, , Injection, Once  sodium chloride, 3 mL, Intravenous, Q12H      Continuous Infusions:lactated ringers, 9 mL/hr      PRN  Meds:.  fentanyl    lidocaine    sodium chloride    Current Facility-Administered Medications:     ceFAZolin in dextrose (ANCEF) IVPB solution 2,000 mg, 2,000 mg, Intravenous, Once, Joseph Joshi MD    fentaNYL citrate (PF) (SUBLIMAZE) injection 50 mcg, 50 mcg, Intravenous, Once PRN, Hermann Quintero MD    lactated ringers infusion, 9 mL/hr, Intravenous, Continuous, Hermann Quintero MD    lidocaine (XYLOCAINE) 1 % injection 0.5 mL, 0.5 mL, Intradermal, Once PRN, Hermann Quintero MD    ropivacaine 0.5 % 50 mL, Morphine 5 mg, ketorolac 30 mg, EPINEPHrine 1 mg/mL 0.3 mg in sodium chloride 0.9 % 50 mL solution, , Injection, Once, Joseph Joshi MD    sodium chloride 0.9 % flush 3 mL, 3 mL, Intravenous, Q12H, Hermann Quintero MD    sodium chloride 0.9 % flush 3-10 mL, 3-10 mL, Intravenous, PRN, Hermann Quintero MD    Allergies:  No Known Allergies    Review of Systems:   HEENT: Patient denies any headaches, vision changes, change in hearing, or tinnitus, Patient denies any rhinorrhea,epistaxis, sinus pain, mouth or dental problems, sore throat or hoarseness, or dysphagia  Pulmonary: Patient denies any cough, congestion, SOA, or wheezing  Cardiovascular: Patient denies any chest pain, dyspnea, palpitations, weakness, intolerance of exercise, varicosities, swelling of extremities, known murmur  Gastrointestinal:  Patient denies nausea, vomiting, diarrhea, constipation, loss  of appetite, change in appetite, dysphagia, gas, heartburn, melena, change in bowel habits, use of laxatives or other drugs to alter the function of the gastrointestinal tract.  Genital/Urinary: Patient denies dysuria, change in color of urine, change in frequency of urination, pain with urgency, incontinence, retention, or nocturia.  Musculoskeletal: Patient denies increased warmth; redness; or swelling of joints; limitation of function; deformity; crepitation: pain in a joint or an extremity, the neck, or the back, especially  with movement.  Neurological: Patient denies dizziness, tremor, ataxia, difficulty in speaking, change in speech, paresthesia, loss of sensation, seizures, syncope, changes in memory.  Endocrine system: Patient denies tremors, palpitations, intolerance of heat or cold, polyuria, polydipsia, polyphagia, diaphoresis, exophthalmos, or goiter.  Psychological: Patient denies thoughts/plans or harming self or other; depression,  insomnia, night terrors, horace, memory loss, disorientation.  Skin: Patient denies any bruising, rashes, discoloration, pruritus, wounds, ulcers, decubiti, changes in the hair or nails  Hematopoietic: Patient denies history of spontaneous or excessive bleeding, epistaxis, hematuria, melena, fatigue, enlarged or tender lymph nodes, pallor, history of anemia.        Physical Exam:   Awake, A&O x3, affect normal, no acute distress  Ambulating with a limp due to knee pain  Knee ROM is limited due to pain (5-115)  Strength is 4/5 in the quad, hamstring and calf  Cap refill is normal, Sensation intact    Card:  RR, HD Stable  Pulm:  Regular breathing, no S.O.A  Abd:  Soft, NT, ND    Lab Results (last 24 hours)       ** No results found for the last 24 hours. **            XR Chest PA & Lateral, XR Knee 1 or 2 View Left    Result Date: 11/8/2023  Narrative: XR CHEST PA AND LATERAL-, XR KNEE 1 OR 2 VW LEFT-  HISTORY: 78-year-old female. Preoperative evaluation.  FINDINGS:  CHEST: There is no evidence for pneumonia, effusions, or CHF.  LEFT KNEE: There are severe osteoarthritic changes at the medial tibiofemoral compartment. There is a small joint effusion. .  This report was finalized on 11/8/2023 7:27 AM by Dr. Radha Granados M.D on Workstation: [id number]         Assessment:  End-stage Primary Left Knee Osteoarthritis    Plan:  Patient's pain is becoming disabling, despite extensive conservative treatment.  Radiographs reveal end-stage degenerative changes.  The risks of surgery, including, but not  limited to, heart attack, stroke, dying, DVT, nerve injury, vascular injury, arthrofibrosis and infection were discussed.  The alternatives and benefits were also discussed.  All questions answered and the patient wishes to proceed with left total knee arthroplasty.    Joseph Rodriguez PA-C  [facility]  [address] [place] [id number]  [phone]    11/21/2023    CC: Maicol Florentino MD, Joseph Joshi MD

## 2023-11-21 NOTE — ANESTHESIA PROCEDURE NOTES
Peripheral Block      Patient reassessed immediately prior to procedure    Patient location during procedure: holding area  Start time: 11/21/2023 12:32 PM  Reason for block: at surgeon's request and post-op pain management  Performed by  Anesthesiologist: Hermann Quintero MD  Preanesthetic Checklist  Completed: patient identified, IV checked, site marked, risks and benefits discussed, surgical consent, monitors and equipment checked, pre-op evaluation and timeout performed  Prep:  Pt Position: supine  Sterile barriers:cap, washed/disinfected hands, gloves, mask and alcohol skin prep  Prep: ChloraPrep  Patient monitoring: blood pressure monitoring, continuous pulse oximetry and EKG  Procedure    Sedation: yes  Performed under: local infiltration  Guidance:ultrasound guided    ULTRASOUND INTERPRETATION.  Using ultrasound guidance a 21 G gauge needle was placed in close proximity to the nerve, at which point, under ultrasound guidance anesthetic was injected in the area of the nerve and spread of the anesthesia was seen on ultrasound in close proximity thereto.  There were no abnormalities seen on ultrasound; a digital image was taken; and the patient tolerated the procedure with no complications. Images:still images obtained, printed/placed on chart    Laterality:left  Block Type:adductor canal block  Injection Technique:single-shot  Needle Type:echogenic and Tuohy  Needle Gauge:21 G  Resistance on Injection: none    Medications Used: ropivacaine (NAROPIN) 0.5 % injection - Injection   15 mL - 11/21/2023 12:32:00 PM      Post Assessment  Injection Assessment: negative aspiration for heme, no paresthesia on injection and incremental injection  Patient Tolerance:comfortable throughout block  Complications:no

## 2023-11-21 NOTE — PLAN OF CARE
Goal Outcome Evaluation:           Progress: improving  Outcome Evaluation: 77 y/o s/POD 0 L TKA. AOX4. Pt up w/ assist x2. Voiding function intact in small amounts via bsc. Neuro checks WNL, no c/o numbness/tingling. Bordered dsg and hemovac drain c/d/i. PIV running 0.45 Nacl at 100cc/hr. Needs met. VSS. RA. Educated pt on falls precautions. Plan to d/c home tomorrow pending PT evaluation. Will CTM.

## 2023-11-21 NOTE — ANESTHESIA PREPROCEDURE EVALUATION
Anesthesia Evaluation     Patient summary reviewed and Nursing notes reviewed   NPO Solid Status: > 8 hours  NPO Liquid Status: > 2 hours           Airway   Mallampati: II  TM distance: >3 FB  Neck ROM: full  Dental - normal exam     Pulmonary - normal exam   (+) asthma,  Cardiovascular - normal exam    ECG reviewed    (+) hypertension, hyperlipidemia  (-) valvular problems/murmurs, past MI, CAD, angina, CHF, cardiac stents, CABG      Neuro/Psych  (+) headaches  (-) seizures, neuromuscular disease, CVA, syncope  GI/Hepatic/Renal/Endo - negative ROS     Musculoskeletal     Abdominal    Substance History - negative use     OB/GYN negative ob/gyn ROS         Other   arthritis,                       Anesthesia Plan    ASA 2     general with block     intravenous induction     Anesthetic plan, risks, benefits, and alternatives have been provided, discussed and informed consent has been obtained with: patient.        CODE STATUS:

## 2023-11-21 NOTE — ANESTHESIA POSTPROCEDURE EVALUATION
Patient: Gaby Jackson    Procedure Summary       Date: 11/21/23 Room / [facility]:  [facility]    Anesthesia Start: 1326 Anesthesia Stop: 1527    Procedure: LEFT TOTAL KNEE ARTHROPLASTY (Left: Knee) Diagnosis:     Surgeons: Joseph Joshi MD Provider: Miguel Adkins MD    Anesthesia Type: general with block ASA Status: 2            Anesthesia Type: general with block    Vitals  Vitals Value Taken Time   /62 11/21/23 1545   Temp 36.9 °C (98.4 °F) 11/21/23 1525   Pulse 72 11/21/23 1600   Resp 16 11/21/23 1545   SpO2 99 % 11/21/23 1600   Vitals shown include unfiled device data.        Anesthesia Post Evaluation

## 2023-11-21 NOTE — OP NOTE
Orthopaedic Surgery Operative Note    Patient Name:  Gaby Jackson  YOB: 1945  Age: 78 y.o.  Medical Records Number:  [id number]    Date of Procedure:  11/21/2023    Pre-operative Diagnosis:  Primary Osteoarthritis Left Knee    Post-operative Diagnosis:  Primary Osteoarthritis Left Knee    Procedure Performed:  Left Total Knee Arthroplasty    Surgical Approach: Knee Mid-Vastus    Implants:  Biomet Vanguard TKA, 67.5 Femoral Component, 71 Tibial Tray with a 40 mm Modular Stem, 34 3-Peg Patella, 12 mm Standard Polyethylene Insert    Surgeon:  Joseph Joshi M.D.    Assistant: Natali Jackson (who was present during the critical portions of the case, thereby decreasing operative time and patient morbidity)    Anesthetic Type:  General    Estimated Blood Loss:  250cc's    Specimens: * No orders in the log *    No Complications      Indications for Procedure:  Gaby Jackson is a 78 y.o. female suffering from end stage degenerative changes in the left knee.  The patients pain is becoming disabling, despite extensive conservative care, including NSAIDS, therapy and injections. The risks, benefits and alternatives were discussed and the patient wishes to proceed with left total knee arthroplasty.      Procedure Performed:    After informed consent was obtained, the correct patient identified, the correct operative side marked by the operative surgeon, and pre-operative IV Kefzol  given (prior to tourniquet inflation), the patient was taken to the operating room and placed supine on the operating table.  After general anesthesia was induced, a surgical time out was performed and 1 gm of Tranxemic Acid given, a well-padded tourniquet was placed and the patient's left lower extremity was prepped with chloraprep and draped in a sterile fashion.    A midline incision was made overlying the left knee and we sharply dissected down to expose the parapatellar retinaculum.  A muscle sparing, mid-vastus, parapatellar  arthrotomy was performed and we elevated the soft tissue both medially and laterally.  The menisci and ACL were excised.  We everted the patella and measured this to be 23 mm and we removed 8 mm of bone down to 15 mm.  We measured the patella to be 34 and drilled our three drill holes.  We protected the patella with the patella protector and turned our attention to the femur and the tibia.    We drilled drill holes into the femoral and the tibial intramedullary canals and proceeded to irrigate the canals to remove the fatty marrow.  We used the intramedullary adry and the 5 degree valgus cut block to make our distal femoral cut.  Once we had a smooth surface, we measured the femur to be 67.5.  We assured we had rotational alignment using the epicondylar axis, then we used the four-in-one cut block to make our anterior, posterior, anterior and posterior chamfer cuts.  We placed our femoral trial, had excellent fit, extended the knee and marked Lewis's line on the tibia.      We then turned our attention to the tibia, where we irrigated the canal again.  We used the intramedullary adry and the 3 degree posterior sloped cut block to remove 2 mm of bone from the effected side of the tibia.  Prior to making our cut, we assured we had rotational alignment using the external guide and Lewis's line.  Once we had a smooth surface, we measured the tibia to be 71.  We then removed soft tissue and bony debris from the posterior aspect of the knee.    We placed our trial implants and had excellent fit, range of motion, stability and ligament balance, throughout a complete arc of motion with a 12 std polyethylene liner.  We removed our trial implants, punched the tibial keel, copiously irrigated the knee, then cemented our implants in place using Biomet cement.  Once the cement cured, we trialed again with the 10, 12 and 14 AS, standard and posterior lipped polyethylene liners.  The 12 std gave us the best range of motion,  "stability and ligament balance, throughout a complete arc of motion.  We removed the trials, copiously irrigated the knee with dilute betadine solution, gave IV antibiotics and local anesthetic, then placed our permanent polyethylene liner.  We placed a 1/8\" hemovac drain, then closed the arthrotomy with #1 Vicryl pop-off sutures.  The subcutaneous tissue was closed with 2-0 vicryl pop-off sutures.  The skin was closed with staples.  We placed a sterile dressing of Xeroform, 4x4's, abdominal pads, cast padding and an Ace Wrap.  All sponge and needle counts were correct.  The patient was then awakened from general anesthesia and taken to the recovery room in stable condition.    The patient will be started on anticoagulation for DVT prophylaxis.  IV antibiotics will be discontinued within 24 hours of surgery.  Immediately prior to surgery, there were no acute Thromboembolic nor Cardiovascular risk factors.  An updated Medical Reconciliation form is on the chart.    Joseph Rodriguez PA-C  [facility]  [address] [place] [address]  [phone]    11/21/2023      "

## 2023-11-21 NOTE — DISCHARGE SUMMARY
"  Orthopaedic Surgery Discharge Summary    Patient Name:  Gaby Jackson  YOB: 1945  Age: 78 y.o.  Medical Records Number:  [id number]    Date of Admission:  11/21/2023  Date of Discharge:  11/24/2023    Primary Discharge Diagnosis:  Primary osteoarthritis of left knee [M17.12]    Secondary Discharge Diagnosis:    Problems Addressed this Visit          Musculoskeletal and Injuries    * (Principal) Primary osteoarthritis of left knee - Primary    Relevant Medications    oxyCODONE-acetaminophen (PERCOCET) 5-325 MG per tablet     Diagnoses         Codes Comments    Primary osteoarthritis of left knee    -  Primary ICD-10-CM: M17.12  ICD-9-CM: 715.16             Procedures Performed:  Left Total Knee Arthroplasty      Hospital Course:    Gaby Jackson is a 78 y.o.  female who underwent successful left tka on 11/21/2023.  Gaby Jackson was started on Aspirin 325 mg po once daily post-operatively for DVT prophylaxis.  On post-op day 1 the patients dressing was changed and their incision was clean, with no signs of infection and their calf was soft, with no signs of DVT.  On POD 2 the patient had a run of Atrial Fibrillation, was transferred to the telemetry floor, started on Lovenox and Cardiology consulted.  She converted back to sinus rhythm on her own.  The patient progressed well with physical therapy and the patients hemoglobin remained stable. On post-operative day 3 the patient was felt ready for discharge.     Vitals:  Vitals:    11/21/23 1615 11/21/23 1626 11/21/23 1630 11/21/23 1643   BP: 164/71  155/93 152/65   BP Location:   Left arm Left arm   Patient Position:   Lying Lying   Pulse: 75 74 72 74   Resp: 16  16 17   Temp:   98.4 °F (36.9 °C) 98.3 °F (36.8 °C)   TempSrc:   Oral Oral   SpO2: 99% 90% 100% 96%   Weight:    57.2 kg (126 lb)   Height:    160 cm (63\")       Discharge Medications:      Discharge Medications        New Medications        Instructions Start Date   aspirin 325 MG EC " tablet   325 mg, Oral, Daily   Start Date: November 22, 2023     docusate sodium 250 MG capsule  Commonly known as: COLACE   250 mg, Oral, 2 Times Daily PRN      oxyCODONE-acetaminophen 5-325 MG per tablet  Commonly known as: PERCOCET   1-2 tablets, Oral, Every 4 Hours PRN             Continue These Medications        Instructions Start Date   acetaminophen 325 MG tablet  Commonly known as: TYLENOL   650 mg, Oral, Daily PRN      amitriptyline 10 MG tablet  Commonly known as: ELAVIL   10 mg, Oral, Nightly PRN      cholecalciferol 25 MCG (1000 UT) tablet  Commonly known as: VITAMIN D3   1,000 Units, Oral, Daily, HOLD FOR SURGERY      coenzyme Q10 100 MG capsule   200 mg, Oral, Daily, HOLD FOR SURGERY      diphenhydrAMINE 25 mg capsule  Commonly known as: BENADRYL   25 mg, Oral, Every 6 Hours PRN      hydroCHLOROthiazide 25 MG tablet  Commonly known as: HYDRODIURIL   12.5 mg, Oral, Daily      metoprolol succinate XL 50 MG 24 hr tablet  Commonly known as: TOPROL-XL   50 mg, Oral, Daily      pravastatin 40 MG tablet  Commonly known as: PRAVACHOL   40 mg, Oral, Nightly             Stop These Medications      meloxicam 15 MG tablet  Commonly known as: MOBIC              Pain Medications:  Percocet 5/325 mg 1-2 po q 4-6 hours prn pain    DVT Prophylaxis:  Enteric Coated Aspirin 325 mg po once daily for 2 weeks, then one daily for 4 weeks      Total Knee Joint Replacement Discharge Instructions:    I. ACTIVITIES:  1. Exercises:  Complete exercise program as taught by the hospital physical therapist 2 times per day  Exercise program will be advanced by the physical therapist  During the day be up ambulating every 2 hours (while awake) for short distances  Complete the ankle pump exercises at least 10 times per hour (while awake)  Elevate legs most of the day the first week post operatively and thereafter elevate legs when in bed and for at least 30 minutes during the day. Caution must be taken to avoid pillow placement under  the bend of the knee as this can led to flexion contractures of the knee.  Use cold packs 20-30 minutes approximately 5 times per day. This should be done before and after completing your exercises and at any time you are experiencing pain/ stiffness in your operative extremity.      2. Activities of Daily Living:  No tub baths, hot tubs, or swimming pools for 4 weeks  May shower and let water run over the incision on post-operative day #5 if no drainage. Do not scrub or rub the incision. Simply let the water run over the incision and pat dry.    II. Restrictions  Do not cross legs or kneel  Your surgeon will discuss with you when you will be able to drive again.  Weight bearing is as tolerated  First week stay inside on even terrain. May go up and down stairs one stair at a time utilizing the hand rail  After one week, you may venture outside.    III. Precautions:  Everyone that comes near you should wash their hands  No elective dental, genital-urinary, or colon procedures or surgical procedures for 12 weeks after surgery unless absolutely necessary.   If dental work or surgical procedure is deemed absolutely necessary, you will need to contact your surgeon as you will need to take antibiotics 1 hour prior to any dental work (including teeth cleanings).  Please discuss with your surgeon prophylactic antibiotics as the length of time this intervention will be necessary for you varies with each patient’s health history and situation.  Avoid sick people. If you must be around someone who is ill, they should wear a mask.  Avoid visits to the Emergency Room or Urgent Care unless you are having a life threatening event.   If ordered stockings are to be placed on in the morning and removed at night. Monitor the stockings to ensure that any swelling is not causing the stockings to become too tight. In this case, remove stockings immediately.    IV. INCISION CARE:  Wash your hands prior to dressing changes  Change the  dressing as needed to keep incision clean and dry. Utilize dry gauze and paper tape. Avoid touching the side of the gauze that goes against the incision with your hands.  No creams or ointments to the incision  May remove dressing once the incision is free of drainage  Do not touch or pick at the incision  Check incision every day and notify surgeon immediately if any of the following signs or symptoms are noted:  Increase in redness  Increase in swelling around the incision and of the entire extremity  Increase in pain  Drainage oozing from the incision  Pulling apart of the edges of the incision  Increase in overall body temperature (greater than 100.5 degrees)  Your surgeon will instruct you regarding suture or staple removal    V. Medications:   1. Anticoagulants: You will be discharged on an anticoagulant. This is a prophylactic medication that helps prevent blood clots during your post-operative period. The type and length of dosage varies based on your individual needs, procedure performed, and surgeon’s preference.  While taking the anticoagulant, you should avoid taking any additional aspirin, ibuprofen (Advil or Motrin), Aleve (Naprosyn) or other non-steroidal anti-inflammatory medications.   Notify surgeon immediately if any reyes bleeding is noted in the urine, stool, emesis, or from the nose or the incision. Blood in the stool will often appear as black rather than red. Blood in urine may appear as pink. Blood in emesis may appear as brown/black like coffee grounds.  You will need to apply pressure for longer periods of time to any cuts or abrasions to stop bleeding  Avoid alcohol while taking anticoagulants    2. Stool Softeners: You will be at greater risk of constipation after surgery due to being less mobile and the pain medications.   Take stool softeners as instructed by your surgeon while on pain medications. Over the counter Colace 100 mg 1-2 capsules twice daily.   If stools become too loose or  too frequent, please decreases the dosage or stop the stool softener.  If constipation occurs despite use of stool softeners, you are to continue the stool softeners and add a laxative (Milk of Magnesia 1 ounce daily as needed)  Drink plenty of fluids, and eat fruits and vegetables during your recovery time    3. Pain Medications utilized after surgery are narcotics and the law requires that the following information be given to all patients that are prescribed narcotics:  CLASSIFICATION: Pain medications are called Opioids and are narcotics  LEGALITIES: It is illegal to share narcotics with others and to drive within 24 hours of taking narcotics  POTENTIAL SIDE EFFECTS: Potential side effects of opioids include: nausea, vomiting, itching, dizziness, drowsiness, dry mouth, constipation, and difficulty urinating.  POTENTIAL ADVERSE EFFECTS:   Opioid tolerance can develop with use of pain medications and this simply means that it requires more and more of the medication to control pain; however, this is seen more in patients that use opioids for longer periods of time.  Opioid dependence can develop with use of Opioids and this simply means that to stop the medication can cause withdrawal symptoms; however, this is seen with patients that use Opioids for longer periods of time.  Opioid addiction can develop with use of Opioids and the incidence of this is very unlikely in patients who take the medications as ordered and stop the medications as instructed.  Opioid overdose can be dangerous, but is unlikely when the medication is taken as ordered and stopped when ordered. It is important not to mix opioids with alcohol or with and type of sedative such as Benadryl as this can lead to over sedation and respiratory difficulty.  DOSAGE:   Pain medications will need to be taken consistently for the first week to decrease pain and promote adequate pain relief and participation in physical therapy.  After the initial surgical  pain begins to resolve, you may begin to decrease the pain medication. By the end of 6-8 weeks, you should be off of pain medications.  Refills will not be given by the office during evening hours, on weekends, or after 6-8 weeks post-op.  To seek refills on pain medications during the initial 6 week post-operative period, you must call the office 48 hours in advance to request the refill. The office will then notify you when to  the prescription. DO NOT wait until you are out of the medication to request a refill.    V. FOLLOW-UP VISITS:  You will need to follow up in the office with your surgeon in 3 weeks. Please call this number [phone] to schedule this appointment.  If you have any concerns or suspected complications prior to your follow up visit, please call your surgeons office. Do not wait until your appointment time if you suspect complications. These will need to be addressed in the office promptly.    Joseph Rodriguez PA-C  [facility]  [address] [place] [address]  [phone]    11/21/2023    CC:Maicol Florentino MD:Joseph Joshi MD

## 2023-11-21 NOTE — ANESTHESIA PROCEDURE NOTES
Airway  Urgency: elective    Date/Time: 11/21/2023 1:41 PM  Airway not difficult    General Information and Staff    Patient location during procedure: OR  CRNA/CAA: Gopi Magallon CRNA    Indications and Patient Condition  Indications for airway management: airway protection    Preoxygenated: yes  Mask difficulty assessment: 1 - vent by mask    Final Airway Details  Final airway type: endotracheal airway      Successful airway: ETT  Cuffed: yes   Successful intubation technique: direct laryngoscopy  Facilitating devices/methods: intubating stylet and Bougie  Endotracheal tube insertion site: oral  Blade: Sohail  Blade size: 3  ETT size (mm): 7.0  Cormack-Lehane Classification: grade IIb - view of arytenoids or posterior of glottis only  Placement verified by: chest auscultation and capnometry   Measured from: teeth  ETT/EBT  to teeth (cm): 21  Number of attempts at approach: 1  Assessment: lips, teeth, and gum same as pre-op and atraumatic intubation

## 2023-11-22 LAB
ANION GAP SERPL CALCULATED.3IONS-SCNC: 11.1 MMOL/L (ref 5–15)
BUN SERPL-MCNC: 15 MG/DL (ref 8–23)
BUN/CREAT SERPL: 17.9 (ref 7–25)
CALCIUM SPEC-SCNC: 8.5 MG/DL (ref 8.6–10.5)
CHLORIDE SERPL-SCNC: 101 MMOL/L (ref 98–107)
CO2 SERPL-SCNC: 24.9 MMOL/L (ref 22–29)
CREAT SERPL-MCNC: 0.84 MG/DL (ref 0.57–1)
DEPRECATED RDW RBC AUTO: 39.7 FL (ref 37–54)
EGFRCR SERPLBLD CKD-EPI 2021: 71.7 ML/MIN/1.73
ERYTHROCYTE [DISTWIDTH] IN BLOOD BY AUTOMATED COUNT: 12.3 % (ref 12.3–15.4)
GLUCOSE SERPL-MCNC: 118 MG/DL (ref 65–99)
HCT VFR BLD AUTO: 26.3 % (ref 34–46.6)
HGB BLD-MCNC: 8.7 G/DL (ref 12–15.9)
MCH RBC QN AUTO: 28.9 PG (ref 26.6–33)
MCHC RBC AUTO-ENTMCNC: 33.1 G/DL (ref 31.5–35.7)
MCV RBC AUTO: 87.4 FL (ref 79–97)
PLATELET # BLD AUTO: 293 10*3/MM3 (ref 140–450)
PMV BLD AUTO: 9.8 FL (ref 6–12)
POTASSIUM SERPL-SCNC: 3.6 MMOL/L (ref 3.5–5.2)
RBC # BLD AUTO: 3.01 10*6/MM3 (ref 3.77–5.28)
SODIUM SERPL-SCNC: 137 MMOL/L (ref 136–145)
WBC NRBC COR # BLD AUTO: 8.56 10*3/MM3 (ref 3.4–10.8)

## 2023-11-22 PROCEDURE — 63710000001 FERROUS SULFATE 325 (65 FE) MG TABLET: Performed by: ORTHOPAEDIC SURGERY

## 2023-11-22 PROCEDURE — 25010000002 KETOROLAC TROMETHAMINE PER 15 MG: Performed by: ORTHOPAEDIC SURGERY

## 2023-11-22 PROCEDURE — 63710000001 ASPIRIN 325 MG TABLET DELAYED-RELEASE: Performed by: ORTHOPAEDIC SURGERY

## 2023-11-22 PROCEDURE — 25010000002 CLINDAMYCIN 900 MG/50ML SOLUTION: Performed by: ORTHOPAEDIC SURGERY

## 2023-11-22 PROCEDURE — 63710000001 OXYCODONE-ACETAMINOPHEN 5-325 MG TABLET: Performed by: ORTHOPAEDIC SURGERY

## 2023-11-22 PROCEDURE — A9270 NON-COVERED ITEM OR SERVICE: HCPCS | Performed by: ORTHOPAEDIC SURGERY

## 2023-11-22 PROCEDURE — G0378 HOSPITAL OBSERVATION PER HR: HCPCS

## 2023-11-22 PROCEDURE — 63710000001 PRAVASTATIN 40 MG TABLET: Performed by: ORTHOPAEDIC SURGERY

## 2023-11-22 PROCEDURE — 63710000001 HYDROCHLOROTHIAZIDE 12.5 MG TABLET: Performed by: ORTHOPAEDIC SURGERY

## 2023-11-22 PROCEDURE — 63710000001 METOPROLOL SUCCINATE XL 50 MG TABLET SUSTAINED-RELEASE 24 HOUR: Performed by: ORTHOPAEDIC SURGERY

## 2023-11-22 PROCEDURE — 85027 COMPLETE CBC AUTOMATED: CPT | Performed by: ORTHOPAEDIC SURGERY

## 2023-11-22 PROCEDURE — 80048 BASIC METABOLIC PNL TOTAL CA: CPT | Performed by: ORTHOPAEDIC SURGERY

## 2023-11-22 PROCEDURE — 97110 THERAPEUTIC EXERCISES: CPT

## 2023-11-22 PROCEDURE — 97162 PT EVAL MOD COMPLEX 30 MIN: CPT

## 2023-11-22 RX ADMIN — OXYCODONE AND ACETAMINOPHEN 1 TABLET: 5; 325 TABLET ORAL at 17:27

## 2023-11-22 RX ADMIN — Medication 10 ML: at 20:02

## 2023-11-22 RX ADMIN — OXYCODONE AND ACETAMINOPHEN 1 TABLET: 5; 325 TABLET ORAL at 13:07

## 2023-11-22 RX ADMIN — OXYCODONE AND ACETAMINOPHEN 1 TABLET: 5; 325 TABLET ORAL at 22:29

## 2023-11-22 RX ADMIN — Medication 10 ML: at 08:53

## 2023-11-22 RX ADMIN — KETOROLAC TROMETHAMINE 15 MG: 15 INJECTION, SOLUTION INTRAMUSCULAR; INTRAVENOUS at 08:58

## 2023-11-22 RX ADMIN — ASPIRIN 325 MG: 325 TABLET, COATED ORAL at 08:53

## 2023-11-22 RX ADMIN — OXYCODONE AND ACETAMINOPHEN 1 TABLET: 5; 325 TABLET ORAL at 05:09

## 2023-11-22 RX ADMIN — FERROUS SULFATE TAB 325 MG (65 MG ELEMENTAL FE) 325 MG: 325 (65 FE) TAB at 08:53

## 2023-11-22 RX ADMIN — HYDROCHLOROTHIAZIDE 12.5 MG: 12.5 TABLET ORAL at 08:53

## 2023-11-22 RX ADMIN — METOPROLOL SUCCINATE 50 MG: 50 TABLET, EXTENDED RELEASE ORAL at 08:53

## 2023-11-22 RX ADMIN — CLINDAMYCIN IN 5 PERCENT DEXTROSE 900 MG: 18 INJECTION, SOLUTION INTRAVENOUS at 05:02

## 2023-11-22 RX ADMIN — PRAVASTATIN SODIUM 40 MG: 40 TABLET ORAL at 20:01

## 2023-11-22 NOTE — PLAN OF CARE
Goal Outcome Evaluation:  Plan of Care Reviewed With: patient            Pt is pleasant and cooperative with care. Pain is adequately manged by PRN pain meds ordered.Pt follows falls precuations and call for assistance when getting out of bed

## 2023-11-22 NOTE — DISCHARGE PLACEMENT REQUEST
"Gaby Jackson (78 y.o. Female)       Date of Birth   1945    Social Security Number       Address   [address] [address]    Home Phone   [phone]    MRN   [id number]       Confucianist   Latter day    Marital Status   Single                            Admission Date   11/21/23    Admission Type   Elective    Admitting Provider   Joseph Joshi MD    Attending Provider   Joseph Joshi MD    Department, Room/Bed   [address], [facility]/       Discharge Date       Discharge Disposition   Home or Self Care    Discharge Destination                                 Attending Provider: Joseph Joshi MD    Allergies: No Known Allergies    Isolation: None   Infection: None   Code Status: CPR    Ht: 160 cm (63\")   Wt: 57.2 kg (126 lb)    Admission Cmt: None   Principal Problem: Primary osteoarthritis of left knee [M17.12]                   Active Insurance as of 11/21/2023       Primary Coverage       Payor Plan Insurance Group Employer/Plan Group    [facility] WELLC[employer] MED ADV HMO        Payor Plan Address Payor Plan Phone Number Payor Plan Fax Number Effective Dates    [address] [address]   11/1/2023 - None Entered    [facility] [address]         Subscriber Name Subscriber Birth Date Member ID       GABY JACKSON 1945 [id number]                     Emergency Contacts        (Rel.) Home Phone Work Phone Mobile Phone    JOSEPH JACKSON T (Son) [phone] -- --              "

## 2023-11-22 NOTE — CASE MANAGEMENT/SOCIAL WORK
Discharge Planning Assessment  [facility]     Patient Name: Gaby Jackson  MRN: [id number]  Today's Date: 11/22/2023    Admit Date: 11/21/2023    Plan: Home with family/friend support & [facility].   Discharge Needs Assessment       Row Name 11/22/23 1001       Living Environment    People in Home alone    Unique Family Situation Patient plans for her friend/Mikayla to stay with her after discharge.    Current Living Arrangements home    Primary Care Provided by self    Provides Primary Care For no one    Family Caregiver if Needed child(thang), adult;friend(s)    Quality of Family Relationships helpful;involved;supportive    Able to Return to Prior Arrangements yes       Resource/Environmental Concerns    Transportation Concerns none       Transition Planning    Patient/Family Anticipates Transition to home with family;home with help/services    Patient/Family Anticipated Services at Transition     Transportation Anticipated family or friend will provide       Discharge Needs Assessment    Readmission Within the Last 30 Days no previous admission in last 30 days    Equipment Currently Used at Home cane, straight    Discharge Facility/Level of Care Needs home with home health    Provided Post Acute Provider List? N/A    N/A Provider List Comment Declines; requests [facility].    Patient's Choice of Community Agency(s) [facility].                   Discharge Plan       Row Name 11/22/23 1002       Plan    Plan Home with family/friend support & [facility].    Patient/Family in Agreement with Plan yes    Plan Comments Spoke with the patient, verified current information and explained the role of the CCP. Patient said she lives alone and has family/friend support. She's IADL and uses a cane as needed. She has no history with /HH. Patient plans to d/c home with family/friend support and [facility]. Patient said her friend/Mikayla will be staying with her after d/c. [facility]plan received from [facility]  which plans for [facility]. Discussed with the patient. Referral sent in [employer]. Patient said her family will transport her home at d/c. No other needs idenified. CCP will follow.                  Continued Care and Services - Admitted Since 11/21/2023       Home Medical Care       Service Provider Request Status Selected Services Address Phone Fax Patient Preferred    [facility] Pending - Request Sent N/A [address] [facility] [address] [phone] --                  Expected Discharge Date and Time       Expected Discharge Date Expected Discharge Time    Nov 22, 2023            Demographic Summary       Row Name 11/22/23 1001       General Information    Admission Type observation    Reason for Consult discharge planning    Preferred Language English       Contact Information    Permission Granted to Share Info With ;family/designee                   Functional Status       Row Name 11/22/23 1001       Functional Status    Usual Activity Tolerance good       Functional Status, IADL    Medications independent    Meal Preparation assistive equipment    Housekeeping assistive equipment    Laundry assistive equipment    Shopping assistive equipment       Mental Status Summary    Recent Changes in Mental Status/Cognitive Functioning no changes                   Psychosocial       Row Name 11/22/23 1001       Intellectual Performance WDL    Level of Consciousness Alert       Coping/Stress    Patient Personal Strengths able to adapt    Sources of Support adult child(thang)    Reaction to Health Status accepting    Understanding of Condition and Treatment adequate understanding of medical condition;adequate understanding of treatment       Developmental Stage (Eriksson's)    Developmental Stage Stage 8 (65 years-death/Late Adulthood) Integrity vs. Sandra CARTER RN

## 2023-11-22 NOTE — PROGRESS NOTES
"Orthopedic Progress Note      Patient: Gaby Jackson    Date of Admission: 11/21/2023 10:37 AM    YOB: 1945    Medical Record Number: [id number]    Attending Physician: Joseph Joshi MD    Post Operative Day Number: 1    Status Post: MT ARTHRP KNE CONDYLE&PLATU MEDIAL&LAT COMPARTMENTS [35032] (LEFT TOTAL KNEE ARTHROPLASTY)      Current Medications:  Scheduled Meds:aspirin, 325 mg, Oral, Daily  ferrous sulfate, 325 mg, Oral, Daily With Breakfast  hydroCHLOROthiazide, 12.5 mg, Oral, Daily  metoprolol succinate XL, 50 mg, Oral, Daily  pravastatin, 40 mg, Oral, Nightly  sodium chloride, 10 mL, Intravenous, Q12H      Continuous Infusions:lactated ringers, 9 mL/hr, Last Rate: Stopped (11/21/23 1724)  sodium chloride, 100 mL/hr, Last Rate: 100 mL/hr (11/21/23 1905)      PRN Meds:.  acetaminophen    acetaminophen    amitriptyline    diazePAM    diphenhydrAMINE    diphenhydrAMINE **OR** diphenhydrAMINE    docusate sodium    ketorolac    melatonin    Morphine **AND** naloxone    ondansetron **OR** ondansetron    oxyCODONE-acetaminophen    oxyCODONE-acetaminophen    promethazine    sodium chloride    sodium chloride      Physical Exam: 78 y.o. female    Awake and alert. Pain controlled. No nausea  Doing well this morning.  Afeb vss  Dressing dry and intact. Thigh and calf soft  Able to dorsi and plantar flex  Negative homans    General Appearance:    Alert, cooperative, in no acute distress                   Vitals:    11/21/23 1630 11/21/23 1643 11/21/23 1754 11/21/23 2158   BP: 155/93 152/65 148/63 137/56   BP Location: Left arm Left arm Left arm Left arm   Patient Position: Lying Lying Lying Lying   Pulse: 72 74 67 68   Resp: 16 17 16 16   Temp: 98.4 °F (36.9 °C) 98.3 °F (36.8 °C) 97.3 °F (36.3 °C) 99 °F (37.2 °C)   TempSrc: Oral Oral Oral Oral   SpO2: 100% 96% 96% 99%   Weight:  57.2 kg (126 lb)     Height:  160 cm (63\")          Extremities:   Dressing Dry and Intact    Incision intact without signs or " symptoms of infections   Pulses:   Pulses palpable and equal bilaterally   Skin:   No bleeding, bruising or rash       Diagnostic Tests:    Admission on 11/21/2023   Component Date Value Ref Range Status    Glucose 11/22/2023 118 (H)  65 - 99 mg/dL Final    BUN 11/22/2023 15  8 - 23 mg/dL Final    Creatinine 11/22/2023 0.84  0.57 - 1.00 mg/dL Final    Sodium 11/22/2023 137  136 - 145 mmol/L Final    Potassium 11/22/2023 3.6  3.5 - 5.2 mmol/L Final    Chloride 11/22/2023 101  98 - 107 mmol/L Final    CO2 11/22/2023 24.9  22.0 - 29.0 mmol/L Final    Calcium 11/22/2023 8.5 (L)  8.6 - 10.5 mg/dL Final    BUN/Creatinine Ratio 11/22/2023 17.9  7.0 - 25.0 Final    Anion Gap 11/22/2023 11.1  5.0 - 15.0 mmol/L Final    eGFR 11/22/2023 71.7  >60.0 mL/min/1.73 Final    WBC 11/22/2023 8.56  3.40 - 10.80 10*3/mm3 Final    RBC 11/22/2023 3.01 (L)  3.77 - 5.28 10*6/mm3 Final    Hemoglobin 11/22/2023 8.7 (L)  12.0 - 15.9 g/dL Final    Hematocrit 11/22/2023 26.3 (L)  34.0 - 46.6 % Final    MCV 11/22/2023 87.4  79.0 - 97.0 fL Final    MCH 11/22/2023 28.9  26.6 - 33.0 pg Final    MCHC 11/22/2023 33.1  31.5 - 35.7 g/dL Final    RDW 11/22/2023 12.3  12.3 - 15.4 % Final    RDW-SD 11/22/2023 39.7  37.0 - 54.0 fl Final    MPV 11/22/2023 9.8  6.0 - 12.0 fL Final    Platelets 11/22/2023 293  140 - 450 10*3/mm3 Final       No results found.    Assessment:  Patient Active Problem List   Diagnosis    Hypertension    Hyperlipidemia    Avitaminosis D    Diplopia    Labyrinthitis of left ear    Fatigue    Cephalalgia    Blood glucose elevated    Primary osteoarthritis of both knees    Primary osteoarthritis of left knee           Plan:    Dc home today  Fu with dr aguero in 2 weeks    Discharge Plan: today to home      Date: 11/22/2023    Louise Muhammad APRN

## 2023-11-22 NOTE — PLAN OF CARE
Goal Outcome Evaluation:  Plan of Care Reviewed With: patient           Outcome Evaluation: Pt seen for PT diamond today. SHe is POD1 L TKR and presents w expected post op pain and weakness. SHe states she has been nauseated though this AM. Pt currently requiring min A to transition to EOB. SHe is able to stand w min A and Rwx. She ambulated approx 10 ft to BR and then an additional 15 ft in room w CGA and Rwx. She does fatigue quickly and does c/o mild dizziness when up. Pt up in chair at end of session. She was able to complete knee exercises w assistance. Pt declines attempt to practice steps at this time. She does have 10 steps to enter her condo. She plans home at [place] w assistance and HHPT. Pt will continue to benefit from skilled PT to maximize safety, strength, and independence w mobility.      Anticipated Discharge Disposition (PT): home with assist, home with home health

## 2023-11-22 NOTE — THERAPY EVALUATION
Patient Name: Gaby Jackson  : 1945    MRN: [id number]                              Today's Date: 2023       Admit Date: 2023    Visit Dx:     ICD-10-CM ICD-9-CM   1. Primary osteoarthritis of left knee  M17.12 715.16     Patient Active Problem List   Diagnosis    Hypertension    Hyperlipidemia    Avitaminosis D    Diplopia    Labyrinthitis of left ear    Fatigue    Cephalalgia    Blood glucose elevated    Primary osteoarthritis of both knees    Primary osteoarthritis of left knee     Past Medical History:   Diagnosis Date    Arthritis     Asthma     Birth injury     RIGHT ARM    Hyperlipidemia     Hypertension     Overactive bladder      Past Surgical History:   Procedure Laterality Date    NO PAST SURGERIES      TOTAL KNEE ARTHROPLASTY Left 2023    Procedure: LEFT TOTAL KNEE ARTHROPLASTY;  Surgeon: Joseph Joshi MD;  Location: [facility] [place] [facility] [place];  Service: Orthopedics;  Laterality: Left;      General Information       Row Name 23 1226          Physical Therapy Time and Intention    Document Type evaluation  -EJ     Mode of Treatment physical therapy  -EJ       Row Name 23 1226          General Information    Patient Profile Reviewed yes  -EJ     Prior Level of Function independent:;all household mobility;ADL's  -EJ     Existing Precautions/Restrictions fall  -EJ     Barriers to Rehab none identified  -EJ       Row Name 23 1226          Living Environment    People in Home alone  -EJ       Row Name 23 1226          Home Main Entrance    Number of Stairs, Main Entrance ten  -EJ       Row Name 23 1226          Cognition    Orientation Status (Cognition) oriented x 3  -EJ       Row Name 23 1226          Safety Issues, Functional Mobility    Impairments Affecting Function (Mobility) strength;endurance/activity tolerance;pain;range of motion (ROM)  -EJ               User Key  (r) = Recorded By, (t) = Taken By, (c) = Cosigned By      Initials Name Provider Type     EJ Sakshi Lambert, PT Physical Therapist                   Mobility       Row Name 11/22/23 1226          Bed Mobility    Bed Mobility supine-sit  -EJ     Supine-Sit [place] (Bed Mobility) verbal cues;minimum assist (75% patient effort)  -EJ       Row Name 11/22/23 1226          Sit-Stand Transfer    Sit-Stand [place] (Transfers) verbal cues;minimum assist (75% patient effort)  -EJ     Assistive Device (Sit-Stand Transfers) walker, front-wheeled  -EJ       Row Name 11/22/23 1226          Gait/Stairs (Locomotion)    [place] Level (Gait) verbal cues;contact guard;minimum assist (75% patient effort)  -EJ     Assistive Device (Gait) walker, front-wheeled  -EJ     Distance in Feet (Gait) 10' x 1, 15' x 1  -EJ     Deviations/Abnormal Patterns (Gait) antalgic;isabel decreased;stride length decreased  -EJ     Bilateral Gait Deviations forward flexed posture;heel strike decreased  -EJ     Comment, (Gait/Stairs) slow pace, cues for sequence, fatigues quickly  -               User Key  (r) = Recorded By, (t) = Taken By, (c) = Cosigned By      Initials Name Provider Type     Sakshi Lambert, PT Physical Therapist                   Obj/Interventions       [facility] Name 11/22/23 1228          Range of Motion Comprehensive    General Range of Motion no range of motion deficits identified  -     Comment, General Range of Motion x L knee  -[facility] Name 11/22/23 1228          Strength Comprehensive (MMT)    Comment, General Manual Muscle Testing (MMT) Assessment post op weakness  -[facility] Name 11/22/23 1228          Motor Skills    Therapeutic Exercise --  L knee protocol x 5 reps  -               User Key  (r) = Recorded By, (t) = Taken By, (c) = Cosigned By      Initials Name Provider Type    EJ Sakshi Lambert, PT Physical Therapist                   Goals/Plan       Row Name 11/22/23 1236          Bed Mobility Goal 1 (PT)    Activity/Assistive Device (Bed Mobility Goal 1, PT) bed mobility  activities, all  -EJ     [place] Level/Cues Needed (Bed Mobility Goal 1, PT) contact guard required  -EJ     Time Frame (Bed Mobility Goal 1, PT) 3 days  -EJ       Row Name 11/22/23 1236          Transfer Goal 1 (PT)    Activity/Assistive Device (Transfer Goal 1, PT) transfers, all;walker, rolling  -EJ     [place] Level/Cues Needed (Transfer Goal 1, PT) contact guard required  -EJ     Time Frame (Transfer Goal 1, PT) 3 days  -EJ       Row Name 11/22/23 1236          Gait Training Goal 1 (PT)    Activity/Assistive Device (Gait Training Goal 1, PT) gait (walking locomotion);walker, rolling  -EJ     Humphrey Level (Gait Training Goal 1, PT) contact guard required  -EJ     Distance (Gait Training Goal 1, PT) 50  -EJ     Time Frame (Gait Training Goal 1, PT) 3 days  -EJ       Row Name 11/22/23 1236          ROM Goal 1 (PT)    ROM Goal 1 (PT) L knee 5-90  -EJ     Time Frame (ROM Goal 1, PT) 3 days  -EJ       Row Name 11/22/23 1236          Stairs Goal 1 (PT)    Activity/Assistive Device (Stairs Goal 1, PT) stairs, all skills  -EJ     [place] Level/Cues Needed (Stairs Goal 1, PT) contact guard required  -EJ     Number of Stairs (Stairs Goal 1, PT) 4  -EJ     Time Frame (Stairs Goal 1, PT) 3 days  -EJ       Row Name 11/22/23 1236          Therapy Assessment/Plan (PT)    Planned Therapy Interventions (PT) balance training;bed mobility training;gait training;home exercise program;stretching;strengthening;stair training;ROM (range of motion);patient/family education;transfer training  -EJ               User Key  (r) = Recorded By, (t) = Taken By, (c) = Cosigned By      Initials Name Provider Type    EJ Sakshi Lambert, PT Physical Therapist                   Clinical Impression       Row Name 11/22/23 1231          Pain    Pretreatment Pain Rating 7/10  -EJ     Posttreatment Pain Rating 7/10  -EJ     Pain Location - Side/Orientation Left  -EJ     Pain Location - knee  -EJ     Pain Intervention(s)  Repositioned;Ambulation/increased activity  -EJ       Row Name 11/22/23 1231          Plan of Care Review    Plan of Care Reviewed With patient  -EJ     Outcome Evaluation Pt seen for PT eval today. SHe is POD1 L TKR and presents w expected post op pain and weakness. SHe states she has been nauseated though this AM. Pt currently requiring min A to transition to EOB. SHe is able to stand w min A and Rwx. She ambulated approx 10 ft to BR and then an additional 15 ft in room w CGA and Rwx. She does fatigue quickly and does c/o mild dizziness when up. Pt up in chair at end of session. She was able to complete knee exercises w assistance. Pt declines attempt to practice steps at this time. She does have 10 steps to enter her condo. She plans home at [place] w assistance and HHPT. Pt will continue to benefit from skilled PT to maximize safety, strength, and independence w mobility.  -EJ       Row Name 11/22/23 1231          Therapy Assessment/Plan (PT)    Rehab Potential (PT) good, to achieve stated therapy goals  -EJ     Criteria for Skilled Interventions Met (PT) yes  -EJ     Therapy Frequency (PT) daily  -EJ       Row Name 11/22/23 1231          Positioning and Restraints    Pre-Treatment Position in bed  -EJ     Post Treatment Position chair  -EJ     In Chair notified nsg;reclined;call light within reach;encouraged to call for assist;exit alarm on;with family/caregiver  -EJ               User Key  (r) = Recorded By, (t) = Taken By, (c) = Cosigned By      Initials Name Provider Type    EJ Sakshi Lambert, PT Physical Therapist                   Outcome Measures       Row Name 11/22/23 1237 11/22/23 0858       How much help from another person do you currently need...    Turning from your back to your side while in flat bed without using bedrails? 4  -EJ 4  -ST    Moving from lying on back to sitting on the side of a flat bed without bedrails? 3  -EJ 3  -ST    Moving to and from a bed to a chair (including a wheelchair)?  3  -EJ 3  -ST    Standing up from a chair using your arms (e.g., wheelchair, bedside chair)? 3  -EJ 3  -ST    Climbing 3-5 steps with a railing? 2  -EJ 2  -ST    To walk in hospital room? 3  -EJ 3  -ST    AM-PAC 6 Clicks Score (PT) 18  -EJ 18  -ST    Highest Level of Mobility Goal 6 --> Walk 10 steps or more  -EJ 6 --> Walk 10 steps or more  -ST              User Key  (r) = Recorded By, (t) = Taken By, (c) = Cosigned By      Initials Name Provider Type    Sakshi Candelario, PT Physical Therapist    Zoey Hannah, RN Registered Nurse                                   PT Recommendation and Plan  Planned Therapy Interventions (PT): balance training, bed mobility training, gait training, home exercise program, stretching, strengthening, stair training, ROM (range of motion), patient/family education, transfer training  Plan of Care Reviewed With: patient  Outcome Evaluation: Pt seen for PT eval today. SHe is POD1 L TKR and presents w expected post op pain and weakness. SHe states she has been nauseated though this AM. Pt currently requiring min A to transition to EOB. SHe is able to stand w min A and Rwx. She ambulated approx 10 ft to BR and then an additional 15 ft in room w CGA and Rwx. She does fatigue quickly and does c/o mild dizziness when up. Pt up in chair at end of session. She was able to complete knee exercises w assistance. Pt declines attempt to practice steps at this time. She does have 10 steps to enter her condo. She plans home at [place] w assistance and HHPT. Pt will continue to benefit from skilled PT to maximize safety, strength, and independence w mobility.     Time Calculation:         PT Charges       Row Name 11/22/23 1238             Time Calculation    Start Time 1200  -EJ      Stop Time 1220  -EJ      Time Calculation (min) 20 min  -EJ      PT Received On 11/22/23  -EJ      PT - Next Appointment 11/23/23  -EJ      PT Goal Re-Cert Due Date 11/25/23  -EJ         Time Calculation- PT    Total  Timed Code Minutes- PT 15 minute(s)  -HUGO                User Key  (r) = Recorded By, (t) = Taken By, (c) = Cosigned By      Initials Name Provider Type    Sakshi Candelario, PT Physical Therapist                  Therapy Charges for Today       Code Description Service Date Service Provider Modifiers Qty    [id number]  PT EVAL MOD COMPLEXITY 3 11/22/2023 Sakshi Lambert, PT GP 1    [id number] HC PT THER PROC EA 15 MIN 11/22/2023 Sakshi Lambert, PT GP 1            PT G-Codes  AM-PAC 6 Clicks Score (PT): 18  PT Discharge Summary  Anticipated Discharge Disposition (PT): home with assist, home with home health    Sakshi Lambert, PT  11/22/2023

## 2023-11-22 NOTE — PLAN OF CARE
Goal Outcome Evaluation:           Progress: improving  Outcome Evaluation: Patient able to tolerate pain pills if crushed in apple sauce. Able to swallow other pills fine whole. Unable to attempt steps with PT today, patient has 10 to get into home. Plan discharge home tomorrow if deemed stable.

## 2023-11-23 PROBLEM — I48.91 ATRIAL FIBRILLATION: Status: ACTIVE | Noted: 2023-11-23

## 2023-11-23 PROBLEM — E87.6 HYPOKALEMIA: Status: ACTIVE | Noted: 2023-11-23

## 2023-11-23 LAB
ANION GAP SERPL CALCULATED.3IONS-SCNC: 12.2 MMOL/L (ref 5–15)
BASOPHILS # BLD AUTO: 0.03 10*3/MM3 (ref 0–0.2)
BASOPHILS NFR BLD AUTO: 0.2 % (ref 0–1.5)
BUN SERPL-MCNC: 12 MG/DL (ref 8–23)
BUN/CREAT SERPL: 18.2 (ref 7–25)
CALCIUM SPEC-SCNC: 9.1 MG/DL (ref 8.6–10.5)
CHLORIDE SERPL-SCNC: 98 MMOL/L (ref 98–107)
CO2 SERPL-SCNC: 27.8 MMOL/L (ref 22–29)
CREAT SERPL-MCNC: 0.66 MG/DL (ref 0.57–1)
DEPRECATED RDW RBC AUTO: 39.9 FL (ref 37–54)
EGFRCR SERPLBLD CKD-EPI 2021: 90.5 ML/MIN/1.73
EOSINOPHIL # BLD AUTO: 0.11 10*3/MM3 (ref 0–0.4)
EOSINOPHIL NFR BLD AUTO: 0.9 % (ref 0.3–6.2)
ERYTHROCYTE [DISTWIDTH] IN BLOOD BY AUTOMATED COUNT: 12.4 % (ref 12.3–15.4)
GLUCOSE SERPL-MCNC: 119 MG/DL (ref 65–99)
HCT VFR BLD AUTO: 30.8 % (ref 34–46.6)
HGB BLD-MCNC: 10.2 G/DL (ref 12–15.9)
IMM GRANULOCYTES # BLD AUTO: 0.04 10*3/MM3 (ref 0–0.05)
IMM GRANULOCYTES NFR BLD AUTO: 0.3 % (ref 0–0.5)
LYMPHOCYTES # BLD AUTO: 1.3 10*3/MM3 (ref 0.7–3.1)
LYMPHOCYTES NFR BLD AUTO: 10.7 % (ref 19.6–45.3)
MCH RBC QN AUTO: 29.1 PG (ref 26.6–33)
MCHC RBC AUTO-ENTMCNC: 33.1 G/DL (ref 31.5–35.7)
MCV RBC AUTO: 88 FL (ref 79–97)
MONOCYTES # BLD AUTO: 1.16 10*3/MM3 (ref 0.1–0.9)
MONOCYTES NFR BLD AUTO: 9.5 % (ref 5–12)
NEUTROPHILS NFR BLD AUTO: 78.4 % (ref 42.7–76)
NEUTROPHILS NFR BLD AUTO: 9.54 10*3/MM3 (ref 1.7–7)
NRBC BLD AUTO-RTO: 0 /100 WBC (ref 0–0.2)
PLATELET # BLD AUTO: 367 10*3/MM3 (ref 140–450)
PMV BLD AUTO: 10.1 FL (ref 6–12)
POTASSIUM SERPL-SCNC: 3.2 MMOL/L (ref 3.5–5.2)
RBC # BLD AUTO: 3.5 10*6/MM3 (ref 3.77–5.28)
SODIUM SERPL-SCNC: 138 MMOL/L (ref 136–145)
TROPONIN T SERPL HS-MCNC: 47 NG/L
TSH SERPL DL<=0.05 MIU/L-ACNC: 0.86 UIU/ML (ref 0.27–4.2)
WBC NRBC COR # BLD AUTO: 12.18 10*3/MM3 (ref 3.4–10.8)

## 2023-11-23 PROCEDURE — 80048 BASIC METABOLIC PNL TOTAL CA: CPT | Performed by: ORTHOPAEDIC SURGERY

## 2023-11-23 PROCEDURE — A9270 NON-COVERED ITEM OR SERVICE: HCPCS | Performed by: ORTHOPAEDIC SURGERY

## 2023-11-23 PROCEDURE — 63710000001 PRAVASTATIN 40 MG TABLET: Performed by: ORTHOPAEDIC SURGERY

## 2023-11-23 PROCEDURE — 63710000001 HYDROCHLOROTHIAZIDE 12.5 MG TABLET: Performed by: ORTHOPAEDIC SURGERY

## 2023-11-23 PROCEDURE — 97110 THERAPEUTIC EXERCISES: CPT

## 2023-11-23 PROCEDURE — A9270 NON-COVERED ITEM OR SERVICE: HCPCS | Performed by: HOSPITALIST

## 2023-11-23 PROCEDURE — 84484 ASSAY OF TROPONIN QUANT: CPT | Performed by: HOSPITALIST

## 2023-11-23 PROCEDURE — 63710000001 METOPROLOL SUCCINATE XL 50 MG TABLET SUSTAINED-RELEASE 24 HOUR: Performed by: ORTHOPAEDIC SURGERY

## 2023-11-23 PROCEDURE — 93005 ELECTROCARDIOGRAM TRACING: CPT | Performed by: ORTHOPAEDIC SURGERY

## 2023-11-23 PROCEDURE — 84443 ASSAY THYROID STIM HORMONE: CPT | Performed by: HOSPITALIST

## 2023-11-23 PROCEDURE — 85025 COMPLETE CBC W/AUTO DIFF WBC: CPT | Performed by: ORTHOPAEDIC SURGERY

## 2023-11-23 PROCEDURE — 63710000001 FERROUS SULFATE 325 (65 FE) MG TABLET: Performed by: ORTHOPAEDIC SURGERY

## 2023-11-23 PROCEDURE — 63710000001 ASPIRIN 325 MG TABLET DELAYED-RELEASE: Performed by: ORTHOPAEDIC SURGERY

## 2023-11-23 PROCEDURE — G0378 HOSPITAL OBSERVATION PER HR: HCPCS

## 2023-11-23 PROCEDURE — 63710000001 OXYCODONE-ACETAMINOPHEN 5-325 MG TABLET: Performed by: ORTHOPAEDIC SURGERY

## 2023-11-23 PROCEDURE — 93010 ELECTROCARDIOGRAM REPORT: CPT | Performed by: INTERNAL MEDICINE

## 2023-11-23 PROCEDURE — 63710000001 METOPROLOL SUCCINATE XL 50 MG TABLET SUSTAINED-RELEASE 24 HOUR: Performed by: HOSPITALIST

## 2023-11-23 RX ORDER — METOPROLOL SUCCINATE 50 MG/1
50 TABLET, EXTENDED RELEASE ORAL EVERY 12 HOURS SCHEDULED
Status: DISCONTINUED | OUTPATIENT
Start: 2023-11-24 | End: 2023-11-25 | Stop reason: HOSPADM

## 2023-11-23 RX ORDER — ENOXAPARIN SODIUM 100 MG/ML
1 INJECTION SUBCUTANEOUS EVERY 12 HOURS
Status: DISCONTINUED | OUTPATIENT
Start: 2023-11-24 | End: 2023-11-25 | Stop reason: HOSPADM

## 2023-11-23 RX ORDER — ACETAMINOPHEN 325 MG/1
650 TABLET ORAL EVERY 4 HOURS PRN
Status: DISCONTINUED | OUTPATIENT
Start: 2023-11-23 | End: 2023-11-25 | Stop reason: HOSPADM

## 2023-11-23 RX ADMIN — Medication 10 ML: at 22:28

## 2023-11-23 RX ADMIN — METOPROLOL SUCCINATE 50 MG: 50 TABLET, EXTENDED RELEASE ORAL at 09:36

## 2023-11-23 RX ADMIN — ASPIRIN 325 MG: 325 TABLET, COATED ORAL at 09:36

## 2023-11-23 RX ADMIN — HYDROCHLOROTHIAZIDE 12.5 MG: 12.5 TABLET ORAL at 09:36

## 2023-11-23 RX ADMIN — OXYCODONE AND ACETAMINOPHEN 1 TABLET: 5; 325 TABLET ORAL at 09:36

## 2023-11-23 RX ADMIN — METOPROLOL SUCCINATE 50 MG: 50 TABLET, EXTENDED RELEASE ORAL at 23:21

## 2023-11-23 RX ADMIN — FERROUS SULFATE TAB 325 MG (65 MG ELEMENTAL FE) 325 MG: 325 (65 FE) TAB at 09:36

## 2023-11-23 RX ADMIN — PRAVASTATIN SODIUM 40 MG: 40 TABLET ORAL at 22:38

## 2023-11-23 RX ADMIN — Medication 10 ML: at 09:37

## 2023-11-23 NOTE — PLAN OF CARE
Goal Outcome Evaluation:  Plan of Care Reviewed With: patient    Reinforced pt knowledge on safety in swallowing precautions. Able to swallow her pills crushed inapple sauce without difficulty. Pt pain is managed by PRN painmeds ordered. PT had some urinary frequency going on. Reminded pt about safety in ambulation and keep on reminding to keep her legs straight. Pt have her knees bent all the time, stated that prior to surgery she bend her knees as it gave her pain relief

## 2023-11-23 NOTE — PLAN OF CARE
Goal Outcome Evaluation:  Plan of Care Reviewed With: patient        Progress: improving  Outcome Evaluation: A&OX4, WORKED C PT, AMBULATING IN ROOM, VOIDING PER BRP, ASSIST X 1, VSS, NVI, DSG CDI, RA/SL, DC HOME TOMORROW

## 2023-11-23 NOTE — PROGRESS NOTES
Orthopedic Progress Note      Patient: Gaby Jackson    Date of Admission: 11/21/2023 10:37 AM    YOB: 1945    Medical Record Number: [id number]    Attending Physician: Joseph Joshi MD    Post Operative Day Number: 2    Status Post: [place] ARTHRP KNE CONDYLE&PLATU MEDIAL&LAT COMPARTMENTS [[id number]] (LEFT TOTAL KNEE ARTHROPLASTY)      Current Medications:  Scheduled Meds:aspirin, 325 mg, Oral, Daily  ferrous sulfate, 325 mg, Oral, Daily With Breakfast  hydroCHLOROthiazide, 12.5 mg, Oral, Daily  metoprolol succinate XL, 50 mg, Oral, Daily  pravastatin, 40 mg, Oral, Nightly  sodium chloride, 10 mL, Intravenous, Q12H      Continuous Infusions:lactated ringers, 9 mL/hr, Last Rate: Stopped (11/21/23 1724)  sodium chloride, 100 mL/hr, Last Rate: 100 mL/hr (11/21/23 1905)      PRN Meds:.  acetaminophen    acetaminophen    amitriptyline    diazePAM    diphenhydrAMINE    diphenhydrAMINE **OR** diphenhydrAMINE    docusate sodium    ketorolac    melatonin    Morphine **AND** naloxone    ondansetron **OR** ondansetron    oxyCODONE-acetaminophen    oxyCODONE-acetaminophen    promethazine    sodium chloride    sodium chloride      Subjective:    Awake and alert. Pain controlled. Noted she had nausea with pain medicine but not otherwise.  Doing well this morning.  Worked with physical therapy yesterday but had difficulty with steps. She has 10 steps at home. Physical therapy continuing to work with her to optimize function before discharge    Physical Exam: 78 y.o. female    Afeb vss  Dressing dry and intact. Thigh and calf soft  Able to dorsi and plantar flex  Sensation intact light touch in the foot  Quad functioning  Negative homans    General Appearance:    Alert, cooperative, in no acute distress                   Vitals:    11/22/23 0955 11/22/23 1706 11/22/23 2221 11/23/23 0522   BP: 132/51 142/59 165/68 178/66   BP Location: Left arm Left arm Left arm Left arm   Patient Position: Lying Lying Lying Lying   Pulse:  67 73 79 80   Resp: 16 16 16 16   Temp: 98.2 °F (36.8 °C) 100.3 °F (37.9 °C) 100.2 °F (37.9 °C) 99.1 °F (37.3 °C)   TempSrc: Oral Oral Oral Oral   SpO2: 98% 93% 91% 100%   Weight:       Height:            Extremities:   Dressing Dry and Intact    Incision intact without signs or symptoms of infections   Pulses:   Pulses palpable and equal bilaterally   Skin:   No bleeding, bruising or rash       Diagnostic Tests:    Admission on 11/21/2023   Component Date Value Ref Range Status    Glucose 11/22/2023 118 (H)  65 - 99 mg/dL Final    BUN 11/22/2023 15  8 - 23 mg/dL Final    Creatinine 11/22/2023 0.84  0.57 - 1.00 mg/dL Final    Sodium 11/22/2023 137  136 - 145 mmol/L Final    Potassium 11/22/2023 3.6  3.5 - 5.2 mmol/L Final    Chloride 11/22/2023 101  98 - 107 mmol/L Final    CO2 11/22/2023 24.9  22.0 - 29.0 mmol/L Final    Calcium 11/22/2023 8.5 (L)  8.6 - 10.5 mg/dL Final    BUN/Creatinine Ratio 11/22/2023 17.9  7.0 - 25.0 Final    Anion Gap 11/22/2023 11.1  5.0 - 15.0 mmol/L Final    eGFR 11/22/2023 71.7  >60.0 mL/min/1.73 Final    WBC 11/22/2023 8.56  3.40 - 10.80 10*3/mm3 Final    RBC 11/22/2023 3.01 (L)  3.77 - 5.28 10*6/mm3 Final    Hemoglobin 11/22/2023 8.7 (L)  12.0 - 15.9 g/dL Final    Hematocrit 11/22/2023 26.3 (L)  34.0 - 46.6 % Final    MCV 11/22/2023 87.4  79.0 - 97.0 fL Final    MCH 11/22/2023 28.9  26.6 - 33.0 pg Final    MCHC 11/22/2023 33.1  31.5 - 35.7 g/dL Final    RDW 11/22/2023 12.3  12.3 - 15.4 % Final    RDW-SD 11/22/2023 39.7  37.0 - 54.0 fl Final    MPV 11/22/2023 9.8  6.0 - 12.0 fL Final    Platelets 11/22/2023 293  140 - 450 10*3/mm3 Final       No results found.    Assessment:  Patient Active Problem List   Diagnosis    Hypertension    Hyperlipidemia    Avitaminosis D    Diplopia    Labyrinthitis of left ear    Fatigue    Cephalalgia    Blood glucose elevated    Primary osteoarthritis of both knees    Primary osteoarthritis of left knee           Plan:    Continue working with physical  therapy until cleared for safe discharge to home with home PT  Fu with dr aguero in 2 weeks    Discharge Plan: To home once cleared by physical therapy.      Date: 11/23/2023    Julio C Foley MD

## 2023-11-23 NOTE — PLAN OF CARE
Goal Outcome Evaluation:  Plan of Care Reviewed With: patient        Progress: improving  Outcome Evaluation: Pt complaining of weakness and pain. Min A to reach EOB. Pt stood with min A and ambulated with min A distance of 10' to bathroom. Then 75' to hallway. Pt fatigued and reporting inc weakness with ambulation to hallway. Did not attempt stairs today due to level of fatigue. Pt has 10 stairs to enter home and will be home alone after 1-2 days of friend staying. Pt maintain L knee in flexed positoin throughout. 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

## 2023-11-23 NOTE — THERAPY TREATMENT NOTE
Patient Name: Gaby Jackson  : 1945    MRN: [id number]                              Today's Date: 2023       Admit Date: 2023    Visit Dx:     ICD-10-CM ICD-9-CM   1. Primary osteoarthritis of left knee  M17.12 715.16     Patient Active Problem List   Diagnosis    Hypertension    Hyperlipidemia    Avitaminosis D    Diplopia    Labyrinthitis of left ear    Fatigue    Cephalalgia    Blood glucose elevated    Primary osteoarthritis of both knees    Primary osteoarthritis of left knee     Past Medical History:   Diagnosis Date    Arthritis     Asthma     Birth injury     RIGHT ARM    Hyperlipidemia     Hypertension     Overactive bladder      Past Surgical History:   Procedure Laterality Date    NO PAST SURGERIES      TOTAL KNEE ARTHROPLASTY Left 2023    Procedure: LEFT TOTAL KNEE ARTHROPLASTY;  Surgeon: Joseph Joshi MD;  Location: [facility] [place];  Service: Orthopedics;  Laterality: Left;      General Information       Row Name 23 1340          Physical Therapy Time and Intention    Document Type therapy note (daily note)  -LB     Mode of Treatment physical therapy  -LB       Row Name 23 1340          General Information    Patient Profile Reviewed yes  -LB     Existing Precautions/Restrictions fall  -LB       Row Name 23 1340          Cognition    Orientation Status (Cognition) oriented x 4  -LB       Row Name 23 1340          Safety Issues, Functional Mobility    Impairments Affecting Function (Mobility) strength;endurance/activity tolerance;pain;range of motion (ROM)  -LB     Comment, Safety Issues/Impairments (Mobility) gait belt and non-skid socks donned throughout  -LB               User Key  (r) = Recorded By, (t) = Taken By, (c) = Cosigned By      Initials Name Provider Type    LB Silva Gonzales PT Physical Therapist                   Mobility       Row Name 23 1340          Bed Mobility    Bed Mobility supine-sit  -LB     Supine-Sit [place] (Bed  Mobility) verbal cues;minimum assist (75% patient effort)  -LB       Row Name 11/23/23 1340          Bed-Chair Transfer    Bed-Chair [place] (Transfers) minimum assist (75% patient effort);verbal cues;nonverbal cues (demo/gesture)  -LB     Assistive Device (Bed-Chair Transfers) walker, front-wheeled  -LB       Row Name 11/23/23 1340          Sit-Stand Transfer    Sit-Stand [place] (Transfers) verbal cues;minimum assist (75% patient effort)  -LB     Assistive Device (Sit-Stand Transfers) walker, front-wheeled  -LB       Row Name 11/23/23 1340          Gait/Stairs (Locomotion)    [place] Level (Gait) verbal cues;contact guard;minimum assist (75% patient effort)  -LB     Assistive Device (Gait) walker, front-wheeled  -LB     Distance in Feet (Gait) 10'. then 75'  -LB     Deviations/Abnormal Patterns (Gait) antalgic;isabel decreased;stride length decreased  -LB     Bilateral Gait Deviations forward flexed posture;heel strike decreased  -LB     Comment, (Gait/Stairs) pt with difficulty WBing through RUE for STS, did not attempt stairs due to inc fatigue and weakness with ambulation to hallway  -LB               User Key  (r) = Recorded By, (t) = Taken By, (c) = Cosigned By      Initials Name Provider Type    Silva Garland PT Physical Therapist                   Obj/Interventions       Row Name 11/23/23 1341          Range of Motion Comprehensive    Comment, General Range of Motion L knee maintained in flexion throughout, lacking grossly 20 deg of extension  -LB       Row Name 11/23/23 1341          Motor Skills    Therapeutic Exercise --  L TKA x 10 reps  -LB               User Key  (r) = Recorded By, (t) = Taken By, (c) = Cosigned By      Initials Name Provider Type    Silva Garland PT Physical Therapist                   Goals/Plan    No documentation.                  Clinical Impression       Row Name 11/23/23 1342          Pain    Pretreatment Pain Rating 6/10  -LB     Posttreatment Pain  Rating 6/10  -LB     Pain Location - knee  -LB     Pain Intervention(s) Ambulation/increased activity;Repositioned  -LB       Row Name 11/23/23 1342          Plan of Care Review    Plan of Care Reviewed With patient  -LB     Progress improving  -LB     Outcome Evaluation Pt complaining of weakness and pain. Min A to reach EOB. Pt stood with min A and ambulated with min A distance of 10' to bathroom. Then 75' to hallway. Pt fatigued and reporting inc weakness with ambulation to hallway. Did not attempt stairs today due to level of fatigue. Pt has 10 stairs to enter home and will be home alone after 1-2 days of friend staying. Pt maintain L knee in flexed positoin throughout. 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  -LB       Row Name 11/23/23 1342          Vital Signs    O2 Delivery Pre Treatment room air  -LB     O2 Delivery Intra Treatment room air  -LB     O2 Delivery Post Treatment room air  -LB     Pre Patient Position Supine  -LB     Intra Patient Position Standing  -LB     Post Patient Position Sitting  -LB       Row Name 11/23/23 1342          Positioning and Restraints    Pre-Treatment Position in bed  -LB     Post Treatment Position chair  -LB     In Chair sitting;call light within reach;exit alarm on;encouraged to call for assist  -LB               User Key  (r) = Recorded By, (t) = Taken By, (c) = Cosigned By      Initials Name Provider Type    Silva Garland, REAL Physical Therapist                   Outcome Measures       Row Name 11/23/23 1344 11/23/23 1230       How much help from another person do you currently need...    Turning from your back to your side while in flat bed without using bedrails? 4  -LB 4  -LP    Moving from lying on back to sitting on the side of a flat bed without bedrails? 3  -LB 3  -LP    Moving to and from a bed to a chair (including a wheelchair)? 3  -LB 3  -LP    Standing up from a chair using your arms (e.g., wheelchair, bedside chair)? 3  -LB  3  -LP    Climbing 3-5 steps with a railing? 2  -LB 2  -LP    To walk in hospital room? 3  -LB 3  -LP    AM-PAC 6 Clicks Score (PT) 18  -LB 18  -LP    Highest Level of Mobility Goal 6 --> Walk 10 steps or more  -LB 6 --> Walk 10 steps or more  -LP      Row Name 11/23/23 1344          Functional Assessment    Outcome Measure Options AM-PAC 6 Clicks Basic Mobility (PT)  -               User Key  (r) = Recorded By, (t) = Taken By, (c) = Cosigned By      Initials Name Provider Type    LP Theresa Cleveland, RN Registered Nurse    Silva Garland, PT Physical Therapist                                 Physical Therapy Education       Title: PT OT SLP Therapies (Done)       Topic: Physical Therapy (Done)       Point: Mobility training (Done)       Learning Progress Summary             Patient Acceptance, E,TB, VU,DU by  at 11/23/2023 1345                         Point: Home exercise program (Done)       Learning Progress Summary             Patient Acceptance, E,TB, VU,DU by  at 11/23/2023 1345                         Point: Body mechanics (Done)       Learning Progress Summary             Patient Acceptance, E,TB, VU,DU by  at 11/23/2023 1345                         Point: Precautions (Done)       Learning Progress Summary             Patient Acceptance, E,TB, VU,DU by  at 11/23/2023 1345                                         User Key       Initials Effective Dates Name Provider Type Discipline     08/09/20 -  Silva Gonzales, REAL Physical Therapist PT                  PT Recommendation and Plan     Plan of Care Reviewed With: patient  Progress: improving  Outcome Evaluation: Pt complaining of weakness and pain. Min A to reach EOB. Pt stood with min A and ambulated with min A distance of 10' to bathroom. Then 75' to hallway. Pt fatigued and reporting inc weakness with ambulation to hallway. Did not attempt stairs today due to level of fatigue. Pt has 10 stairs to enter home and will be home alone after 1-2 days of  friend staying. Pt maintain L knee in flexed positoin throughout. 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     Time Calculation:         PT Charges       Row Name 11/23/23 1346             Time Calculation    Start Time 0945  -LB      Stop Time 1005  -LB      Time Calculation (min) 20 min  -LB      PT Received On 11/23/23  -LB      PT - Next Appointment 11/24/23  -LB         Time Calculation- PT    Total Timed Code Minutes- PT 15 minute(s)  -LB                User Key  (r) = Recorded By, (t) = Taken By, (c) = Cosigned By      Initials Name Provider Type    Silva Garland PT Physical Therapist                  Therapy Charges for Today       Code Description Service Date Service Provider Modifiers Qty    43700246821 HC PT THER PROC EA 15 MIN 11/23/2023 Silva Gonzales PT GP 1            PT G-Codes  Outcome Measure Options: AM-PAC 6 Clicks Basic Mobility (PT)  AM-PAC 6 Clicks Score (PT): 18  PT Discharge Summary  Anticipated Discharge Disposition (PT): home with home health, skilled nursing facility    Silva Gonzales PT  11/23/2023

## 2023-11-24 ENCOUNTER — APPOINTMENT (OUTPATIENT)
Dept: CARDIOLOGY | Facility: HOSPITAL | Age: 78
End: 2023-11-24
Payer: MEDICARE

## 2023-11-24 LAB
ALBUMIN SERPL-MCNC: 3.6 G/DL (ref 3.5–5.2)
ANION GAP SERPL CALCULATED.3IONS-SCNC: 9.2 MMOL/L (ref 5–15)
AORTIC ARCH: 3.7 CM
AORTIC DIMENSIONLESS INDEX: 0.7 (DI)
BH CV ECHO MEAS - ACS: 1.83 CM
BH CV ECHO MEAS - AO MAX PG: 9.6 MMHG
BH CV ECHO MEAS - AO MEAN PG: 5.5 MMHG
BH CV ECHO MEAS - AO ROOT DIAM: 3.2 CM
BH CV ECHO MEAS - AO V2 MAX: 154.7 CM/SEC
BH CV ECHO MEAS - AO V2 VTI: 30.5 CM
BH CV ECHO MEAS - AVA(I,D): 2.33 CM2
BH CV ECHO MEAS - EDV(CUBED): 35.4 ML
BH CV ECHO MEAS - EDV(MOD-SP2): 58 ML
BH CV ECHO MEAS - EDV(MOD-SP4): 74 ML
BH CV ECHO MEAS - EF(MOD-BP): 61.4 %
BH CV ECHO MEAS - EF(MOD-SP2): 60.3 %
BH CV ECHO MEAS - EF(MOD-SP4): 62.2 %
BH CV ECHO MEAS - ESV(CUBED): 11.7 ML
BH CV ECHO MEAS - ESV(MOD-SP2): 23 ML
BH CV ECHO MEAS - ESV(MOD-SP4): 28 ML
BH CV ECHO MEAS - FS: 30.8 %
BH CV ECHO MEAS - IVS/LVPW: 1.07 CM
BH CV ECHO MEAS - IVSD: 0.99 CM
BH CV ECHO MEAS - LAT PEAK E' VEL: 9.2 CM/SEC
BH CV ECHO MEAS - LV DIASTOLIC VOL/BSA (35-75): 46.6 CM2
BH CV ECHO MEAS - LV MASS(C)D: 89 GRAMS
BH CV ECHO MEAS - LV MAX PG: 5.5 MMHG
BH CV ECHO MEAS - LV MEAN PG: 2.6 MMHG
BH CV ECHO MEAS - LV SYSTOLIC VOL/BSA (12-30): 17.6 CM2
BH CV ECHO MEAS - LV V1 MAX: 117.4 CM/SEC
BH CV ECHO MEAS - LV V1 VTI: 22.6 CM
BH CV ECHO MEAS - LVIDD: 3.3 CM
BH CV ECHO MEAS - LVIDS: 2.27 CM
BH CV ECHO MEAS - LVOT AREA: 3.1 CM2
BH CV ECHO MEAS - LVOT DIAM: 2 CM
BH CV ECHO MEAS - LVPWD: 0.93 CM
BH CV ECHO MEAS - MED PEAK E' VEL: 7.3 CM/SEC
BH CV ECHO MEAS - MV A DUR: 0.14 SEC
BH CV ECHO MEAS - MV A MAX VEL: 111.6 CM/SEC
BH CV ECHO MEAS - MV DEC SLOPE: 345.3 CM/SEC2
BH CV ECHO MEAS - MV DEC TIME: 287 SEC
BH CV ECHO MEAS - MV E MAX VEL: 90.7 CM/SEC
BH CV ECHO MEAS - MV E/A: 0.81
BH CV ECHO MEAS - MV MAX PG: 5.1 MMHG
BH CV ECHO MEAS - MV MEAN PG: 2.7 MMHG
BH CV ECHO MEAS - MV P1/2T: 92.1 MSEC
BH CV ECHO MEAS - MV V2 VTI: 28.7 CM
BH CV ECHO MEAS - MVA(P1/2T): 2.39 CM2
BH CV ECHO MEAS - MVA(VTI): 2.47 CM2
BH CV ECHO MEAS - PA ACC TIME: 0.1 SEC
BH CV ECHO MEAS - PA V2 MAX: 99.1 CM/SEC
BH CV ECHO MEAS - PULM A REVS DUR: 0.14 SEC
BH CV ECHO MEAS - PULM A REVS VEL: 30 CM/SEC
BH CV ECHO MEAS - PULM DIAS VEL: 35.7 CM/SEC
BH CV ECHO MEAS - PULM S/D: 1.29
BH CV ECHO MEAS - PULM SYS VEL: 46.2 CM/SEC
BH CV ECHO MEAS - RV MAX PG: 3.4 MMHG
BH CV ECHO MEAS - RV V1 MAX: 92.2 CM/SEC
BH CV ECHO MEAS - RV V1 VTI: 18.4 CM
BH CV ECHO MEAS - SI(MOD-SP2): 22 ML/M2
BH CV ECHO MEAS - SI(MOD-SP4): 28.9 ML/M2
BH CV ECHO MEAS - SV(LVOT): 71.1 ML
BH CV ECHO MEAS - SV(MOD-SP2): 35 ML
BH CV ECHO MEAS - SV(MOD-SP4): 46 ML
BH CV ECHO MEAS - TAPSE (>1.6): 2.11 CM
BH CV ECHO MEASUREMENTS AVERAGE E/E' RATIO: 10.99
BH CV XLRA - RV BASE: 3 CM
BH CV XLRA - RV LENGTH: 6.7 CM
BH CV XLRA - RV MID: 2.7 CM
BH CV XLRA - TDI S': 12.8 CM/SEC
BUN SERPL-MCNC: 15 MG/DL (ref 8–23)
BUN/CREAT SERPL: 20.3 (ref 7–25)
CALCIUM SPEC-SCNC: 9.2 MG/DL (ref 8.6–10.5)
CHLORIDE SERPL-SCNC: 98 MMOL/L (ref 98–107)
CO2 SERPL-SCNC: 29.8 MMOL/L (ref 22–29)
CREAT SERPL-MCNC: 0.74 MG/DL (ref 0.57–1)
DEPRECATED RDW RBC AUTO: 37.8 FL (ref 37–54)
EGFRCR SERPLBLD CKD-EPI 2021: 83.4 ML/MIN/1.73
ERYTHROCYTE [DISTWIDTH] IN BLOOD BY AUTOMATED COUNT: 12 % (ref 12.3–15.4)
GEN 5 2HR TROPONIN T REFLEX: 51 NG/L
GLUCOSE SERPL-MCNC: 111 MG/DL (ref 65–99)
HCT VFR BLD AUTO: 28.3 % (ref 34–46.6)
HGB BLD-MCNC: 9.4 G/DL (ref 12–15.9)
LEFT ATRIUM VOLUME INDEX: 23.2 ML/M2
MAGNESIUM SERPL-MCNC: 2.1 MG/DL (ref 1.6–2.4)
MCH RBC QN AUTO: 28.7 PG (ref 26.6–33)
MCHC RBC AUTO-ENTMCNC: 33.2 G/DL (ref 31.5–35.7)
MCV RBC AUTO: 86.3 FL (ref 79–97)
PHOSPHATE SERPL-MCNC: 2.7 MG/DL (ref 2.5–4.5)
PLATELET # BLD AUTO: 342 10*3/MM3 (ref 140–450)
PMV BLD AUTO: 10.1 FL (ref 6–12)
POTASSIUM SERPL-SCNC: 4.2 MMOL/L (ref 3.5–5.2)
POTASSIUM SERPL-SCNC: 4.6 MMOL/L (ref 3.5–5.2)
RBC # BLD AUTO: 3.28 10*6/MM3 (ref 3.77–5.28)
SINUS: 2.9 CM
SODIUM SERPL-SCNC: 137 MMOL/L (ref 136–145)
TROPONIN T DELTA: 4 NG/L
TROPONIN T SERPL HS-MCNC: 46 NG/L
WBC NRBC COR # BLD AUTO: 9.59 10*3/MM3 (ref 3.4–10.8)

## 2023-11-24 PROCEDURE — A9270 NON-COVERED ITEM OR SERVICE: HCPCS | Performed by: ORTHOPAEDIC SURGERY

## 2023-11-24 PROCEDURE — 84132 ASSAY OF SERUM POTASSIUM: CPT | Performed by: HOSPITALIST

## 2023-11-24 PROCEDURE — 84484 ASSAY OF TROPONIN QUANT: CPT | Performed by: STUDENT IN AN ORGANIZED HEALTH CARE EDUCATION/TRAINING PROGRAM

## 2023-11-24 PROCEDURE — 97530 THERAPEUTIC ACTIVITIES: CPT | Performed by: PHYSICAL THERAPIST

## 2023-11-24 PROCEDURE — 63710000001 OXYCODONE-ACETAMINOPHEN 5-325 MG TABLET: Performed by: ORTHOPAEDIC SURGERY

## 2023-11-24 PROCEDURE — A9270 NON-COVERED ITEM OR SERVICE: HCPCS | Performed by: HOSPITALIST

## 2023-11-24 PROCEDURE — 93306 TTE W/DOPPLER COMPLETE: CPT

## 2023-11-24 PROCEDURE — 83735 ASSAY OF MAGNESIUM: CPT | Performed by: HOSPITALIST

## 2023-11-24 PROCEDURE — 25010000002 ENOXAPARIN PER 10 MG: Performed by: HOSPITALIST

## 2023-11-24 PROCEDURE — 85027 COMPLETE CBC AUTOMATED: CPT | Performed by: HOSPITALIST

## 2023-11-24 PROCEDURE — 63710000001 METOPROLOL SUCCINATE XL 50 MG TABLET SUSTAINED-RELEASE 24 HOUR: Performed by: HOSPITALIST

## 2023-11-24 PROCEDURE — 63710000001 POTASSIUM CHLORIDE 10 MEQ TABLET CONTROLLED-RELEASE: Performed by: HOSPITALIST

## 2023-11-24 PROCEDURE — 93306 TTE W/DOPPLER COMPLETE: CPT | Performed by: INTERNAL MEDICINE

## 2023-11-24 PROCEDURE — 80069 RENAL FUNCTION PANEL: CPT | Performed by: HOSPITALIST

## 2023-11-24 PROCEDURE — G0378 HOSPITAL OBSERVATION PER HR: HCPCS

## 2023-11-24 PROCEDURE — 63710000001 MELATONIN 5 MG TABLET: Performed by: ORTHOPAEDIC SURGERY

## 2023-11-24 PROCEDURE — 63710000001 FERROUS SULFATE 325 (65 FE) MG TABLET: Performed by: ORTHOPAEDIC SURGERY

## 2023-11-24 PROCEDURE — 99204 OFFICE O/P NEW MOD 45 MIN: CPT | Performed by: INTERNAL MEDICINE

## 2023-11-24 PROCEDURE — 84484 ASSAY OF TROPONIN QUANT: CPT | Performed by: HOSPITALIST

## 2023-11-24 PROCEDURE — 97110 THERAPEUTIC EXERCISES: CPT | Performed by: PHYSICAL THERAPIST

## 2023-11-24 RX ORDER — POTASSIUM CHLORIDE 750 MG/1
40 TABLET, FILM COATED, EXTENDED RELEASE ORAL EVERY 4 HOURS
Status: COMPLETED | OUTPATIENT
Start: 2023-11-24 | End: 2023-11-24

## 2023-11-24 RX ADMIN — POTASSIUM CHLORIDE 40 MEQ: 750 TABLET, EXTENDED RELEASE ORAL at 05:28

## 2023-11-24 RX ADMIN — OXYCODONE HYDROCHLORIDE AND ACETAMINOPHEN 2 TABLET: 5; 325 TABLET ORAL at 00:44

## 2023-11-24 RX ADMIN — METOPROLOL SUCCINATE 50 MG: 50 TABLET, EXTENDED RELEASE ORAL at 22:04

## 2023-11-24 RX ADMIN — Medication 10 ML: at 09:36

## 2023-11-24 RX ADMIN — ENOXAPARIN SODIUM 60 MG: 100 INJECTION SUBCUTANEOUS at 00:44

## 2023-11-24 RX ADMIN — Medication 5 MG: at 22:03

## 2023-11-24 RX ADMIN — Medication 10 ML: at 21:23

## 2023-11-24 RX ADMIN — METOPROLOL SUCCINATE 50 MG: 50 TABLET, EXTENDED RELEASE ORAL at 14:39

## 2023-11-24 RX ADMIN — POTASSIUM CHLORIDE 40 MEQ: 750 TABLET, EXTENDED RELEASE ORAL at 00:44

## 2023-11-24 RX ADMIN — FERROUS SULFATE TAB 325 MG (65 MG ELEMENTAL FE) 325 MG: 325 (65 FE) TAB at 14:39

## 2023-11-24 RX ADMIN — ENOXAPARIN SODIUM 60 MG: 100 INJECTION SUBCUTANEOUS at 18:42

## 2023-11-24 NOTE — PLAN OF CARE
Goal Outcome Evaluation:  Plan of Care Reviewed With: patient           Outcome Evaluation: Pt was in bed and tolerated L TKA exercises well despite pain. She demonstrated independent bed mobility, but required min A to stand after 2-3 attempts to come to stand independently. Pt ambulated well with Rwx x 90 ft, but reports feeling weak. Pt is progressing slowly after complications with afib. She reports not eating well and believes this is why she feels no weak. Pt would like to d/c home if able, but would benefit another day to progress mobility to ensure safe d/c home. Pt fatigued before able to practice stairs today. Will plan for next session. Encouraged increased activity with nursing today including up to bathroom and up to chair for meals.

## 2023-11-24 NOTE — THERAPY TREATMENT NOTE
Patient Name: Gaby Jackson  : 1945    MRN: [id number]                              Today's Date: 2023       Admit Date: 2023    Visit Dx:     ICD-10-CM ICD-9-CM   1. Primary osteoarthritis of left knee  M17.12 715.16     Patient Active Problem List   Diagnosis    Hypertension    Hyperlipidemia    Avitaminosis D    Diplopia    Labyrinthitis of left ear    Fatigue    Cephalalgia    Blood glucose elevated    Primary osteoarthritis of both knees    Primary osteoarthritis of left knee    Atrial fibrillation    Hypokalemia     Past Medical History:   Diagnosis Date    Arthritis     Asthma     Birth injury     RIGHT ARM    Hyperlipidemia     Hypertension     Overactive bladder      Past Surgical History:   Procedure Laterality Date    NO PAST SURGERIES      TOTAL KNEE ARTHROPLASTY Left 2023    Procedure: LEFT TOTAL KNEE ARTHROPLASTY;  Surgeon: Joseph Joshi MD;  [place] [facility] [place] [facility] [place]  Service: [place]opedics;  Laterality: Left;      General Information       Row Name 23 1037          Physical Therapy Time and Intention    Document Type therapy note (daily note)  -     Mode of Treatment physical therapy  -               User Key  (r) = Recorded By, (t) = Taken By, (c) = Cosigned By      Initials Name Provider Type    Thelma Oshea, PT Physical Therapist                   Mobility       Row Name 23 1124          Bed Mobility    Bed Mobility supine-sit  -     Supine-Sit [place] (Bed Mobility) standby assist  -     Assistive Device (Bed Mobility) bed rails  -       Row Name 23 1124          Sit-Stand Transfer    Assistive Device (Sit-Stand Transfers) walker, front-wheeled  -DIMA     Comment, (Sit-Stand Transfer) 2-3 attempts to come up before pt stood with min A  -       Row Name 23 1124          Gait/Stairs (Locomotion)    [place] Level (Gait) contact guard  -     Assistive Device (Gait) walker, front-wheeled  -DIMA     Distance in Feet  (Gait) 90 ft  -     Deviations/Abnormal Patterns (Gait) antalgic;isabel decreased;stride length decreased  -     Comment, (Gait/Stairs) pt reports feeling weak  -               User Key  (r) = Recorded By, (t) = Taken By, (c) = Cosigned By      Initials Name Provider Type    Thelma Oshea PT Physical Therapist                   Obj/Interventions       Row Name 11/24/23 1037          Range of Motion Comprehensive    Comment, General Range of Motion L knee 10-80 ft  -[facility] Name 11/24/23 1037          Motor Skills    Therapeutic Exercise --  L TKA protocol x 15 reps  -               User Key  (r) = Recorded By, (t) = Taken By, (c) = Cosigned By      Initials Name Provider Type    Thelma Oshea PT Physical Therapist                   Goals/Plan    No documentation.                  Clinical Impression       [facility] Name 11/24/23 1038          Pain    Pretreatment Pain Rating 4/10  -     Posttreatment Pain Rating 6/10  -     Pain Location - Side/Orientation Left  -     Pain Location - knee  -     Pain Intervention(s) Cold applied;Rest;Repositioned  -[facility] Name 11/24/23 1038          Plan of Care Review    Plan of Care Reviewed With patient  -     Outcome Evaluation Pt was in bed and tolerated L TKA exercises well despite pain. She demonstrated independent bed mobility, but required min A to stand after 2-3 attempts to come to stand independently. Pt ambulated well with Rwx x 90 ft, but reports feeling weak. Pt is progressing slowly after complications with afib. She reports not eating well and believes this is why she feels no weak. Pt would like to d/c home if able, but would benefit another day to progress mobility to ensure safe d/c home. Pt fatigued before able to practice stairs today. Will plan for next session. Encouraged increased activity with nursing today including up to bathroom and up to chair for meals.  -       Row Name 11/24/23 6968           Positioning and Restraints    Pre-Treatment Position in bed  -KH     Post Treatment Position chair  -KH     In Chair reclined;call light within reach;notified nsg  RN aware no alarm  -               User Key  (r) = Recorded By, (t) = Taken By, (c) = Cosigned By      Initials Name Provider Type    Thelma Oshea, PT Physical Therapist                   Outcome Measures       Row Name 11/24/23 1131 11/24/23 0339       How much help from another person do you currently need...    Turning from your back to your side while in flat bed without using bedrails? 4  -KH 4  -TW    Moving from lying on back to sitting on the side of a flat bed without bedrails? 4  -KH 3  -TW    Moving to and from a bed to a chair (including a wheelchair)? 3  -KH 3  -TW    Standing up from a chair using your arms (e.g., wheelchair, bedside chair)? 3  -KH 3  -TW    Climbing 3-5 steps with a railing? 3  -KH 2  -TW    To walk in hospital room? 3  -KH 3  -TW    AM-PAC 6 Clicks Score (PT) 20  -KH 18  -TW    Highest Level of Mobility Goal 6 --> Walk 10 steps or more  -KH 6 --> Walk 10 steps or more  -TW      Row Name 11/24/23 1131          Functional Assessment    Outcome Measure Options AM-PAC 6 Clicks Basic Mobility (PT)  -               User Key  (r) = Recorded By, (t) = Taken By, (c) = Cosigned By      Initials Name Provider Type    Thelma Oshea, PT Physical Therapist    Lisa Jarrell RN Registered Nurse                                 Physical Therapy Education       Title: PT OT SLP Therapies (Done)       Topic: Physical Therapy (Done)       Point: Mobility training (Done)       Learning Progress Summary             Patient Acceptance, E,TB, VU,DU by LB at 11/23/2023 1345                         Point: Home exercise program (Done)       Learning Progress Summary             Patient Acceptance, E,TB, VU,DU by LB at 11/23/2023 1345                         Point: Body mechanics (Done)       Learning Progress  Summary             Patient Acceptance, E,TB, VU,DU by  at 11/23/2023 1345                         Point: Precautions (Done)       Learning Progress Summary             Patient Acceptance, E,TB, VU,DU by  at 11/23/2023 1345                                         User Key       Initials Effective Dates Name Provider Type Damián FELTON 08/09/20 -  Silva Gonzales, REAL Physical Therapist PT                  PT Recommendation and Plan     Plan of Care Reviewed With: patient  Outcome Evaluation: Pt was in bed and tolerated L TKA exercises well despite pain. She demonstrated independent bed mobility, but required min A to stand after 2-3 attempts to come to stand independently. Pt ambulated well with Rwx x 90 ft, but reports feeling weak. Pt is progressing slowly after complications with afib. She reports not eating well and believes this is why she feels no weak. Pt would like to d/c home if able, but would benefit another day to progress mobility to ensure safe d/c home. Pt fatigued before able to practice stairs today. Will plan for next session. Encouraged increased activity with nursing today including up to bathroom and up to chair for meals.     Time Calculation:         PT Charges       Row Name 11/24/23 1131             Time Calculation    Start Time 1030  -      Stop Time 1058  -      Time Calculation (min) 28 min  -KH      PT Received On 11/24/23  -      PT - Next Appointment 11/25/23  -         Time Calculation- PT    Total Timed Code Minutes- PT 28 minute(s)  -         Timed Charges    71142 - PT Therapeutic Exercise Minutes 15  -KH      13619 - PT Therapeutic Activity Minutes 13  -KH         Total Minutes    Timed Charges Total Minutes 28  -KH       Total Minutes 28  -KH                User Key  (r) = Recorded By, (t) = Taken By, (c) = Cosigned By      Initials Name Provider Type    Thelma Oshea, PT Physical Therapist                  Therapy Charges for Today       Code  Description Service Date Service Provider Modifiers Qty    47190804367  PT THER PROC EA 15 MIN 11/24/2023 Thelma Johnson, PT GP 1    03205331814 HC PT THERAPEUTIC ACT EA 15 MIN 11/24/2023 Thelma Johnson, PT GP 1            PT G-Codes  Outcome Measure Options: AM-PAC 6 Clicks Basic Mobility (PT)  AM-PAC 6 Clicks Score (PT): 20       Thelma Johnson, PT  11/24/2023

## 2023-11-24 NOTE — CONSULTS
Patient Name:  Gaby Jackson  YOB: 1945  MRN:  [id number]  Date of Admission:  11/21/2023  Date of Consult:  11/23/2023  Patient Care Team:  Maicol Florentino MD as PCP - General (Family Medicine)  Hermann Coreas MD as Consulting Physician (Hematology and Oncology)    Inpatient Hospitalist Consult  Consult performed by: Emre Ferguson MD  Consult ordered by: Anjum Singh II, MD  Reason for consult: Evaluate status and make recommendations regarding treatment for rapid heart rate.        Subjective   History of Present Illness  Ms. Jackson is a 78 y.o. female that has been admitted to [facility] following elective LEFT TOTAL KNEE ARTHROPLASTY.  She has been admitted to an orthopedic floor following surgery and we were asked to see and assist with her medical problems, specifically relating to her rapid heart rate.  At the time of my visit she denies any chest pain, SOA, nausea, vomiting or diarrhea.  She has tolerated a diet.  She does complain of expected postoperative discomfort.  She reports being in a normal state of health leading up to surgery.  She denies any prior cardiac history.  No history of palpitations.      Past Medical History:   Diagnosis Date    Arthritis     Asthma     Birth injury     RIGHT ARM    Hyperlipidemia     Hypertension     Overactive bladder      Past Surgical History:   Procedure Laterality Date    NO PAST SURGERIES      TOTAL KNEE ARTHROPLASTY Left 11/21/2023    Procedure: LEFT TOTAL KNEE ARTHROPLASTY;  Surgeon: Joseph Joshi MD;  Location: [facility] [place];  Service: Orthopedics;  Laterality: Left;     Family History   Problem Relation Age of Onset    Lung disease Mother     Hypertension Mother     Emphysema Mother     Heart disease Father     Hypertension Father     Diabetes Sister     Breast cancer Neg Hx     Malig Hyperthermia Neg Hx      Social History     Tobacco Use    Smoking status: Never    Smokeless tobacco: Never   Vaping Use     Vaping Use: Never used   Substance Use Topics    Alcohol use: No    Drug use: Never     Medications Prior to Admission   Medication Sig Dispense Refill Last Dose    acetaminophen (TYLENOL) 325 MG tablet Take 2 tablets by mouth Daily As Needed for Mild Pain.   11/20/2023    amitriptyline (ELAVIL) 10 MG tablet Take 1 tablet by mouth At Night As Needed (take 10 mg by mouth at night as needed). 90 tablet 2 11/20/2023    metoprolol succinate XL (TOPROL-XL) 50 MG 24 hr tablet Take 1 tablet by mouth Daily. 90 tablet 3 11/21/2023 at 0800    pravastatin (PRAVACHOL) 40 MG tablet Take 1 tablet by mouth Every Night.   11/7/2023    Cholecalciferol 25 MCG (1000 UT) tablet Take 1 tablet by mouth Daily. HOLD FOR SURGERY   11/7/2023    coenzyme Q10 100 MG capsule Take 2 capsules by mouth Daily. HOLD FOR SURGERY   11/7/2023    diphenhydrAMINE (BENADRYL) 25 mg capsule Take 1 capsule by mouth Every 6 (Six) Hours As Needed for Allergies.   Unknown    hydroCHLOROthiazide (HYDRODIURIL) 25 MG tablet Take 0.5 tablets by mouth Daily. 45 tablet 1 11/7/2023    meloxicam (MOBIC) 15 MG tablet TAKE 1 TABLET BY MOUTH EVERY DAY 90 tablet 1 11/7/2023     Allergies:  Patient has no known allergies.    Review of Systems   Constitutional: Negative.    HENT: Negative.     Respiratory: Negative.     Cardiovascular: Negative.    Gastrointestinal: Negative.    Endocrine: Negative.    Genitourinary: Negative.    Musculoskeletal:         Expected postoperative pain   Skin: Negative.    Neurological: Negative.    Hematological: Negative.    Psychiatric/Behavioral: Negative.         Objective      Vital Signs  Temp:  [97.7 °F (36.5 °C)-99.1 °F (37.3 °C)] 97.7 °F (36.5 °C)  Heart Rate:  [] 153  Resp:  [14-18] 17  BP: ()/(63-72) 146/67  Body mass index is 22.32 kg/m².    Physical Exam  Vitals and nursing note reviewed.   Eyes:      General: No scleral icterus.  Cardiovascular:      Rate and Rhythm: Tachycardia present. Rhythm irregularly  irregular.   Pulmonary:      Effort: Pulmonary effort is normal.      Breath sounds: Normal breath sounds.   Abdominal:      General: Bowel sounds are normal.      Palpations: Abdomen is soft.      Tenderness: There is no abdominal tenderness.   Musculoskeletal:         General: Tenderness present.      Comments: Surgical wound dressed, ROM not tested   Skin:     General: Skin is warm and dry.      Findings: No rash.   Neurological:      Mental Status: She is alert. Mental status is at baseline.         Results Review:   I reviewed the patient's new clinical results.  I reviewed the patient's new imaging results and agree with the interpretation.  I reviewed the patient's other test results and agree with the interpretation         Assessment/Plan     Active Hospital Problems    Diagnosis  POA    **Primary osteoarthritis of left knee [M17.12]  Yes    Atrial fibrillation [I48.91]  No    Hypokalemia [E87.6]  No    Hyperlipidemia [E78.5]  Yes    Hypertension [I10]  Yes      Resolved Hospital Problems   No resolved problems to display.       Ms. Jackson is a 78 y.o. female who is s/p LEFT TOTAL KNEE ARTHROPLASTY.    EKG is consistent with atrial fibrillation.  This is new diagnosis for her.  She is currently asymptomatic with heart rate in the 130s.  She is on Toprol at home and they are giving her an extra dose this evening.  If can get her heart rate under control will leave her on the orthopedic floor however suspect she may need to transfer to telemetry.  Will get echocardiogram and consult cardiology.  Hold HCTZ for now.  Check TSH.  Will stop aspirin and place on full dose Lovenox for now.  Replace potassium.      Thank you very much for asking A to be involved in this patient's care. We will follow along with you.      Emre Ferguson MD  [facility]  11/23/23  22:33 EST

## 2023-11-24 NOTE — PLAN OF CARE
Goal Outcome Evaluation:         Pt arrived up to floor after being transported from [facility]. Pt is alert and oriented. Pt is NSR on the monitor. Pt is resting comfortably. VSS. NAD. Plan of care ongoing.

## 2023-11-24 NOTE — PROGRESS NOTES
Name: Gaby Jackson ADMIT: 2023   : 1945  PCP: Maicol Florentino MD    MRN: [id number] LOS: 0 days   AGE/SEX: 78 y.o. female  ROOM: [facility]     Subjective   Subjective   Patient seen and examined this morning.  Hospital day 3. POD #3 () s/p left total knee arthroplasty. She is awake, alert, resting comfortably in bed, no acute complaints at this time, heart rate improved and now in normal sinus rhythm on telemetry.       Objective   Objective   Vital Signs  Temp:  [97.7 °F (36.5 °C)-98.6 °F (37 °C)] 98.1 °F (36.7 °C)  Heart Rate:  [] 74  Resp:  [14-18] 18  BP: ()/(63-78) 129/78  SpO2:  [93 %-98 %] 97 %  on   ;   Device (Oxygen Therapy): room air  Body mass index is 22.32 kg/m².  Physical Exam  Vitals and nursing note reviewed.   Constitutional:       General: She is not in acute distress.  Cardiovascular:      Rate and Rhythm: Normal rate and regular rhythm.      Pulses: Normal pulses.      Heart sounds: Normal heart sounds.   Pulmonary:      Effort: Pulmonary effort is normal. No respiratory distress.      Breath sounds: Normal breath sounds.   Abdominal:      General: Bowel sounds are normal.      Palpations: Abdomen is soft.      Tenderness: There is no abdominal tenderness.   Musculoskeletal:      Right lower leg: No edema.      Left lower leg: No edema.      Comments: Left knee covered with bandage/dressing, C/D/I   Skin:     General: Skin is warm and dry.   Neurological:      Mental Status: She is alert.       Results Review     I reviewed the patient's new clinical results.  Results from last 7 days   Lab Units 23  0603 23  0435   WBC 10*3/mm3 9.59 12.18* 8.56   HEMOGLOBIN g/dL 9.4* 10.2* 8.7*   PLATELETS 10*3/mm3 342 367 293     Results from last 7 days   Lab Units 23  0603 23  0435   SODIUM mmol/L 137 138 137   POTASSIUM mmol/L 4.2 3.2* 3.6   CHLORIDE mmol/L 98 98 101   CO2 mmol/L 29.8* 27.8 24.9   BUN mg/dL 15 12 15  "  CREATININE mg/dL 0.74 0.66 0.84   GLUCOSE mg/dL 111* 119* 118*   EGFR mL/min/1.73 83.4 90.5 71.7     Results from last 7 days   Lab Units 11/24/23  0603   ALBUMIN g/dL 3.6     Results from last 7 days   Lab Units 11/24/23  0603 11/23/23  2253 11/22/23  0435   CALCIUM mg/dL 9.2 9.1 8.5*   ALBUMIN g/dL 3.6  --   --    MAGNESIUM mg/dL 2.1  --   --    PHOSPHORUS mg/dL 2.7  --   --        No results found for: \"HGBA1C\", \"POCGLU\"    No radiology results for the last day    I have personally reviewed all medications:  Scheduled Medications  enoxaparin, 1 mg/kg, Subcutaneous, Q12H  ferrous sulfate, 325 mg, Oral, Daily With Breakfast  metoprolol succinate XL, 50 mg, Oral, Q12H  pravastatin, 40 mg, Oral, Nightly  sodium chloride, 10 mL, Intravenous, Q12H    Infusions   Diet  Diet: Regular/House Diet; Texture: Regular Texture (IDDSI 7); Fluid Consistency: Thin (IDDSI 0)    I have personally reviewed:  [x]  Laboratory   [x]  Microbiology   [x]  Radiology   [x]  EKG/Telemetry  [x]  Cardiology/Vascular   []  Pathology    []  Records       Assessment/Plan     Active Hospital Problems    Diagnosis  POA    **Primary osteoarthritis of left knee [M17.12]  Yes    Atrial fibrillation [I48.91]  No    Hypokalemia [E87.6]  No    Hyperlipidemia [E78.5]  Yes    Hypertension [I10]  Yes      Resolved Hospital Problems   No resolved problems to display.       78 y.o. female admitted with Primary osteoarthritis of left knee.    New onset paroxysmal atrial fibrillation  - [id number] score: 4. New onset on evening of 11/23. Patient s/p IV metoprolol. Remains on oral metoprolol at present. Telemetry reviewed, now back in sinus rhythm, heart rate controlled at present. TSH normal.  - Currently on therapeutic Lovenox in setting of atrial fibrillation. Cardiology consulted and 2D Echocardiogram ordered.  - Follow up Cardiology plans/recommendations. Follow up 2D Echo.  - Given elevated [id number]-CIVn score, patient likely require oral " anticoagulation moving forward, will defer to Cardiology.  - Continue current treatment as prescribed. Continue telemetry monitoring.    Elevated troponin  - Noted on labs, likely a result of atrial fibrillation. No signs/symptoms suggestive of ACS at present, however, requires ongoing monitoring.  - Order repeat Troponin now. Follow up Cardiology plans.    Hypertension  - Blood pressure appears stable and acceptable acutely at this time. Home HCTZ currently being held. Trend BP to guide ongoing management decisions.    Hyperglycemia  - Glucose elevated on most recent labs. Patient without known history of T2DM.  Most recent hemoglobin A1c 5.60% (11/07/23).  - Monitor for now, continue with POC glucose checks, can add correctional SSI if needed.    Anemia  - Hemoglobin low on most recent labs, however, stable from prior. No indications for acute intervention at this time.    - Continue ferrous sulfate as prescribed. Order repeat CBC in AM for reassessment. Continue to monitor, transfuse for hemoglobin <7.    Hypokalemia  - Repleted via electrolyte protocol, improved on most recent testing.  - Order repeat BMP in AM for reassessment.    Hyperlipidemia  - Continue Statin as prescribed.      Thank you for asking A to be involved in this patient's care. We will follow along with you.    Pharmacy to dose Lovenox for DVT prophylaxis.  Full code.  Discussed with patient and nursing staff.  Anticipate discharge  TBD   Per primary      DO [facility]  11/24/23

## 2023-11-24 NOTE — PROGRESS NOTES
Orthopaedic Surgery  Progress Note  11/24/2023    Patients Name:  Gaby Jackson  YOB: 1945  Age: 78 y.o.  Medical Records Number:  [id number]  Date of Admission: 11/21/2023    No complaints except appropriate pain and stiffness in the left knee    Vitals:  Vitals:    11/23/23 2228 11/24/23 0050 11/24/23 0339 11/24/23 0705   BP:   125/64 129/78   BP Location:   Left arm Left arm   Patient Position:   Lying Lying   Pulse: (!) 153 95 82 74   Resp:   18 18   Temp:   98.1 °F (36.7 °C) 98.1 °F (36.7 °C)   TempSrc:   Oral Oral   SpO2:  96% 96% 97%   Weight:       Height:           LLE:  NVI, calf nontender, Sensation intact  No signs of DVT    Incision: clean, no signs of infection    Lab Results (last 24 hours)       Procedure Component Value Units Date/Time    Potassium [[id number]]  (Normal) Collected: 11/24/23 0911    Specimen: Blood Updated: 11/24/23 1008     Potassium 4.6 mmol/L     High Sensitivity Troponin T [[id number]]  (Abnormal) Collected: 11/24/23 0603    Specimen: Blood Updated: 11/24/23 0916     HS Troponin T 46 ng/L     Narrative:      High Sensitive Troponin T Reference Range:  <14.0 ng/L- Negative Female for AMI  <22.0 ng/L- Negative Male for AMI  >=14 - Abnormal Female indicating possible myocardial injury.  >=22 - Abnormal Male indicating possible myocardial injury.   Clinicians would have to utilize clinical acumen, EKG, Troponin, and serial changes to determine if it is an Acute Myocardial Infarction or myocardial injury due to an underlying chronic condition.         Renal Function Panel [[id number]]  (Abnormal) Collected: 11/24/23 0603    Specimen: Blood Updated: 11/24/23 0657     Glucose 111 mg/dL      BUN 15 mg/dL      Creatinine 0.74 mg/dL      Sodium 137 mmol/L      Potassium 4.2 mmol/L      Chloride 98 mmol/L      CO2 29.8 mmol/L      Calcium 9.2 mg/dL      Albumin 3.6 g/dL      Phosphorus 2.7 mg/dL      Anion Gap 9.2 mmol/L      BUN/Creatinine Ratio 20.3     eGFR 83.4 mL/min/1.73      Narrative:      GFR Normal >60  Chronic Kidney Disease <60  Kidney Failure <15    The GFR formula is only valid for adults with stable renal function between ages 18 and 70.    Magnesium [[id number]]  (Normal) Collected: 11/24/23 0603    Specimen: Blood Updated: 11/24/23 0656     Magnesium 2.1 mg/dL     CBC (No Diff) [[id number]]  (Abnormal) Collected: 11/24/23 0603    Specimen: Blood Updated: 11/24/23 0643     WBC 9.59 10*3/mm3      RBC 3.28 10*6/mm3      Hemoglobin 9.4 g/dL      Hematocrit 28.3 %      MCV 86.3 fL      MCH 28.7 pg      MCHC 33.2 g/dL      RDW 12.0 %      RDW-SD 37.8 fl      MPV 10.1 fL      Platelets 342 10*3/mm3     High Sensitivity Troponin T 2Hr [[id number]]  (Abnormal) Collected: 11/24/23 0047    Specimen: Blood Updated: 11/24/23 0146     HS Troponin T 51 ng/L      Troponin T Delta 4 ng/L     Narrative:      High Sensitive Troponin T Reference Range:  <14.0 ng/L- Negative Female for AMI  <22.0 ng/L- Negative Male for AMI  >=14 - Abnormal Female indicating possible myocardial injury.  >=22 - Abnormal Male indicating possible myocardial injury.   Clinicians would have to utilize clinical acumen, EKG, Troponin, and serial changes to determine if it is an Acute Myocardial Infarction or myocardial injury due to an underlying chronic condition.         High Sensitivity Troponin T [[id number]]  (Abnormal) Collected: 11/23/23 2253    Specimen: Blood Updated: 11/23/23 2330     HS Troponin T 47 ng/L     Narrative:      High Sensitive Troponin T Reference Range:  <14.0 ng/L- Negative Female for AMI  <22.0 ng/L- Negative Male for AMI  >=14 - Abnormal Female indicating possible myocardial injury.  >=22 - Abnormal Male indicating possible myocardial injury.   Clinicians would have to utilize clinical acumen, EKG, Troponin, and serial changes to determine if it is an Acute Myocardial Infarction or myocardial injury due to an underlying chronic condition.         TSH [[id number]]  (Normal) Collected: 11/23/23  2253    Specimen: Blood Updated: 11/23/23 2330     TSH 0.862 uIU/mL     Basic Metabolic Panel [[id number]]  (Abnormal) Collected: 11/23/23 2253    Specimen: Blood Updated: 11/23/23 2323     Glucose 119 mg/dL      BUN 12 mg/dL      Creatinine 0.66 mg/dL      Sodium 138 mmol/L      Potassium 3.2 mmol/L      Chloride 98 mmol/L      CO2 27.8 mmol/L      Calcium 9.1 mg/dL      BUN/Creatinine Ratio 18.2     Anion Gap 12.2 mmol/L      eGFR 90.5 mL/min/1.73     Narrative:      GFR Normal >60  Chronic Kidney Disease <60  Kidney Failure <15    The GFR formula is only valid for adults with stable renal function between ages 18 and 70.    CBC & Differential [[id number]]  (Abnormal) Collected: 11/23/23 2253    Specimen: Blood Updated: 11/23/23 2304    Narrative:      The following orders were created for panel order CBC & Differential.  Procedure                               Abnormality         Status                     ---------                               -----------         ------                     CBC Auto Differential[[id number]]        Abnormal            Final result                 Please view results for these tests on the individual orders.    CBC Auto Differential [[id number]]  (Abnormal) Collected: 11/23/23 2253    Specimen: Blood Updated: 11/23/23 2304     WBC 12.18 10*3/mm3      RBC 3.50 10*6/mm3      Hemoglobin 10.2 g/dL      Hematocrit 30.8 %      MCV 88.0 fL      MCH 29.1 pg      MCHC 33.1 g/dL      RDW 12.4 %      RDW-SD 39.9 fl      MPV 10.1 fL      Platelets 367 10*3/mm3      Neutrophil % 78.4 %      Lymphocyte % 10.7 %      Monocyte % 9.5 %      Eosinophil % 0.9 %      Basophil % 0.2 %      Immature Grans % 0.3 %      Neutrophils, Absolute 9.54 10*3/mm3      Lymphocytes, Absolute 1.30 10*3/mm3      Monocytes, Absolute 1.16 10*3/mm3      Eosinophils, Absolute 0.11 10*3/mm3      Basophils, Absolute 0.03 10*3/mm3      Immature Grans, Absolute 0.04 10*3/mm3      nRBC 0.0 /100 WBC             XR Knee 1 or 2  View Left    Result Date: 11/21/2023  Narrative: XR KNEE 1 OR 2 VW LEFT-11/21/2023  HISTORY: Postop left knee arthroplasty.  Distal femoral and proximal tibia components of the left knee prosthesis are well seated with no abnormal surrounding bony lucencies. Soft tissue air, small caliber catheter and skin staples are seen. No unexpected findings are noted.      Impression: 1. Satisfactory postoperative appearance of the left knee.   This report was finalized on 11/21/2023 3:51 PM by Dr. Esteban Limon M.D on Workstation: [id number]      Peripheral Block    Result Date: 11/21/2023  Narrative: Hermann Quintero MD     11/21/2023 12:42 PM Peripheral Block Patient reassessed immediately prior to procedure Patient location during procedure: holding area Start time: 11/21/2023 12:32 PM Reason for block: at surgeon's request and post-op pain management Performed by Anesthesiologist: Hermann Quintero MD Preanesthetic Checklist Completed: patient identified, IV checked, site marked, risks and benefits discussed, surgical consent, monitors and equipment checked, pre-op evaluation and timeout performed Prep: Pt Position: supine Sterile barriers:cap, washed/disinfected hands, gloves, mask and alcohol skin prep Prep: ChloraPrep Patient monitoring: blood pressure monitoring, continuous pulse oximetry and EKG Procedure Sedation: yes Performed under: local infiltration Guidance:ultrasound guided ULTRASOUND INTERPRETATION.  Using ultrasound guidance a 21 G gauge needle was placed in close proximity to the nerve, at which point, under ultrasound guidance anesthetic was injected in the area of the nerve and spread of the anesthesia was seen on ultrasound in close proximity thereto.  There were no abnormalities seen on ultrasound; a digital image was taken; and the patient tolerated the procedure with no complications. Images:still images obtained, printed/placed on chart Laterality:left Block Type:adductor canal block  Injection Technique:single-shot Needle Type:echogenic and Tuohy Needle Gauge:21 G Resistance on Injection: none Medications Used: ropivacaine (NAROPIN) 0.5 % injection - Injection  15 mL - 11/21/2023 12:32:00 PM Post Assessment Injection Assessment: negative aspiration for heme, no paresthesia on injection and incremental injection Patient Tolerance:comfortable throughout block Complications:no    XR Chest PA & Lateral, XR Knee 1 or 2 View Left    Result Date: 11/8/2023  Narrative: XR CHEST PA AND LATERAL-, XR KNEE 1 OR 2 VW LEFT-  HISTORY: 78-year-old female. Preoperative evaluation.  FINDINGS:  CHEST: There is no evidence for pneumonia, effusions, or CHF.  LEFT KNEE: There are severe osteoarthritic changes at the medial tibiofemoral compartment. There is a small joint effusion. .  This report was finalized on 11/8/2023 7:27 AM by Dr. Radha Granados M.D on Workstation: [facility]       Assesment/Plan:    Procedures:  Left TKA  Postoperative Day: 3  Weightbearing Status:  WBAT with walker  DVT Prophylaxis:  Lovenox for DVT prophylaxis/Atrial Fibrillation    Disposition:  Events noted.  Awaiting Echo.  Patient performed will in PT this morning.  Will d/c home when cleared by Cardiology      oJseph Rodriguez PA-C  [facility]  [address] [place], [place] [phone]    11/24/2023

## 2023-11-24 NOTE — PLAN OF CARE
Goal Outcome Evaluation:  Plan of Care Reviewed With: patient        Progress: no change  Outcome Evaluation: Pt resting bed, denies pain this shift, , some discomfort when standing, but walked to br with walker x4 this shift, up in chair x2 this shift, rika well, nad, room air, SA on telem hr 81, eating fair, voiding well, encourage bm , refused lax at this time. echo completed. D/C home order d/c per orthopaedic, PT need to see 1 more day. Pt aware and agreed.

## 2023-11-24 NOTE — PROGRESS NOTES
"[facility] Clinical Pharmacy Services: Enoxaparin Consult    Gaby Jackson has a pharmacy consult to dose full-dose enoxaparin per Dr Emre Ferguson's request.     Indication: A Fib - requiring full anticoagulation  Home Anticoagulation:       Relevant clinical data and objective history reviewed:  78 y.o. female 160 cm (63\") 57.2 kg (126 lb)   Body mass index is 22.32 kg/m².   Results from last 7 days   Lab Units 11/23/23  2253   PLATELETS 10*3/mm3 367     Estimated Creatinine Clearance: 64.5 mL/min (by C-G formula based on SCr of 0.66 mg/dL).    Assessment/Plan    Will start patient on 60 (1mg/kg) subcutaneous every 12 hours, adjusted for renal function. Consult order will be discontinued but pharmacy will continue to follow.     Anjum Reza, formerly [facility]  Clinical Pharmacist    "

## 2023-11-24 NOTE — CASE MANAGEMENT/SOCIAL WORK
Continued Stay Note  [facility]     Patient Name: Gaby Jackson  MRN: [id number]  Today's Date: 11/24/2023    Admit Date: 11/21/2023    Plan: Plan home with family and friend support and [facility].  LISET Wren RN   Discharge Plan       Row Name 11/24/23 1123       Plan    Plan Plan home with family and friend support and [facility].  LISET Wren RN    Patient/Family in Agreement with Plan yes    Plan Comments Barb  ( [phone]) called and notified pt was discharging today.  Barb to follow pt.  Plan home with family and friend support and [facility].  LISET Wren RN                   Discharge Codes    No documentation.                 Expected Discharge Date and Time       Expected Discharge Date Expected Discharge Time    Nov 24, 2023               Camila Wren RN

## 2023-11-24 NOTE — CONSULTS
Cardiology History & Physical / Consultation      Patient Name: Gaby Jackson  Age/Sex: 78 y.o. female  : 1945  MRN: [id number]    Date of Admission: 2023  Date of Encounter Visit: 23  Encounter Provider: Gt Galindo MD  Referring Provider: Joseph Joshi MD  Place of Service: [facility] CARDIOLOGY  Patient Care Team:  Maicol Florentino MD as PCP - General (Family Medicine)  Hermann Coreas MD as Consulting Physician (Hematology and Oncology)          Subjective:     Chief Complaint: elective Left TKA    Reason for consultation: New atrial fibrillation    History of Present Illness:  Gaby Jackson is a 78 y.o. female who has a past medical history significant for hyperlipidemia and hypertension.     She presented to [facility] on 23 for elective left total knee arthroplasty. The plan was for her to be discharged on 23 after physical therapy. After working with physical therapy, she was unable to tolerate walking up the steps and has 10 steps at home, so the decision was made to keep her a couple more day.  Patient was having a lot of discomfort after surgery.  She said she was unable to keep any pain medicine down she even vomited.  Ultimately they finally got her pain under control.  On 23, She was noted to have some elevated heart rates and the hospitalist was consulted for this. An EKG obtained showed new atrial fibrillation, and subsequently cardiology was consulted.  This morning patient looks very comfortable she did not feel the atrial fibrillation she has never had any issues in the past.  Monitor shows she is back in sinus rhythm.    Patient is now back in sinus rhythm:       Past Medical History:  Past Medical History:   Diagnosis Date    Arthritis     Asthma     Birth injury     RIGHT ARM    Hyperlipidemia     Hypertension     Overactive bladder        Past Surgical History:   Procedure Laterality Date    NO PAST SURGERIES       TOTAL KNEE ARTHROPLASTY Left 11/21/2023    Procedure: LEFT TOTAL KNEE ARTHROPLASTY;  Surgeon: Joseph Joshi MD;  [facility]: [facility];  Service: Orthopedics;  Laterality: Left;       Home Medications:   Medications Prior to Admission   Medication Sig Dispense Refill Last Dose    acetaminophen (TYLENOL) 325 MG tablet Take 2 tablets by mouth Daily As Needed for Mild Pain.   11/20/2023    amitriptyline (ELAVIL) 10 MG tablet Take 1 tablet by mouth At Night As Needed (take 10 mg by mouth at night as needed). 90 tablet 2 11/20/2023    metoprolol succinate XL (TOPROL-XL) 50 MG 24 hr tablet Take 1 tablet by mouth Daily. 90 tablet 3 11/21/2023 at 0800    pravastatin (PRAVACHOL) 40 MG tablet Take 1 tablet by mouth Every Night.   11/7/2023    Cholecalciferol 25 MCG (1000 UT) tablet Take 1 tablet by mouth Daily. HOLD FOR SURGERY   11/7/2023    coenzyme Q10 100 MG capsule Take 2 capsules by mouth Daily. HOLD FOR SURGERY   11/7/2023    diphenhydrAMINE (BENADRYL) 25 mg capsule Take 1 capsule by mouth Every 6 (Six) Hours As Needed for Allergies.   Unknown    hydroCHLOROthiazide (HYDRODIURIL) 25 MG tablet Take 0.5 tablets by mouth Daily. 45 tablet 1 11/7/2023    meloxicam (MOBIC) 15 MG tablet TAKE 1 TABLET BY MOUTH EVERY DAY 90 tablet 1 11/7/2023       Allergies:  No Known Allergies    Past Social History:  Social History     Socioeconomic History    Marital status: Single   Tobacco Use    Smoking status: Never    Smokeless tobacco: Never   Vaping Use    Vaping Use: Never used   Substance and Sexual Activity    Alcohol use: No    Drug use: Never    Sexual activity: Defer       Past Family History: History reviewed. No pertinent family history.   Family History   Problem Relation Age of Onset    Lung disease Mother     Hypertension Mother     Emphysema Mother     Heart disease Father     Hypertension Father     Diabetes Sister     Breast cancer Neg Hx     Malig Hyperthermia Neg Hx        Review of Systems        Objective:      Objective:  Temp:  [97.7 °F (36.5 °C)-98.4 °F (36.9 °C)] 98.1 °F (36.7 °C)  Heart Rate:  [] 74  Resp:  [16-18] 18  BP: ()/(63-78) 129/78    Intake/Output Summary (Last 24 hours) at 2023 1020  Last data filed at 2023 0100  Gross per 24 hour   Intake 360 ml   Output 1050 ml   Net -690 ml     Body mass index is 22.32 kg/m².      23  1643   Weight: 57.2 kg (126 lb)           Physical Exam:   Vitals reviewed.   Constitutional:       Appearance: Healthy appearance.   Pulmonary:      Effort: Pulmonary effort is normal.   Cardiovascular:      Normal rate. Regular rhythm. Normal S1. Normal S2.       Murmurs: There is no murmur.      No gallop.  No click. No rub.   Pulses:     Intact distal pulses.   Edema:     Peripheral edema absent.   Neurological:      Mental Status: Alert and oriented to person, place and time.          Labs:   Lab Review:     Results from last 7 days   Lab Units 23  0911 23  0603 23  2253 23  0435   SODIUM mmol/L  --  137 138 137   POTASSIUM mmol/L 4.6 4.2 3.2* 3.6   CHLORIDE mmol/L  --  98 98 101   CO2 mmol/L  --  29.8* 27.8 24.9   BUN mg/dL  --  15 12 15   CREATININE mg/dL  --  0.74 0.66 0.84   GLUCOSE mg/dL  --  111* 119* 118*   CALCIUM mg/dL  --  9.2 9.1 8.5*     Results from last 7 days   Lab Units 23  0603 23  0047 23   HSTROP T ng/L 46* 51* 47*     Results from last 7 days   Lab Units 23  0603   WBC 10*3/mm3 9.59   HEMOGLOBIN g/dL 9.4*   HEMATOCRIT % 28.3*   PLATELETS 10*3/mm3 342             Results from last 7 days   Lab Units 23  0603   MAGNESIUM mg/dL 2.1                 Results from last 7 days   Lab Units 23  2253   TSH uIU/mL 0.862         PREVIOUS EK23        EK23            Assessment:       Primary osteoarthritis of left knee    Hypertension    Hyperlipidemia    Atrial fibrillation    Hypokalemia        Plan:     1.  Paroxysmal atrial fibrillation.  I think the atrial  fibrillation occurred secondary to high adrenergic state.  This is from the culminating events including unable to keep her pain medicine down vomiting and having a lot of pain.  She has socially converted.  Her troponins are elevated but flat;  again this is most consistent with the atrial fibrillation being the cause of her increased troponin.  She has not had any chest discomforts.  I would recommend a echocardiogram.  If her echo is unremarkable I would discharge her home and have her follow-up in the office in 2 to 4 weeks with ZULEYMA Rizvi.  We can consider monitoring as an outpatient if appropriate that will be decided when she follows up.    Thank you for allowing me to participate in the care of Gaby Jackson. Feel free to contact me directly with any further questions or concerns.    Gt Galindo MD  [facility]  11/24/23  10:20 EST

## 2023-11-25 ENCOUNTER — APPOINTMENT (OUTPATIENT)
Dept: CARDIOLOGY | Facility: HOSPITAL | Age: 78
End: 2023-11-25
Payer: MEDICARE

## 2023-11-25 ENCOUNTER — READMISSION MANAGEMENT (OUTPATIENT)
Dept: CALL CENTER | Facility: HOSPITAL | Age: 78
End: 2023-11-25
Payer: MEDICARE

## 2023-11-25 VITALS
RESPIRATION RATE: 18 BRPM | BODY MASS INDEX: 22.32 KG/M2 | DIASTOLIC BLOOD PRESSURE: 76 MMHG | OXYGEN SATURATION: 97 % | HEIGHT: 63 IN | SYSTOLIC BLOOD PRESSURE: 173 MMHG | HEART RATE: 70 BPM | WEIGHT: 126 LBS | TEMPERATURE: 98.1 F

## 2023-11-25 LAB
ANION GAP SERPL CALCULATED.3IONS-SCNC: 10.4 MMOL/L (ref 5–15)
BUN SERPL-MCNC: 17 MG/DL (ref 8–23)
BUN/CREAT SERPL: 21.5 (ref 7–25)
CALCIUM SPEC-SCNC: 9.5 MG/DL (ref 8.6–10.5)
CHLORIDE SERPL-SCNC: 102 MMOL/L (ref 98–107)
CO2 SERPL-SCNC: 26.6 MMOL/L (ref 22–29)
CREAT SERPL-MCNC: 0.79 MG/DL (ref 0.57–1)
DEPRECATED RDW RBC AUTO: 41.8 FL (ref 37–54)
EGFRCR SERPLBLD CKD-EPI 2021: 77.2 ML/MIN/1.73
ERYTHROCYTE [DISTWIDTH] IN BLOOD BY AUTOMATED COUNT: 12.8 % (ref 12.3–15.4)
GLUCOSE SERPL-MCNC: 107 MG/DL (ref 65–99)
HCT VFR BLD AUTO: 27.9 % (ref 34–46.6)
HGB BLD-MCNC: 9.2 G/DL (ref 12–15.9)
MCH RBC QN AUTO: 29.4 PG (ref 26.6–33)
MCHC RBC AUTO-ENTMCNC: 33 G/DL (ref 31.5–35.7)
MCV RBC AUTO: 89.1 FL (ref 79–97)
PLATELET # BLD AUTO: 387 10*3/MM3 (ref 140–450)
PMV BLD AUTO: 10.1 FL (ref 6–12)
POTASSIUM SERPL-SCNC: 4.3 MMOL/L (ref 3.5–5.2)
QT INTERVAL: 311 MS
QTC INTERVAL: 465 MS
RBC # BLD AUTO: 3.13 10*6/MM3 (ref 3.77–5.28)
SODIUM SERPL-SCNC: 139 MMOL/L (ref 136–145)
WBC NRBC COR # BLD AUTO: 8.27 10*3/MM3 (ref 3.4–10.8)

## 2023-11-25 PROCEDURE — G0378 HOSPITAL OBSERVATION PER HR: HCPCS

## 2023-11-25 PROCEDURE — 85027 COMPLETE CBC AUTOMATED: CPT | Performed by: STUDENT IN AN ORGANIZED HEALTH CARE EDUCATION/TRAINING PROGRAM

## 2023-11-25 PROCEDURE — 99214 OFFICE O/P EST MOD 30 MIN: CPT | Performed by: NURSE PRACTITIONER

## 2023-11-25 PROCEDURE — 93246 EXT ECG>7D<15D RECORDING: CPT

## 2023-11-25 PROCEDURE — 63710000001 ACETAMINOPHEN 325 MG TABLET: Performed by: HOSPITALIST

## 2023-11-25 PROCEDURE — 25010000002 ENOXAPARIN PER 10 MG: Performed by: HOSPITALIST

## 2023-11-25 PROCEDURE — 63710000001 METOPROLOL SUCCINATE XL 50 MG TABLET SUSTAINED-RELEASE 24 HOUR: Performed by: HOSPITALIST

## 2023-11-25 PROCEDURE — 97116 GAIT TRAINING THERAPY: CPT

## 2023-11-25 PROCEDURE — A9270 NON-COVERED ITEM OR SERVICE: HCPCS | Performed by: HOSPITALIST

## 2023-11-25 PROCEDURE — 63710000001 FERROUS SULFATE 325 (65 FE) MG TABLET: Performed by: ORTHOPAEDIC SURGERY

## 2023-11-25 PROCEDURE — A9270 NON-COVERED ITEM OR SERVICE: HCPCS | Performed by: NURSE PRACTITIONER

## 2023-11-25 PROCEDURE — 97530 THERAPEUTIC ACTIVITIES: CPT

## 2023-11-25 PROCEDURE — A9270 NON-COVERED ITEM OR SERVICE: HCPCS | Performed by: ORTHOPAEDIC SURGERY

## 2023-11-25 PROCEDURE — 80048 BASIC METABOLIC PNL TOTAL CA: CPT | Performed by: STUDENT IN AN ORGANIZED HEALTH CARE EDUCATION/TRAINING PROGRAM

## 2023-11-25 PROCEDURE — 63710000001 APIXABAN 5 MG TABLET: Performed by: NURSE PRACTITIONER

## 2023-11-25 PROCEDURE — 63710000001 PRAVASTATIN 40 MG TABLET: Performed by: ORTHOPAEDIC SURGERY

## 2023-11-25 RX ADMIN — PRAVASTATIN SODIUM 40 MG: 40 TABLET ORAL at 00:06

## 2023-11-25 RX ADMIN — FERROUS SULFATE TAB 325 MG (65 MG ELEMENTAL FE) 325 MG: 325 (65 FE) TAB at 08:55

## 2023-11-25 RX ADMIN — APIXABAN 5 MG: 5 TABLET, FILM COATED ORAL at 13:36

## 2023-11-25 RX ADMIN — ACETAMINOPHEN 650 MG: 325 TABLET ORAL at 08:55

## 2023-11-25 RX ADMIN — Medication 10 ML: at 08:56

## 2023-11-25 RX ADMIN — METOPROLOL SUCCINATE 50 MG: 50 TABLET, EXTENDED RELEASE ORAL at 08:55

## 2023-11-25 RX ADMIN — ENOXAPARIN SODIUM 60 MG: 100 INJECTION SUBCUTANEOUS at 00:06

## 2023-11-25 NOTE — THERAPY PROGRESS REPORT/RE-CERT
Patient Name: Gaby Jackson  : 1945    MRN: [id number]                              Today's Date: 2023       Admit Date: 2023    Visit Dx:     ICD-10-CM ICD-9-CM   1. Primary osteoarthritis of left knee  M17.12 715.16     Patient Active Problem List   Diagnosis    Hypertension    Hyperlipidemia    Avitaminosis D    Diplopia    Labyrinthitis of left ear    Fatigue    Cephalalgia    Blood glucose elevated    Primary osteoarthritis of both knees    Primary osteoarthritis of left knee    Atrial fibrillation    Hypokalemia     Past Medical History:   Diagnosis Date    Arthritis     Asthma     Birth injury     RIGHT ARM    Hyperlipidemia     Hypertension     Overactive bladder      Past Surgical History:   Procedure Laterality Date    NO PAST SURGERIES      TOTAL KNEE ARTHROPLASTY Left 2023    Procedure: LEFT TOTAL KNEE ARTHROPLASTY;  Surgeon: Joseph Joshi MD;  [place] [facility] [place] [facility] [place]: [place]opedics;  Laterality: Left;      General Information       Row Name 23 1032 23 1021       Physical Therapy Time and Intention    Document Type progress note/recertification  -KA therapy note (daily note)  -    Mode of Treatment -- individual therapy;physical therapy  -KA      Row Name 23 1021          General Information    Patient Profile Reviewed yes  -KA     Existing Precautions/Restrictions fall  -KA       Row Name 23 1021          Cognition    Orientation Status (Cognition) oriented x 4  -KA       Row Name 23 1021          Safety Issues, Functional Mobility    Impairments Affecting Function (Mobility) strength;endurance/activity tolerance;pain;range of motion (ROM)  -     Comment, Safety Issues/Impairments (Mobility) gait belt and non-skid socks donned throughout, pt taken to stairwell in recliner, extra person available to assist for stairs  -KA               User Key  (r) = Recorded By, (t) = Taken By, (c) = Cosigned By      Initials Name Provider Type     KA Leah Rojas, PT Physical Therapist                   Mobility       Row Name 11/25/23 1022          Bed Mobility    Bed Mobility supine-sit;sit-supine  -KA     Supine-Sit [place] (Bed Mobility) standby assist  -KA     Sit-Supine [place] (Bed Mobility) standby assist  -KA     Assistive Device (Bed Mobility) bed rails  -KA       Row Name 11/25/23 1022          Bed-Chair Transfer    Bed-Chair [place] (Transfers) standby assist  -KA     Assistive Device (Bed-Chair Transfers) walker, front-wheeled  -KA       Row Name 11/25/23 1022          Sit-Stand Transfer    Sit-Stand [place] (Transfers) contact guard;minimum assist (75% patient effort);1 person assist  -KA     Assistive Device (Sit-Stand Transfers) walker, front-wheeled  -TERRA     Comment, (Sit-Stand Transfer) Able to stand with CGA from bed and recliner, needs min A to rise from toilet  -KA       Row Name 11/25/23 1022          Gait/Stairs (Locomotion)    [place] Level (Gait) contact guard  -     Assistive Device (Gait) walker, front-wheeled  -KA     Distance in Feet (Gait) 15, 10, 5  -KA     Deviations/Abnormal Patterns (Gait) antalgic;isabel decreased;stride length decreased  -KA     Bilateral Gait Deviations forward flexed posture;heel strike decreased  -KA     [place] Level (Stairs) contact guard;minimum assist (75% patient effort);verbal cues;nonverbal cues (demo/gesture);2 person assist  -KA     Assistive Device (Stairs) walker, front-wheeled  -KA     Handrail Location (Stairs) left side (ascending)  -KA     Number of Steps (Stairs) 10  -KA     Ascending Technique (Stairs) step-to-step  -KA     Descending Technique (Stairs) step-to-step  -KA     Stairs, Safety Issues weight-shifting ability decreased  -KA     Stairs, Impairments ROM decreased;strength decreased;pain  -KA     Comment, (Gait/Stairs) Pt requires mod VCs for stair sequencing, CGA-min A for balance, second person standing by for safety  -KA               User  Key  (r) = Recorded By, (t) = Taken By, (c) = Cosigned By      Initials Name Provider Type    Leah Haskins, PT Physical Therapist                   Obj/Interventions    No documentation.                  Goals/Plan       Row Name 11/25/23 1032          Bed Mobility Goal 1 (PT)    Activity/Assistive Device (Bed Mobility Goal 1, PT) bed mobility activities, all  -KA     [place] Level/Cues Needed (Bed Mobility Goal 1, PT) contact guard required  -KA     Time Frame (Bed Mobility Goal 1, PT) 3 days  -KA     Progress/Outcomes (Bed Mobility Goal 1, PT) goal met  -KA       Row Name 11/25/23 1032          Transfer Goal 1 (PT)    Activity/Assistive Device (Transfer Goal 1, PT) transfers, all;walker, rolling  -KA     [place] Level/Cues Needed (Transfer Goal 1, PT) contact guard required  -KA     Time Frame (Transfer Goal 1, PT) 3 days  -KA     Progress/Outcome (Transfer Goal 1, PT) good progress toward goal  -KA       Row Name 11/25/23 1032          Gait Training Goal 1 (PT)    Activity/Assistive Device (Gait Training Goal 1, PT) gait (walking locomotion);walker, rolling  -KA     [place] Level (Gait Training Goal 1, PT) contact guard required  -KA     Distance (Gait Training Goal 1, PT) 50  -KA     Time Frame (Gait Training Goal 1, PT) 3 days  -KA     Progress/Outcome (Gait Training Goal 1, PT) good progress toward goal  -KA       Row Name 11/25/23 1032          ROM Goal 1 (PT)    ROM Goal 1 (PT) L knee 5-90  -KA     Time Frame (ROM Goal 1, PT) 3 days  -KA     Progress/Outcome (ROM Goal 1, PT) goal met  -KA       Row Name 11/25/23 1032          Stairs Goal 1 (PT)    Activity/Assistive Device (Stairs Goal 1, PT) stairs, all skills  -KA     [place] Level/Cues Needed (Stairs Goal 1, PT) contact guard required  -KA     Number of Stairs (Stairs Goal 1, PT) 4  -KA     Time Frame (Stairs Goal 1, PT) 3 days  -KA     Progress/Outcome (Stairs Goal 1, PT) good progress toward goal  -KA       Row Name 11/25/23  1032          Therapy Assessment/Plan (PT)    Planned Therapy Interventions (PT) balance training;bed mobility training;gait training;home exercise program;patient/family education;postural re-education;ROM (range of motion);stair training;strengthening;transfer training  -               User Key  (r) = Recorded By, (t) = Taken By, (c) = Cosigned By      Initials Name Provider Type    Leah Haskins, PT Physical Therapist                   Clinical Impression       Row Name 11/25/23 1025          Pain    Pretreatment Pain Rating 4/10  -KA     Posttreatment Pain Rating 4/10  -     Pain Location - knee  -     Pain Intervention(s) Repositioned;Ambulation/increased activity  -       Row Name 11/25/23 1025          Plan of Care Review    Plan of Care Reviewed With patient  -     Progress improving  -     Outcome Evaluation Pt seen by PT this AM.  Ambulated short distance to bathroom and to recliner outside room using RW, CGA, but transported pt to stairwell using recliner as she has fatigued too quickly to attempt stairs the past few days.  Pt requires mod VC and CGA-min A x 1-2 to ascend/descend 10 stairs using rail on L side ascending as it is in her home.  Requires less assistance ascending stairs than descending stairs.  Pt is adamant that she wants to go home and will be fine once she gets up the stairs.  Recommended that she wear gait belt and have neighbors present to assist her when she goes up stairs for the first time, continue to work on stairs with HH PT prior to attempting by herself.  Pt remains appropriate for skilled physical therapy while inpatient, recommending home with HHPT at discharge.  -       Row Name 11/25/23 1025          Therapy Assessment/Plan (PT)    Rehab Potential (PT) good, to achieve stated therapy goals  -     Criteria for Skilled Interventions Met (PT) yes  -     Therapy Frequency (PT) daily  -       Row Name 11/25/23 1025          Vital Signs    O2 Delivery  Pre Treatment room air  -KA     O2 Delivery Intra Treatment room air  -KA     O2 Delivery Post Treatment room air  -KA     Pre Patient Position Supine  -KA     Intra Patient Position Standing  -KA     Post Patient Position Supine  -KA       Row Name 11/25/23 1025          Positioning and Restraints    Pre-Treatment Position in bed  -KA     Post Treatment Position bed  -KA     In Bed notified nsg;supine;call light within reach;encouraged to call for assist  -KA               User Key  (r) = Recorded By, (t) = Taken By, (c) = Cosigned By      Initials Name Provider Type    Leah Haskins, PT Physical Therapist                   Outcome Measures       Row Name 11/25/23 1030 11/25/23 0400       How much help from another person do you currently need...    Turning from your back to your side while in flat bed without using bedrails? 4  -KA 4  -JL    Moving from lying on back to sitting on the side of a flat bed without bedrails? 4  -KA 4  -JL    Moving to and from a bed to a chair (including a wheelchair)? 3  -KA 3  -JL    Standing up from a chair using your arms (e.g., wheelchair, bedside chair)? 3  -KA 3  -JL    Climbing 3-5 steps with a railing? 3  -KA 3  -JL    To walk in hospital room? 3  -KA 3  -JL    AM-PAC 6 Clicks Score (PT) 20  -KA 20  -JL    Highest Level of Mobility Goal 6 --> Walk 10 steps or more  -KA 6 --> Walk 10 steps or more  -JHONNY      Row Name 11/25/23 1030          Functional Assessment    Outcome Measure Options AM-PAC 6 Clicks Basic Mobility (PT)  -KA               User Key  (r) = Recorded By, (t) = Taken By, (c) = Cosigned By      Initials Name Provider Type    Ignacio Sarmiento, TRINA Registered Nurse    Leah Haskins, PT Physical Therapist                                 Physical Therapy Education       Title: PT OT SLP Therapies (Done)       Topic: Physical Therapy (Done)       Point: Mobility training (Done)       Learning Progress Summary             Patient Acceptance, E,TB, VU,DU by JOSE FRANCISCO  at 11/23/2023 1345                         Point: Home exercise program (Done)       Learning Progress Summary             Patient Acceptance, E,TB, VU,DU by LB at 11/23/2023 1345                         Point: Body mechanics (Done)       Learning Progress Summary             Patient Acceptance, E,TB, VU,DU by LB at 11/23/2023 1345                         Point: Precautions (Done)       Learning Progress Summary             Patient Acceptance, E,TB, VU,DU by LB at 11/23/2023 1345                                         User Key       Initials Effective Dates Name Provider Type Discipline     08/09/20 -  Silva Gonzales, PT Physical Therapist PT                  PT Recommendation and Plan  Planned Therapy Interventions (PT): balance training, bed mobility training, gait training, home exercise program, patient/family education, postural re-education, ROM (range of motion), stair training, strengthening, transfer training  Plan of Care Reviewed With: patient  Progress: improving  Outcome Evaluation: Pt seen by PT this AM.  Ambulated short distance to bathroom and to recliner outside room using RW, CGA, but transported pt to stairwell using recliner as she has fatigued too quickly to attempt stairs the past few days.  Pt requires mod VC and CGA-min A x 1-2 to ascend/descend 10 stairs using rail on L side ascending as it is in her home.  Requires less assistance ascending stairs than descending stairs.  Pt is adamant that she wants to go home and will be fine once she gets up the stairs.  Recommended that she wear gait belt and have neighbors present to assist her when she goes up stairs for the first time, continue to work on stairs with HH PT prior to attempting by herself.  Pt remains appropriate for skilled physical therapy while inpatient, recommending home with HHPT at discharge.     Time Calculation:         PT Charges       Row Name 11/25/23 1034 11/25/23 1031          Time Calculation    Start Time -- 0920 -KA      Stop Time -- 0954 -KA     Time Calculation (min) -- 34 min  -KA     PT Received On -- 11/25/23  -TERRA     PT - Next Appointment -- 11/26/23  -TERRA     PT Goal Re-Cert Due Date 11/28/23  -KA --        Timed Charges    64096 - Gait Training Minutes  19  -KA --     91169 - PT Therapeutic Activity Minutes 15  -KA --        Total Minutes    Timed Charges Total Minutes 34  -KA --      Total Minutes 34  -KA --               User Key  (r) = Recorded By, (t) = Taken By, (c) = Cosigned By      Initials Name Provider Type    Leah Haskins, PT Physical Therapist                  Therapy Charges for Today       Code Description Service Date Service Provider Modifiers Qty    58309539507 HC GAIT TRAINING EA 15 MIN 11/25/2023 Leah Rojas, PT GP 1    33775188716 HC PT THERAPEUTIC ACT EA 15 MIN 11/25/2023 Leah Rojas, PT GP 1    12149662461 HC PT THER SUPP EA 15 MIN 11/25/2023 Leah Rojas, PT GP 1            PT G-Codes  Outcome Measure Options: AM-PAC 6 Clicks Basic Mobility (PT)  AM-PAC 6 Clicks Score (PT): 20  PT Discharge Summary  Anticipated Discharge Disposition (PT): home with home health, home with assist    Leah Rojas PT  11/25/2023     Hatchet Flap Text: The defect edges were debeveled with a #15 scalpel blade.  Given the location of the defect, shape of the defect and the proximity to free margins a hatchet flap was deemed most appropriate.  Using a sterile surgical marker, an appropriate hatchet flap was drawn incorporating the defect and placing the expected incisions within the relaxed skin tension lines where possible.    The area thus outlined was incised deep to adipose tissue with a #15 scalpel blade.  The skin margins were undermined to an appropriate distance in all directions utilizing iris scissors.

## 2023-11-25 NOTE — PLAN OF CARE
Goal Outcome Evaluation:  Plan of Care Reviewed With: patient        Progress: no change  Outcome Evaluation: Pt given tylenol this am for L knee pain with exertion, at rest pt denies pain, [place] dsg to l knee, dry/intact, edema to L knee has decreased , erythema to inner L thigh, turning brown, sr on tele, gets up to toilet with walker, slight assistance when sitting to standing.  PT worked with pt on steps, pt will be discharge with home health today , Cardiology NP came and spoke with pt re: Eliquis and Holter monitor which will be set up before pt leaves today. Pt waiting on her ride.

## 2023-11-25 NOTE — PROGRESS NOTES
"    Patient Name: Gaby Jackson  :1945  78 y.o.      Patient Care Team:  Maicol Florentino MD as PCP - General (Family Medicine)  Hermann Coreas MD as Consulting Physician (Hematology and Oncology)    Chief Complaint: knee pain    Interval History:   She is feeling much better today and BP is also better controlled.  She is anxious to get home and eat something that tastes good.  She is a bit nervous about more syncopal episodes but hopeful that the pacemaker will prevent this.    Objective   Vital Signs  Temp:  [98.1 °F (36.7 °C)-99 °F (37.2 °C)] 98.1 °F (36.7 °C)  Heart Rate:  [70-82] 70  Resp:  [18] 18  BP: (109-173)/(62-95) 173/76    Intake/Output Summary (Last 24 hours) at 2023 1220  Last data filed at 2023 0800  Gross per 24 hour   Intake 480 ml   Output --   Net 480 ml     Flowsheet Rows      Flowsheet Row First Filed Value   Admission Height 160 cm (63\") Documented at 2023 1643   Admission Weight 57.2 kg (126 lb) Documented at 2023 1643            Physical Exam:   General Appearance:    Alert, cooperative, in no acute distress   Lungs:     Clear to auscultation.  Normal respiratory effort and rate.      Heart:    Regular rhythm and normal rate, normal S1 and S2, no murmurs, gallops or rubs.     Chest Wall:    No abnormalities observed   Abdomen:     Soft, nontender, positive bowel sounds.     Extremities:   no cyanosis, clubbing or edema.  No marked joint deformities.  Adequate musculoskeletal strength.       Results Review:    Results from last 7 days   Lab Units 23  0533   SODIUM mmol/L 139   POTASSIUM mmol/L 4.3   CHLORIDE mmol/L 102   CO2 mmol/L 26.6   BUN mg/dL 17   CREATININE mg/dL 0.79   GLUCOSE mg/dL 107*   CALCIUM mg/dL 9.5     Results from last 7 days   Lab Units 23  0603 23  0047 23  2253   HSTROP T ng/L 46* 51* 47*     Results from last 7 days   Lab Units 23  0533   WBC 10*3/mm3 8.27   HEMOGLOBIN g/dL 9.2*   HEMATOCRIT % 27.9* "   PLATELETS 10*3/mm3 387         Results from last 7 days   Lab Units 11/24/23  0603   MAGNESIUM mg/dL 2.1                   Medication Review:   apixaban, 5 mg, Oral, Q12H  enoxaparin, 1 mg/kg, Subcutaneous, Q12H  ferrous sulfate, 325 mg, Oral, Daily With Breakfast  metoprolol succinate XL, 50 mg, Oral, Q12H  pravastatin, 40 mg, Oral, Nightly  sodium chloride, 10 mL, Intravenous, Q12H              Assessment & Plan     Atrial fibrillation: Likely due to high adrenergic state from recent surgery and pain. She converted spontaneously and has not had any further episodes. Her echocardiogram is normal. Her Pry0ue3-Djot score is 4 and her thromboembolic risk is higher in the setting of knee replacement surgery. She is maintaining sinus rhythm on 6.25 mg carvedilol BID.   Primary osteoarthritis of left knee: s/p knee replacement. She is working with physical therapy.   Hypertension: Better controlled today.   Hyperlipidemia: lipids at goal when checked in September 2023.    Ms. Jackson was seen in consultation for new onset A-fib following knee replacement and in the setting of elevated blood pressure and poor pain control.  She converted to sinus rhythm spontaneously.  She also had some elevated blood pressure but is now better controlled that her pain is controlled.  She is maintaining sinus rhythm on 6.25 mg carvedilol twice daily.  Her DTT1RA0-NJIm score is 4 and she is at high risk for thromboembolism following knee replacement; I am going to send her home on 5 mg apixaban twice daily (I gave her samples and a 30-day free card).  I am also going to send her home with a 2-week Zio patch monitor.  I will have her follow-up in office following the results of her monitor.  Hopefully her apixaban can be stopped.      ZULEYMA Schultz  [facility]  11/25/23  12:20 EST

## 2023-11-25 NOTE — PLAN OF CARE
Goal Outcome Evaluation:            Pt. Alert, oriented x4, ambulatory with 1 person standby or assisted, used bathroom with 1 person standby, remained swollen to left knee area, Pt. No voiced c/o pain, v/s stable, 02 sats kept over 90% on room air, no s/s acute distress noted

## 2023-11-25 NOTE — PLAN OF CARE
Goal Outcome Evaluation:  Plan of Care Reviewed With: patient        Progress: improving  Outcome Evaluation: Pt seen by PT this AM.  Ambulated short distance to bathroom and to recliner outside room using RW, CGA, but transported pt to stairwell using recliner as she has fatigued too quickly to attempt stairs the past few days.  Pt requires mod VC and CGA-min A x 1-2 to ascend/descend 10 stairs using rail on L side ascending as it is in her home.  Requires less assistance ascending stairs than descending stairs.  Pt is adamant that she wants to go home and will be fine once she gets up the stairs.  Recommended that she wear gait belt and have neighbors present to assist her when she goes up stairs for the first time, continue to work on stairs with HH PT prior to attempting by herself.  Pt remains appropriate for skilled physical therapy while inpatient, recommending home with HHPT at discharge.      Anticipated Discharge Disposition (PT): home with home health, home with assist

## 2023-11-25 NOTE — OUTREACH NOTE
Prep Survey      Flowsheet Row Responses   [facility] patient discharged from? [facility]   Is LACE score < 7 ? Yes   Eligibility [facility]   Date of Admission 11/21/23   Date of Discharge 11/25/23   Discharge Disposition Home or Self Care   Discharge diagnosis LEFT TOTAL KNEE ARTHROPLASTY   Does the patient have one of the following disease processes/diagnoses(primary or secondary)? Total Joint Replacement   Does the patient have Home health ordered? Yes   [facility]   Is there a DME ordered? No   Prep survey completed? Yes            Zayra HUTSON - Registered Nurse

## 2023-11-25 NOTE — PROGRESS NOTES
Name: Gaby Jackson ADMIT: 2023   : 1945  PCP: Maicol Florentino MD    MRN: [id number] LOS: 0 days   AGE/SEX: 78 y.o. female  ROOM: [facility]     Subjective   Subjective   Patient seen and examined this morning.  Hospital day 4. POD #4 () s/p left total knee arthroplasty. She is awake and resting in bed, doing well. Plans for discharge today.       Objective   Objective   Vital Signs  Temp:  [98.1 °F (36.7 °C)-99 °F (37.2 °C)] 98.1 °F (36.7 °C)  Heart Rate:  [70-82] 70  Resp:  [18] 18  BP: (109-173)/(62-95) 173/76  SpO2:  [95 %-98 %] 97 %  on  Flow (L/min):  [2] 2;   Device (Oxygen Therapy): room air  Body mass index is 22.32 kg/m².  Physical Exam  Vitals and nursing note reviewed.   Constitutional:       General: She is not in acute distress.  Cardiovascular:      Rate and Rhythm: Normal rate and regular rhythm.      Pulses: Normal pulses.      Heart sounds: Normal heart sounds.   Pulmonary:      Effort: Pulmonary effort is normal. No respiratory distress.      Breath sounds: Normal breath sounds. No wheezing.   Abdominal:      General: Bowel sounds are normal.      Palpations: Abdomen is soft.      Tenderness: There is no abdominal tenderness.   Musculoskeletal:      Right lower leg: No edema.      Left lower leg: No edema.      Comments: Left knee covered with bandage/dressing, C/D/I   Skin:     General: Skin is warm and dry.   Neurological:      Mental Status: She is alert.       Results Review     I reviewed the patient's new clinical results.  Results from last 7 days   Lab Units 23  0533 23  0603 23  0435   WBC 10*3/mm3 8.27 9.59 12.18* 8.56   HEMOGLOBIN g/dL 9.2* 9.4* 10.2* 8.7*   PLATELETS 10*3/mm3 387 342 367 293     Results from last 7 days   Lab Units 23  0533 23  0911 23  0603 23  0435   SODIUM mmol/L 139  --  137 138 137   POTASSIUM mmol/L 4.3 4.6 4.2 3.2* 3.6   CHLORIDE mmol/L 102  --  98 98 101   CO2 mmol/L  "26.6  --  29.8* 27.8 24.9   BUN mg/dL 17  --  15 12 15   CREATININE mg/dL 0.79  --  0.74 0.66 0.84   GLUCOSE mg/dL 107*  --  111* 119* 118*   EGFR mL/min/1.73 77.2  --  83.4 90.5 71.7     Results from last 7 days   Lab Units 11/24/23  0603   ALBUMIN g/dL 3.6     Results from last 7 days   Lab Units 11/25/23  0533 11/24/23  0603 11/23/23  2253 11/22/23  0435   CALCIUM mg/dL 9.5 9.2 9.1 8.5*   ALBUMIN g/dL  --  3.6  --   --    MAGNESIUM mg/dL  --  2.1  --   --    PHOSPHORUS mg/dL  --  2.7  --   --        No results found for: \"HGBA1C\", \"POCGLU\"    No radiology results for the last day    I have personally reviewed all medications:  Scheduled Medications  apixaban, 5 mg, Oral, Q12H  enoxaparin, 1 mg/kg, Subcutaneous, Q12H  ferrous sulfate, 325 mg, Oral, Daily With Breakfast  metoprolol succinate XL, 50 mg, Oral, Q12H  pravastatin, 40 mg, Oral, Nightly  sodium chloride, 10 mL, Intravenous, Q12H    Infusions   Diet  Diet: Regular/House Diet; Texture: Regular Texture (IDDSI 7); Fluid Consistency: Thin (IDDSI 0)    I have personally reviewed:  [x]  Laboratory   [x]  Microbiology   [x]  Radiology   [x]  EKG/Telemetry  [x]  Cardiology/Vascular   []  Pathology    []  Records       Assessment/Plan     Active Hospital Problems    Diagnosis  POA    **Primary osteoarthritis of left knee [M17.12]  Yes    Atrial fibrillation [I48.91]  No    Hypokalemia [E87.6]  No    Hyperlipidemia [E78.5]  Yes    Hypertension [I10]  Yes      Resolved Hospital Problems   No resolved problems to display.       78 y.o. female admitted with Primary osteoarthritis of left knee.    New onset paroxysmal atrial fibrillation  - [id number] score: 4. New onset on evening of 11/23. Patient s/p IV metoprolol. Remains on oral metoprolol at present. Telemetry reviewed, now back in sinus rhythm, heart rate controlled at present. TSH normal.  - Treated with therapeutic Lovenox in setting of atrial fibrillation. Cardiology consulted and following, plans for meleo " patch and Eliquis 5 mg BID at discharge. She will need close outpatient follow up with Cardiology.    Elevated troponin  - Noted on labs, likely a result of atrial fibrillation. No signs/symptoms suggestive of ACS at present. Cardiology following.    Hypertension  - Blood pressure appears stable and acceptable acutely at this time. Home HCTZ currently being held, likely okay to resume at discharge. Recommend that patient check her blood pressure 2-3 times daily and record those values in log book and take with her to next PCP visit to allow for greater insight into treatment efficacy to guide further management decisions. Recommend heart healthy/low sodium diet.    Hyperglycemia  - Glucose elevated on most recent labs. Patient without known history of T2DM.  Most recent hemoglobin A1c 5.60% (11/07/23). Recommend close follow up with PCP within 1 week after discharge for reassessment to guide ongoing management decisions.    Anemia  - Hemoglobin low on most recent labs, however, stable from prior. No indications for acute intervention at this time.  Recommend repeat CBC with PCP within 1 week for reassessment.    Hypokalemia  - Repleted via electrolyte protocol, improved on most recent testing. Recommend repeat level check with PCP within 1 week for reassessment.    Hyperlipidemia  - Continue Statin as prescribed.      Thank you for asking A to be involved in this patient's care. We will follow along with you.    Pharmacy to dose Lovenox for DVT prophylaxis.  Full code.  Discussed with patient and nursing staff.  Anticipate discharge  Today   Per primary      DO [facility]  11/25/23

## 2023-11-25 NOTE — PROGRESS NOTES
"Orthopedic Progress Note      Patient: Gaby Jackson    Date of Admission: 2023 10:37 AM    YOB: 1945    Medical Record Number: [id number]    Attending Physician: Joseph Joshi MD    Post Operative Day Number: 4    Status Post: [facility] ARTHRP KNE CONDYLE&PLATU MEDIAL&LAT COMPARTMENTS [40308] (LEFT TOTAL KNEE ARTHROPLASTY)    Current Medications:  Scheduled Meds:enoxaparin, 1 mg/kg, Subcutaneous, Q12H  ferrous sulfate, 325 mg, Oral, Daily With Breakfast  metoprolol succinate XL, 50 mg, Oral, Q12H  pravastatin, 40 mg, Oral, Nightly  sodium chloride, 10 mL, Intravenous, Q12H      Continuous Infusions:   PRN Meds:.  acetaminophen    amitriptyline    diazePAM    docusate sodium    melatonin    [] Morphine **AND** naloxone    ondansetron **OR** ondansetron    oxyCODONE-acetaminophen    oxyCODONE-acetaminophen    Potassium Replacement - Follow Nurse / BPA Driven Protocol    promethazine    sodium chloride    sodium chloride    Physical Exam: 78 y.o. female  General Appearance:    Alert, cooperative, in no acute distress                   Vitals:    23 1340 23 1928 23 2335 23 0721   BP: 151/95 109/62 131/74 173/76   BP Location:  Right arm Right arm Left arm   Patient Position:  Lying Lying Lying   Pulse: 82 81 77    Resp:  18 18 18   Temp:  98.8 °F (37.1 °C) 99 °F (37.2 °C) 98.1 °F (36.7 °C)   TempSrc:  Oral Oral Oral   SpO2:  98% 97%    Weight: 57.2 kg (126 lb)      Height: 160 cm (63\")           Extremities:   Dressing Dry and Intact    Incision intact without signs or symptoms of infections   Pulses:   Pulses palpable and equal bilaterally   Skin:   No bleeding, bruising or rash   Range of motion improving    Diagnostic Tests:    Admission on 2023   Component Date Value Ref Range Status    Glucose 2023 118 (H)  65 - 99 mg/dL Final    BUN 2023 15  8 - 23 mg/dL Final    Creatinine 2023 0.84  0.57 - 1.00 mg/dL Final    Sodium 2023 137  136 - " 145 mmol/L Final    Potassium 11/22/2023 3.6  3.5 - 5.2 mmol/L Final    Chloride 11/22/2023 101  98 - 107 mmol/L Final    CO2 11/22/2023 24.9  22.0 - 29.0 mmol/L Final    Calcium 11/22/2023 8.5 (L)  8.6 - 10.5 mg/dL Final    BUN/Creatinine Ratio 11/22/2023 17.9  7.0 - 25.0 Final    Anion Gap 11/22/2023 11.1  5.0 - 15.0 mmol/L Final    eGFR 11/22/2023 71.7  >60.0 mL/min/1.73 Final    WBC 11/22/2023 8.56  3.40 - 10.80 10*3/mm3 Final    RBC 11/22/2023 3.01 (L)  3.77 - 5.28 10*6/mm3 Final    Hemoglobin 11/22/2023 8.7 (L)  12.0 - 15.9 g/dL Final    Hematocrit 11/22/2023 26.3 (L)  34.0 - 46.6 % Final    MCV 11/22/2023 87.4  79.0 - 97.0 fL Final    MCH 11/22/2023 28.9  26.6 - 33.0 pg Final    MCHC 11/22/2023 33.1  31.5 - 35.7 g/dL Final    RDW 11/22/2023 12.3  12.3 - 15.4 % Final    RDW-SD 11/22/2023 39.7  37.0 - 54.0 fl Final    MPV 11/22/2023 9.8  6.0 - 12.0 fL Final    Platelets 11/22/2023 293  140 - 450 10*3/mm3 Final    Glucose 11/23/2023 119 (H)  65 - 99 mg/dL Final    BUN 11/23/2023 12  8 - 23 mg/dL Final    Creatinine 11/23/2023 0.66  0.57 - 1.00 mg/dL Final    Sodium 11/23/2023 138  136 - 145 mmol/L Final    Potassium 11/23/2023 3.2 (L)  3.5 - 5.2 mmol/L Final    Chloride 11/23/2023 98  98 - 107 mmol/L Final    CO2 11/23/2023 27.8  22.0 - 29.0 mmol/L Final    Calcium 11/23/2023 9.1  8.6 - 10.5 mg/dL Final    BUN/Creatinine Ratio 11/23/2023 18.2  7.0 - 25.0 Final    Anion Gap 11/23/2023 12.2  5.0 - 15.0 mmol/L Final    eGFR 11/23/2023 90.5  >60.0 mL/min/1.73 Final    QT Interval 11/23/2023 311  ms Preliminary    QTC Interval 11/23/2023 465  ms Preliminary    WBC 11/23/2023 12.18 (H)  3.40 - 10.80 10*3/mm3 Final    RBC 11/23/2023 3.50 (L)  3.77 - 5.28 10*6/mm3 Final    Hemoglobin 11/23/2023 10.2 (L)  12.0 - 15.9 g/dL Final    Hematocrit 11/23/2023 30.8 (L)  34.0 - 46.6 % Final    MCV 11/23/2023 88.0  79.0 - 97.0 fL Final    MCH 11/23/2023 29.1  26.6 - 33.0 pg Final    MCHC 11/23/2023 33.1  31.5 - 35.7 g/dL Final     RDW 11/23/2023 12.4  12.3 - 15.4 % Final    RDW-SD 11/23/2023 39.9  37.0 - 54.0 fl Final    MPV 11/23/2023 10.1  6.0 - 12.0 fL Final    Platelets 11/23/2023 367  140 - 450 10*3/mm3 Final    Neutrophil % 11/23/2023 78.4 (H)  42.7 - 76.0 % Final    Lymphocyte % 11/23/2023 10.7 (L)  19.6 - 45.3 % Final    Monocyte % 11/23/2023 9.5  5.0 - 12.0 % Final    Eosinophil % 11/23/2023 0.9  0.3 - 6.2 % Final    Basophil % 11/23/2023 0.2  0.0 - 1.5 % Final    Immature Grans % 11/23/2023 0.3  0.0 - 0.5 % Final    Neutrophils, Absolute 11/23/2023 9.54 (H)  1.70 - 7.00 10*3/mm3 Final    Lymphocytes, Absolute 11/23/2023 1.30  0.70 - 3.10 10*3/mm3 Final    Monocytes, Absolute 11/23/2023 1.16 (H)  0.10 - 0.90 10*3/mm3 Final    Eosinophils, Absolute 11/23/2023 0.11  0.00 - 0.40 10*3/mm3 Final    Basophils, Absolute 11/23/2023 0.03  0.00 - 0.20 10*3/mm3 Final    Immature Grans, Absolute 11/23/2023 0.04  0.00 - 0.05 10*3/mm3 Final    nRBC 11/23/2023 0.0  0.0 - 0.2 /100 WBC Final    HS Troponin T 11/23/2023 47 (H)  <14 ng/L Final    TSH 11/23/2023 0.862  0.270 - 4.200 uIU/mL Final    HS Troponin T 11/24/2023 51 (H)  <14 ng/L Final    Troponin T Delta 11/24/2023 4 (C)  >=-4 - <+4 ng/L Final    Glucose 11/24/2023 111 (H)  65 - 99 mg/dL Final    BUN 11/24/2023 15  8 - 23 mg/dL Final    Creatinine 11/24/2023 0.74  0.57 - 1.00 mg/dL Final    Sodium 11/24/2023 137  136 - 145 mmol/L Final    Potassium 11/24/2023 4.2  3.5 - 5.2 mmol/L Final    Chloride 11/24/2023 98  98 - 107 mmol/L Final    CO2 11/24/2023 29.8 (H)  22.0 - 29.0 mmol/L Final    Calcium 11/24/2023 9.2  8.6 - 10.5 mg/dL Final    Albumin 11/24/2023 3.6  3.5 - 5.2 g/dL Final    Phosphorus 11/24/2023 2.7  2.5 - 4.5 mg/dL Final    Anion Gap 11/24/2023 9.2  5.0 - 15.0 mmol/L Final    BUN/Creatinine Ratio 11/24/2023 20.3  7.0 - 25.0 Final    eGFR 11/24/2023 83.4  >60.0 mL/min/1.73 Final    Magnesium 11/24/2023 2.1  1.6 - 2.4 mg/dL Final    EF(MOD-bp) 11/24/2023 61.4  % Final    LV Sys Vol  (BSA corrected) 11/24/2023 17.6  cm2 Final    LV Nieves Vol (BSA corrected) 11/24/2023 46.6  cm2 Final    LVOT area 11/24/2023 3.1  cm2 Final    LVOT diam 11/24/2023 2.00  cm Final    EDV(MOD-sp2) 11/24/2023 58.0  ml Final    EDV(MOD-sp4) 11/24/2023 74.0  ml Final    ESV(MOD-sp2) 11/24/2023 23.0  ml Final    ESV(MOD-sp4) 11/24/2023 28.0  ml Final    SV(MOD-sp2) 11/24/2023 35.0  ml Final    SV(MOD-sp4) 11/24/2023 46.0  ml Final    SI(MOD-sp2) 11/24/2023 22.0  ml/m2 Final    SI(MOD-sp4) 11/24/2023 28.9  ml/m2 Final    EF(MOD-sp2) 11/24/2023 60.3  % Final    EF(MOD-sp4) 11/24/2023 62.2  % Final    MV E max dangelo 11/24/2023 90.7  cm/sec Final    MV A max dangelo 11/24/2023 111.6  cm/sec Final    MV dec time 11/24/2023 287  sec Final    MV E/A 11/24/2023 0.81   Final    Pulm A Revs Dur 11/24/2023 0.14  sec Final    MV A dur 11/24/2023 0.14  sec Final    LA ESV Index (BP) 11/24/2023 23.2  ml/m2 Final    Med Peak E' Dangelo 11/24/2023 7.3  cm/sec Final    Lat Peak E' Dangelo 11/24/2023 9.2  cm/sec Final    Avg E/e' ratio 11/24/2023 10.99   Final    SV(LVOT) 11/24/2023 71.1  ml Final    RV Base 11/24/2023 3.0  cm Final    RV Mid 11/24/2023 2.7  cm Final    RV Length 11/24/2023 6.7  cm Final    TAPSE (>1.6) 11/24/2023 2.11  cm Final    RV S' 11/24/2023 12.8  cm/sec Final    Pulm Sys Dangelo 11/24/2023 46.2  cm/sec Final    Pulm Nieves Dangelo 11/24/2023 35.7  cm/sec Final    Pulm S/D 11/24/2023 1.29   Final    Pulm A Revs Dangelo 11/24/2023 30.0  cm/sec Final    LV V1 max 11/24/2023 117.4  cm/sec Final    LV V1 max PG 11/24/2023 5.5  mmHg Final    LV V1 mean PG 11/24/2023 2.6  mmHg Final    LV V1 VTI 11/24/2023 22.6  cm Final    Ao pk dangelo 11/24/2023 154.7  cm/sec Final    Ao max PG 11/24/2023 9.6  mmHg Final    Ao mean PG 11/24/2023 5.5  mmHg Final    Ao V2 VTI 11/24/2023 30.5  cm Final    CHRISSY(I,D) 11/24/2023 2.33  cm2 Final    MV max PG 11/24/2023 5.1  mmHg Final    MV mean PG 11/24/2023 2.7  mmHg Final    MV V2 VTI 11/24/2023 28.7  cm Final    MV P1/2t  11/24/2023 92.1  msec Final    MVA(P1/2t) 11/24/2023 2.39  cm2 Final    MVA(VTI) 11/24/2023 2.47  cm2 Final    MV dec slope 11/24/2023 345.3  cm/sec2 Final    RV V1 max PG 11/24/2023 3.4  mmHg Final    RV V1 max 11/24/2023 92.2  cm/sec Final    RV V1 VTI 11/24/2023 18.4  cm Final    PA V2 max 11/24/2023 99.1  cm/sec Final    PA acc time 11/24/2023 0.10  sec Final    Ao root diam 11/24/2023 3.2  cm Final    ACS 11/24/2023 1.83  cm Final    Sinus 11/24/2023 2.9  cm Final    IVSd 11/24/2023 0.99  cm Final    LVIDd 11/24/2023 3.3  cm Final    EDV(cubed) 11/24/2023 35.4  ml Final    LVPWd 11/24/2023 0.93  cm Final    IVS/LVPW 11/24/2023 1.07  cm Final    LV mass(C)d 11/24/2023 89.0  grams Final    LVIDs 11/24/2023 2.27  cm Final    FS 11/24/2023 30.8  % Final    ESV(cubed) 11/24/2023 11.7  ml Final    Dimensionless Index 11/24/2023 0.70  (DI) Final    Aortic arch 11/24/2023 3.7  cm Final    WBC 11/24/2023 9.59  3.40 - 10.80 10*3/mm3 Final    RBC 11/24/2023 3.28 (L)  3.77 - 5.28 10*6/mm3 Final    Hemoglobin 11/24/2023 9.4 (L)  12.0 - 15.9 g/dL Final    Hematocrit 11/24/2023 28.3 (L)  34.0 - 46.6 % Final    MCV 11/24/2023 86.3  79.0 - 97.0 fL Final    MCH 11/24/2023 28.7  26.6 - 33.0 pg Final    MCHC 11/24/2023 33.2  31.5 - 35.7 g/dL Final    RDW 11/24/2023 12.0 (L)  12.3 - 15.4 % Final    RDW-SD 11/24/2023 37.8  37.0 - 54.0 fl Final    MPV 11/24/2023 10.1  6.0 - 12.0 fL Final    Platelets 11/24/2023 342  140 - 450 10*3/mm3 Final    Potassium 11/24/2023 4.6  3.5 - 5.2 mmol/L Final    HS Troponin T 11/24/2023 46 (H)  <14 ng/L Final    Glucose 11/25/2023 107 (H)  65 - 99 mg/dL Final    BUN 11/25/2023 17  8 - 23 mg/dL Final    Creatinine 11/25/2023 0.79  0.57 - 1.00 mg/dL Final    Sodium 11/25/2023 139  136 - 145 mmol/L Final    Potassium 11/25/2023 4.3  3.5 - 5.2 mmol/L Final    Chloride 11/25/2023 102  98 - 107 mmol/L Final    CO2 11/25/2023 26.6  22.0 - 29.0 mmol/L Final    Calcium 11/25/2023 9.5  8.6 - 10.5 mg/dL Final     BUN/Creatinine Ratio 11/25/2023 21.5  7.0 - 25.0 Final    Anion Gap 11/25/2023 10.4  5.0 - 15.0 mmol/L Final    eGFR 11/25/2023 77.2  >60.0 mL/min/1.73 Final    WBC 11/25/2023 8.27  3.40 - 10.80 10*3/mm3 Final    RBC 11/25/2023 3.13 (L)  3.77 - 5.28 10*6/mm3 Final    Hemoglobin 11/25/2023 9.2 (L)  12.0 - 15.9 g/dL Final    Hematocrit 11/25/2023 27.9 (L)  34.0 - 46.6 % Final    MCV 11/25/2023 89.1  79.0 - 97.0 fL Final    MCH 11/25/2023 29.4  26.6 - 33.0 pg Final    MCHC 11/25/2023 33.0  31.5 - 35.7 g/dL Final    RDW 11/25/2023 12.8  12.3 - 15.4 % Final    RDW-SD 11/25/2023 41.8  37.0 - 54.0 fl Final    MPV 11/25/2023 10.1  6.0 - 12.0 fL Final    Platelets 11/25/2023 387  140 - 450 10*3/mm3 Final       No results found.    Assessment:    Primary osteoarthritis of left knee    Hypertension    Hyperlipidemia    Atrial fibrillation    Hypokalemia      Plan:    Patient is improving with physical therapy.  Atrial fib controlled and she has converted back to normal sinus rhythm.  Cardiology following.  She is on Lovenox and will likely need to be on an oral anticoagulant.  Okay for discharge from orthopedic standpoint once cleared by cardiology.    Discharge Plan: today to home    Date: 11/25/2023    Telly Gimenez MD

## 2023-11-26 NOTE — CASE MANAGEMENT/SOCIAL WORK
Case Management Discharge Note      Final Note: Pt discharged home with Lroraine GRACIA on 11/25    LISET Wren RN    Provided Post Acute Provider List?: N/A  N/A Provider List Comment: Declines; requests [facility].    Selected Continued Care - Discharged on 11/25/2023 Admission date: 11/21/2023 - Discharge disposition: Home or Self Care      Destination    No services have been selected for the patient.                Durable Medical Equipment    No services have been selected for the patient.                Dialysis/Infusion    No services have been selected for the patient.                Home Medical Care Coordination complete.      Service Provider Selected Services Address Phone Fax Patient Preferred    [facility] [address] [place] [id number] [phone] --              Therapy    No services have been selected for the patient.                Community Resources    No services have been selected for the patient.                Community & DME    No services have been selected for the patient.                    Transportation Services  Private: Car    Final Discharge Disposition Code: 06 - home with home health care

## 2023-11-27 ENCOUNTER — TELEPHONE (OUTPATIENT)
Dept: ORTHOPEDIC SURGERY | Facility: HOSPITAL | Age: 78
End: 2023-11-27
Payer: MEDICARE

## 2023-11-27 ENCOUNTER — TRANSITIONAL CARE MANAGEMENT TELEPHONE ENCOUNTER (OUTPATIENT)
Dept: CALL CENTER | Facility: HOSPITAL | Age: 78
End: 2023-11-27
Payer: MEDICARE

## 2023-11-27 NOTE — OUTREACH NOTE
Call Center TCM Note      Flowsheet Row Responses   [facility] patient discharged from? [facility]   Does the patient have one of the following disease processes/diagnoses(primary or secondary)? Total Joint Replacement   Joint surgery performed? Knee   TCM attempt successful? Yes   Call start time 0857   Call end time 0859   Discharge diagnosis LEFT TOTAL KNEE ARTHROPLASTY   Does the patient have all medications related to this admission filled (includes all antibiotics, pain medications, etc.) Yes   Is the patient taking all medications as directed (includes completed medication regime)? No   What is preventing the patient from taking all medications as directed? Side effects  [Patient is just taking Tylenol for pain. She states the percocet makes her sick.]   Is the patient able to teach back alternate methods of pain control? Ice, Knee-elevation/no pillow under knee, Shoulder-elevate above heart/ keep in sling as advised, Reposition, Correct alignment, Short, frequent activity   Nursing Interventions Nurse provided patient education   Does the patient have an appointment with their PCP within 7-14 days of discharge? No   Nursing Interventions Patient declined scheduling/rescheduling appointment at this time  [Patient states she has to call to schedule her follow up appointments due to having to coordinate appointment schedule with her ride.]   What is the Home health agency?  [facility]   Has home health visited the patient within 72 hours of discharge? Call prior to 72 hours   Psychosocial issues? No   Has the patient began therapy sessions (either in the home or as an out patient)? No   Does the patient have a wound vac in place? No   Has the patient fallen since discharge? No   Did the patient receive a copy of their discharge instructions? Yes   Nursing interventions Reviewed instructions with patient   What is the patient's perception of their functional status since discharge?  Improving   Is the patient able to teach back signs and symptoms of infection? Temp >100.4 for 24h or longer, Incisional drainage, Blisters around incision, Increased swelling or redness around incision (not associated with surgical edema), Severe discomfort or pain, Changes in mobility, Shortness of breath or chest pain   Is the patient able to teach back how to prevent infection? Check incision daily, Wash hands before and after touching incision, Keep incision covered if drainage, Shower only as directed by surgeon, Eat well-balanced diet, No tub baths, hot tub or swimming   Is the patient able to teach back signs and symptoms of DVT? Swelling in calf, Redness in calf, Area hot to touch, Severe pain in calf   Is the patient able to teach back home safety measures? Ability to shower, Accessibility to necessary areas in home, Modifications to reach items, Modifications with ADLs such as dressing, cooking, toileting   If the patient is a current smoker, are they able to teach back resources for cessation? Not a smoker   Is the patient/caregiver able to teach back the hierarchy of who to call/visit for symptoms/problems? PCP, Specialist, Home health nurse, Urgent Care, ED, 911 Yes   TCM call completed? Yes   Call end time 0859   Would this patient benefit from a Referral to [facility] Social Work? No   Is the patient interested in additional calls from an ambulatory ? No            Stacy Nguyen LPN    11/27/2023, 09:13 EST

## 2023-11-27 NOTE — TELEPHONE ENCOUNTER
Called and spoke with Ms. Jackson to see how she is doing as she is POD 6 LTK. She said she is doing ok. She stayed a few days in the hospital so PT hasn't been out to see her yet. She said if she didn't hear from them today she would give them a call. She has been up and walking and doing some of the exercises. She is icing and elevating. She is off the pain medication and taking Tylenol. She said the pain is tolerable. BM's are fine. Ms. Jackson doesn't have any questions for me at this time. She has my contact information should she need anything.

## 2023-12-19 ENCOUNTER — TELEPHONE (OUTPATIENT)
Dept: CARDIOLOGY | Facility: CLINIC | Age: 78
End: 2023-12-19
Payer: MEDICARE

## 2023-12-19 DIAGNOSIS — I10 HYPERTENSION, UNSPECIFIED TYPE: ICD-10-CM

## 2023-12-19 RX ORDER — METOPROLOL SUCCINATE 50 MG/1
75 TABLET, EXTENDED RELEASE ORAL DAILY
Qty: 135 TABLET | Refills: 3 | Status: SHIPPED | OUTPATIENT
Start: 2023-12-19

## 2023-12-19 NOTE — TELEPHONE ENCOUNTER
I tried to call Gaby Jackson but there was no answer.  Left a voicemail asking patient to call back.  Will continue to try to reach pt.    HUB- if pt calls back, please transfer through to triage.    Triage- I've placed 2 week's worth of samples of apixaban 5 mg BID at the  for pt for pickup at her convenience.  LOT: [id number]  EXP: June 2025    Thank you,    Madison EDWARDS RN  Triage [facility]  12/19/23 14:44 EST

## 2023-12-19 NOTE — TELEPHONE ENCOUNTER
Monitor was ordered when she was in hospital and had new Afib; she was seen in consult by Dr. Galindo. I sent her home on 5 mg apixaban BID.     Her monitor shows that she had Afib about 5% of the time while wearing the monitor. When she had Afib it was fast (128 bpm). Her overall average HR was 85.     Please make sure that she is still taking 5 mg apixaban BID.     I believe she is on 50 mg Toprol nightly. We may need to increase if her BP allows.     She has an appointment with Maria Teresa on 1/4/24.     Thanks!  ZULEYMA Blankenship

## 2023-12-19 NOTE — TELEPHONE ENCOUNTER
I spoke with Gaby Jackson and updated pt on results/recommendations from provider.  Pt verbalized understanding.    Pt reports that she hasn't been taking the apixaban because she didn't realize she was supposed to.  I educated pt on importance of OAC with AF, and risks for clots.  She verbalized understanding.  She's requesting for the prescription to be sent to her [facility] pharmacy on file for her.    She's taking metoprolol succinate 50 mg QHS.  She's not sure what her BP has been running as she doesn't have a machine to check this at home.    Thank you,    Madison EDWARDS RN  Triage [facility]  12/19/23 13:21 EST

## 2023-12-19 NOTE — TELEPHONE ENCOUNTER
I've sent the apixaban in; if it's expensive, don't pick it up but call the office. Please arrange for her to get samples.     Also, increase metoprolol to 75 mg nightly.     Keep appointment with Maria Teresa in January 2024.     Thanks!  ZULEYMA Blankenship

## 2023-12-20 ENCOUNTER — TELEPHONE (OUTPATIENT)
Dept: CARDIOLOGY | Facility: CLINIC | Age: 78
End: 2023-12-20

## 2023-12-20 NOTE — TELEPHONE ENCOUNTER
Hub staff attempted to follow warm transfer process and was unsuccessful     Caller: Gayb Jackson    Relationship to patient: Self    Best call back number: [phone]    Patient is needing: PATIENT STATED THAT SHE WAS RETURNING A CALL FROM TRIAGE.

## 2023-12-20 NOTE — TELEPHONE ENCOUNTER
See other telephone encounter.    Thank you,    Madison EDWARDS, RN  [place] [facility] [place]  12/20/23 10:35 EST

## 2023-12-20 NOTE — TELEPHONE ENCOUNTER
I spoke with Gaby Jackson and updated pt on results/recommendations from provider.  Pt verbalized understanding and has no further questions at this time.    Thank you,    Madison EDWARDS, RN  [place] [facility] [place]  12/20/23 10:41 EST

## 2023-12-20 NOTE — TELEPHONE ENCOUNTER
I tried to call Gaby Jackson but there was no answer.  Left a voicemail asking patient to call back.  Will continue to try to reach pt.     HUB- if pt calls back, please transfer through to triage.     Triage- I've placed 2 week's worth of samples of apixaban 5 mg BID at the  for pt for pickup at her convenience.    LOT: [id number]  EXP: June 2025    Thank you,    Madison EDWARDS RN  Triage [facility] [place]  12/20/23 09:14 EST

## 2024-01-02 RX ORDER — MELOXICAM 15 MG/1
TABLET ORAL
Qty: 90 TABLET | Refills: 1 | OUTPATIENT
Start: 2024-01-02

## 2024-01-04 ENCOUNTER — OFFICE VISIT (OUTPATIENT)
Dept: CARDIOLOGY | Facility: CLINIC | Age: 79
End: 2024-01-04
Payer: MEDICARE

## 2024-01-04 VITALS
WEIGHT: 119 LBS | BODY MASS INDEX: 27.54 KG/M2 | SYSTOLIC BLOOD PRESSURE: 122 MMHG | HEART RATE: 64 BPM | OXYGEN SATURATION: 98 % | HEIGHT: 55 IN | DIASTOLIC BLOOD PRESSURE: 80 MMHG

## 2024-01-04 DIAGNOSIS — I10 HYPERTENSION, UNSPECIFIED TYPE: ICD-10-CM

## 2024-01-04 DIAGNOSIS — I48.0 PAROXYSMAL ATRIAL FIBRILLATION: Primary | ICD-10-CM

## 2024-01-04 NOTE — PATIENT INSTRUCTIONS
Visit [email or web address] to Learn More about Atrial Fibrillation.     Atrial Fibrillation Fact Sheet    Atrial Fibrillation [facility] [place] Risk Assessment Sheet    Atrial Fibrillation 10 Things to Know    Atrial Fibrillation- Up To Date Patient Basics      Cardioversion: Up to Date Patient Basics    Ablation: Up to Date Patient Basics

## 2024-01-04 NOTE — PROGRESS NOTES
"    CARDIOLOGY        Patient Name: Gaby Jackson  :1945  Age: 78 y.o.  Primary Cardiologist: Gt Galindo MD  Encounter Provider:  ZULEYMA Paris    Date of Service: 24        CHIEF COMPLAINT / REASON FOR OFFICE VISIT     Hospital Follow Up Visit and Atrial Fibrillation      HISTORY OF PRESENT ILLNESS       HPI  Gaby Jackson is a 78 y.o. female who presents today for hospital follow-up.     Pt has a  history significant for hyperlipidemia, hypertension, PAF.    Patient establish care with Dr. Galindo during a consultation during hospitalization in 2023.  Patient presented for elective total knee replacement.  Patient was planned to be discharged and was noted to have elevated heart rates, ECG revealed new onset atrial fibrillation.  At the time of consult patient was back in normal sinus rhythm.  Patient had an echocardiogram which revealed preserved LVEF with no significant valvular stenosis or regurgitation.  Patient had an outpatient ZIO monitor which revealed a 5% A-fib burden.  Patient is anticoagulated with apixaban.  She was also placed on metoprolol succinate for rate control as patient's rate is often rapid when she goes into atrial fibrillation.    Patient reports that she has done well since release from the hospital.  She denies episodes of chest pain, palpitations, dyspnea, FERRARA, edema, increased fatigue.  She is tolerating all medications without adverse effects, specifically no bleeding with apixiban.      The following portions of the patient's history were reviewed and updated as appropriate: allergies, current medications, past family history, past medical history, past social history, past surgical history and problem list.      VITAL SIGNS     Visit Vitals  /80 (BP Location: Left arm, Patient Position: Sitting)   Pulse 64   Ht 63 cm (24.8\")   Wt 54 kg (119 lb)   SpO2 98%   .00 kg/m²         Wt Readings from Last 3 Encounters:   24 54 kg (119 lb) "   11/24/23 57.2 kg (126 lb)   11/07/23 55.7 kg (122 lb 11.2 oz)     Body mass index is 136 kg/m².      REVIEW OF SYSTEMS   Review of Systems   Constitutional: Negative for chills, fever, weight gain and weight loss.   Cardiovascular:  Negative for leg swelling.   Respiratory:  Negative for cough, snoring and wheezing.    Hematologic/Lymphatic: Negative for bleeding problem. Does not bruise/bleed easily.   Skin:  Negative for color change.   Musculoskeletal:  Negative for falls, joint pain and myalgias.   Gastrointestinal:  Negative for melena.   Genitourinary:  Negative for hematuria.   Neurological:  Negative for excessive daytime sleepiness.   Psychiatric/Behavioral:  Negative for depression. The patient is not nervous/anxious.            PHYSICAL EXAMINATION     Vitals and nursing note reviewed.   Constitutional:       Appearance: Normal appearance. Well-developed.   Eyes:      Conjunctiva/sclera: Conjunctivae normal.   Neck:      Vascular: No carotid bruit.   Pulmonary:      Breath sounds: Normal breath sounds.   Cardiovascular:      Normal rate. Regular rhythm. Normal S1 with normal intensity. Normal S2 with normal intensity.       Murmurs: There is no murmur.      No gallop.  No click. No rub.   Edema:     Peripheral edema absent.   Musculoskeletal: Normal range of motion. Skin:     General: Skin is warm and dry.   Neurological:      Mental Status: Alert and oriented to person, place, and time.      GCS: GCS eye subscore is 4. GCS verbal subscore is 5. GCS motor subscore is 6.   Psychiatric:         Speech: Speech normal.         Behavior: Behavior normal.         Thought Content: Thought content normal.         Judgment: Judgment normal.           REVIEWED DATA     Procedures    Cardiac Procedures:  Echocardiogram 11/24/2023.    LVEF 61%.  Diastolic function was normal.  No valvular regurgitation or stenosis.   Zio monitor 12/18/2023.  AF burden of 5%.  When patient was in AF, HR elevated.     Lipid Panel   "        2/15/2023    11:06 6/22/2023    11:48 9/25/2023    11:36   Lipid Panel   Total Cholesterol   142    Total Cholesterol 139  155     Triglycerides 75  156  90    HDL Cholesterol 52  50  57    VLDL Cholesterol 15  27  17    LDL Cholesterol  72  78  68    LDL/HDL Ratio 1.4  1.6  1.18        Lab Results   Component Value Date     11/25/2023     11/24/2023    K 4.3 11/25/2023    K 4.6 11/24/2023     11/25/2023    CL 98 11/24/2023    CO2 26.6 11/25/2023    CO2 29.8 (H) 11/24/2023    BUN 17 11/25/2023    BUN 15 11/24/2023    CREATININE 0.79 11/25/2023    CREATININE 0.74 11/24/2023    EGFRIFNONA 89 11/12/2021    EGFRIFNONA 73 05/10/2021    EGFRIFAFRI 98 03/07/2019    EGFRIFAFRI 94 09/07/2018    GLUCOSE 107 (H) 11/25/2023    GLUCOSE 111 (H) 11/24/2023    CALCIUM 9.5 11/25/2023    CALCIUM 9.2 11/24/2023    PROTENTOTREF 6.9 06/22/2023    PROTENTOTREF 6.8 02/15/2023    ALBUMIN 3.6 11/24/2023    ALBUMIN 4.5 11/07/2023    BILITOT 0.2 11/07/2023    BILITOT 0.6 09/25/2023    AST 21 11/07/2023    AST 17 09/25/2023    ALT 17 11/07/2023    ALT 14 09/25/2023     Lab Results   Component Value Date    WBC 8.27 11/25/2023    WBC 9.59 11/24/2023    HGB 9.2 (L) 11/25/2023    HGB 9.4 (L) 11/24/2023    HCT 27.9 (L) 11/25/2023    HCT 28.3 (L) 11/24/2023    MCV 89.1 11/25/2023    MCV 86.3 11/24/2023     11/25/2023     11/24/2023     No results found for: \"PROBNP\", \"BNP\"  Lab Results   Component Value Date    TROPONINT 46 (H) 11/24/2023     Lab Results   Component Value Date    TSH 0.862 11/23/2023    TSH 0.927 09/25/2023             ASSESSMENT & PLAN     Diagnoses and all orders for this visit:    1. Paroxysmal atrial fibrillation (Primary)  Assessment & Plan:  Patient with a 5% AF burden on Zio monitor, HR was fast with AF episodes  Patient reports improvement of dyspnea when starting of metoprolol succinate  She is anticoagulated with apixiban and denies bleeding complications    CHADS-VASc Risk " Assessment              4 Total Score    1 Hypertension    2 Age >/= 75    1 Sex: Female        Criteria that do not apply:    CHF    DM    PRIOR STROKE/TIA/THROMBO    Vascular Disease    Age 65-74                2. Hypertension, unspecified type  Assessment & Plan:  BP controlled in the clinic at 122/80.  Continue the following regimen:  Metoprolol succinate 75 mg per day  HCTZ 12.5 mg per day          Return in about 6 months (around 7/4/2024) for Dr. Galindo- Routine.    Future Appointments         Provider Department Center    2/19/2024 10:15 AM Maicol Florentino MD [facility]    7/8/2024 1:20 PM Gt Galindo MD [facility] CARDI[facility]                    MEDICATIONS         Discharge Medications            Accurate as of January 4, 2024 12:27 PM. If you have any questions, ask your nurse or doctor.                Continue These Medications        Instructions Start Date   acetaminophen 325 MG tablet  Commonly known as: TYLENOL   650 mg, Oral, Daily PRN      amitriptyline 10 MG tablet  Commonly known as: ELAVIL   10 mg, Oral, Nightly PRN      apixaban 5 MG tablet tablet  Commonly known as: ELIQUIS   5 mg, Oral, Every 12 Hours Scheduled      cholecalciferol 25 MCG (1000 UT) tablet  Commonly known as: VITAMIN D3   1,000 Units, Oral, Daily, HOLD FOR SURGERY      coenzyme Q10 100 MG capsule   200 mg, Oral, Daily, HOLD FOR SURGERY      diphenhydrAMINE 25 mg capsule  Commonly known as: BENADRYL   25 mg, Oral, Every 6 Hours PRN      docusate sodium 250 MG capsule  Commonly known as: COLACE   250 mg, Oral, 2 Times Daily PRN      hydroCHLOROthiazide 25 MG tablet  Commonly known as: HYDRODIURIL   12.5 mg, Oral, Daily      metoprolol succinate XL 50 MG 24 hr tablet  Commonly known as: TOPROL-XL   75 mg, Oral, Daily      oxyCODONE-acetaminophen 5-325 MG per tablet  Commonly known as: PERCOCET   1-2 tablets, Oral, Every 4 Hours PRN      pravastatin 40 MG  tablet  Commonly known as: PRAVACHOL   40 mg, Oral, Nightly                   **Dragon Disclaimer:   Much of this encounter note is an electronic transcription/translation of spoken language to printed text. The electronic translation of spoken language may permit erroneous, or at times, nonsensical words or phrases to be inadvertently transcribed. Although I have reviewed the note for such errors, some may still exist.

## 2024-01-04 NOTE — ASSESSMENT & PLAN NOTE
Patient with a 5% AF burden on Zio monitor, HR was fast with AF episodes  Patient reports improvement of dyspnea when starting of metoprolol succinate  She is anticoagulated with apixiban and denies bleeding complications    CHADS-VASc Risk Assessment              4 Total Score    1 Hypertension    2 Age >/= 75    1 Sex: Female        Criteria that do not apply:    CHF    DM    PRIOR STROKE/TIA/THROMBO    Vascular Disease    Age 65-74

## 2024-01-04 NOTE — ASSESSMENT & PLAN NOTE
BP controlled in the clinic at 122/80.  Continue the following regimen:  Metoprolol succinate 75 mg per day  HCTZ 12.5 mg per day

## 2024-01-24 DIAGNOSIS — I10 HYPERTENSION, UNSPECIFIED TYPE: ICD-10-CM

## 2024-01-24 RX ORDER — HYDROCHLOROTHIAZIDE 25 MG/1
12.5 TABLET ORAL DAILY
Qty: 45 TABLET | Refills: 1 | Status: SHIPPED | OUTPATIENT
Start: 2024-01-24

## 2024-02-19 ENCOUNTER — OFFICE VISIT (OUTPATIENT)
Dept: INTERNAL MEDICINE | Facility: CLINIC | Age: 79
End: 2024-02-19
Payer: MEDICARE

## 2024-02-19 VITALS
WEIGHT: 120.3 LBS | OXYGEN SATURATION: 98 % | BODY MASS INDEX: 27.84 KG/M2 | HEIGHT: 55 IN | DIASTOLIC BLOOD PRESSURE: 68 MMHG | HEART RATE: 75 BPM | TEMPERATURE: 97.3 F | SYSTOLIC BLOOD PRESSURE: 134 MMHG

## 2024-02-19 DIAGNOSIS — I10 HYPERTENSION, UNSPECIFIED TYPE: ICD-10-CM

## 2024-02-19 DIAGNOSIS — I48.0 PAROXYSMAL ATRIAL FIBRILLATION: Primary | ICD-10-CM

## 2024-02-19 DIAGNOSIS — E78.5 HYPERLIPIDEMIA, UNSPECIFIED HYPERLIPIDEMIA TYPE: ICD-10-CM

## 2024-02-19 DIAGNOSIS — M17.11 PRIMARY OSTEOARTHRITIS OF RIGHT KNEE: ICD-10-CM

## 2024-02-19 RX ORDER — DICLOFENAC SODIUM 30 MG/G
GEL TOPICAL 2 TIMES DAILY
Qty: 100 G | Refills: 3 | Status: SHIPPED | OUTPATIENT
Start: 2024-02-19

## 2024-02-19 NOTE — PROGRESS NOTES
"Chief Complaint  5month follow up (Bp and cholesterol)    Subjective        Gaby Jackson presents to [facility]  History of Present Illness    Patient presents at today's visit with a history having hyperlipidemia.  She is currently on pravastatin 40 mg daily.  She denies any side effects of the medication.    She also has A-fib.  She has been started on Eliquis 5 mg twice a day.  She seems to be doing fairly well with that.  They also increased her metoprolol succinate XL to 75 mg daily.  She denies any side effects of the medication.    For the essential hypertension, she is taking metoprolol succinate XL 75 mg daily along with hydrochlorothiazide 12.5 mg daily.  She denies any side effects of the medicine.    Also has primary osteoarthritis in the right knee.  She did have it in the left knee, but did have the knee replaced last year.  However the right knee is progressively getting worse for her.    Objective   Vital Signs:  /68   Pulse 75   Temp 97.3 °F (36.3 °C)   Ht 63 cm (24.8\")   Wt 54.6 kg (120 lb 4.8 oz)   SpO2 98%   .49 kg/m²   Estimated body mass index is 137.49 kg/m² as calculated from the following:    Height as of this encounter: 63 cm (24.8\").    Weight as of this encounter: 54.6 kg (120 lb 4.8 oz).             Physical Exam  Vitals and nursing note reviewed.   Constitutional:       Appearance: She is well-developed.   HENT:      Head: Normocephalic and atraumatic.   Musculoskeletal:      Cervical back: Normal range of motion and neck supple.   Neurological:      Mental Status: She is alert and oriented to person, place, and time.   Psychiatric:         Behavior: Behavior normal.        Result Review :                     Assessment and Plan     Diagnoses and all orders for this visit:    1. Paroxysmal atrial fibrillation (Primary)  -     Comprehensive Metabolic Panel  -     CBC & Differential  -     Continue metoprolol succinate along with " Eliquis.    2. Hypertension, unspecified type  -     Comprehensive Metabolic Panel  -     CBC & Differential  -     Continue metoprolol succinate along with hydrochlorothiazide.    3. Hyperlipidemia, unspecified hyperlipidemia type  -     Lipid Panel With LDL / HDL Ratio  -     Continue pravastatin.    4. Primary osteoarthritis of right knee  -     Diclofenac Sodium 3 % gel gel; Apply  topically to the appropriate area as directed 2 (Two) Times a Day. for 60 days.  Dispense: 100 g; Refill: 3  -     Will start diclofenac gel.             Follow Up     No follow-ups on file.  Patient was given instructions and counseling regarding her condition or for health maintenance advice. Please see specific information pulled into the AVS if appropriate.

## 2024-02-20 LAB
ALBUMIN SERPL-MCNC: 4.6 G/DL (ref 3.5–5.2)
ALBUMIN/GLOB SERPL: 2.1 G/DL
ALP SERPL-CCNC: 129 U/L (ref 39–117)
ALT SERPL-CCNC: 20 U/L (ref 1–33)
AST SERPL-CCNC: 25 U/L (ref 1–32)
BASOPHILS # BLD AUTO: 0.03 10*3/MM3 (ref 0–0.2)
BASOPHILS NFR BLD AUTO: 0.6 % (ref 0–1.5)
BILIRUB SERPL-MCNC: 0.5 MG/DL (ref 0–1.2)
BUN SERPL-MCNC: 14 MG/DL (ref 8–23)
BUN/CREAT SERPL: 18.4 (ref 7–25)
CALCIUM SERPL-MCNC: 10 MG/DL (ref 8.6–10.5)
CHLORIDE SERPL-SCNC: 97 MMOL/L (ref 98–107)
CHOLEST SERPL-MCNC: 196 MG/DL (ref 0–200)
CO2 SERPL-SCNC: 29.7 MMOL/L (ref 22–29)
CREAT SERPL-MCNC: 0.76 MG/DL (ref 0.57–1)
EGFRCR SERPLBLD CKD-EPI 2021: 80.8 ML/MIN/1.73
EOSINOPHIL # BLD AUTO: 0.03 10*3/MM3 (ref 0–0.4)
EOSINOPHIL NFR BLD AUTO: 0.6 % (ref 0.3–6.2)
ERYTHROCYTE [DISTWIDTH] IN BLOOD BY AUTOMATED COUNT: 12.7 % (ref 12.3–15.4)
GLOBULIN SER CALC-MCNC: 2.2 GM/DL
GLUCOSE SERPL-MCNC: 84 MG/DL (ref 65–99)
HCT VFR BLD AUTO: 34.4 % (ref 34–46.6)
HDLC SERPL-MCNC: 60 MG/DL (ref 40–60)
HGB BLD-MCNC: 11.4 G/DL (ref 12–15.9)
IMM GRANULOCYTES # BLD AUTO: 0.02 10*3/MM3 (ref 0–0.05)
IMM GRANULOCYTES NFR BLD AUTO: 0.4 % (ref 0–0.5)
LDLC SERPL CALC-MCNC: 115 MG/DL (ref 0–100)
LDLC/HDLC SERPL: 1.88 {RATIO}
LYMPHOCYTES # BLD AUTO: 0.9 10*3/MM3 (ref 0.7–3.1)
LYMPHOCYTES NFR BLD AUTO: 16.6 % (ref 19.6–45.3)
MCH RBC QN AUTO: 27.8 PG (ref 26.6–33)
MCHC RBC AUTO-ENTMCNC: 33.1 G/DL (ref 31.5–35.7)
MCV RBC AUTO: 83.9 FL (ref 79–97)
MONOCYTES # BLD AUTO: 0.69 10*3/MM3 (ref 0.1–0.9)
MONOCYTES NFR BLD AUTO: 12.7 % (ref 5–12)
NEUTROPHILS # BLD AUTO: 3.75 10*3/MM3 (ref 1.7–7)
NEUTROPHILS NFR BLD AUTO: 69.1 % (ref 42.7–76)
NRBC BLD AUTO-RTO: 0 /100 WBC (ref 0–0.2)
PLATELET # BLD AUTO: 287 10*3/MM3 (ref 140–450)
POTASSIUM SERPL-SCNC: 3.9 MMOL/L (ref 3.5–5.2)
PROT SERPL-MCNC: 6.8 G/DL (ref 6–8.5)
RBC # BLD AUTO: 4.1 10*6/MM3 (ref 3.77–5.28)
SODIUM SERPL-SCNC: 139 MMOL/L (ref 136–145)
TRIGL SERPL-MCNC: 117 MG/DL (ref 0–150)
VLDLC SERPL CALC-MCNC: 21 MG/DL (ref 5–40)
WBC # BLD AUTO: 5.42 10*3/MM3 (ref 3.4–10.8)

## 2024-03-15 DIAGNOSIS — F51.01 PRIMARY INSOMNIA: ICD-10-CM

## 2024-03-15 RX ORDER — AMITRIPTYLINE HYDROCHLORIDE 10 MG/1
10 TABLET, FILM COATED ORAL NIGHTLY PRN
Qty: 90 TABLET | Refills: 2 | Status: SHIPPED | OUTPATIENT
Start: 2024-03-15

## 2024-05-20 ENCOUNTER — LAB (OUTPATIENT)
Dept: LAB | Facility: HOSPITAL | Age: 79
End: 2024-05-20
Payer: MEDICARE

## 2024-05-20 ENCOUNTER — OFFICE VISIT (OUTPATIENT)
Dept: INTERNAL MEDICINE | Facility: CLINIC | Age: 79
End: 2024-05-20
Payer: MEDICARE

## 2024-05-20 VITALS
OXYGEN SATURATION: 95 % | DIASTOLIC BLOOD PRESSURE: 80 MMHG | HEIGHT: 63 IN | RESPIRATION RATE: 12 BRPM | SYSTOLIC BLOOD PRESSURE: 130 MMHG | WEIGHT: 124 LBS | HEART RATE: 61 BPM | BODY MASS INDEX: 21.97 KG/M2

## 2024-05-20 DIAGNOSIS — I10 HYPERTENSION, UNSPECIFIED TYPE: ICD-10-CM

## 2024-05-20 DIAGNOSIS — I48.0 PAROXYSMAL ATRIAL FIBRILLATION: ICD-10-CM

## 2024-05-20 DIAGNOSIS — Z12.11 SCREEN FOR COLON CANCER: ICD-10-CM

## 2024-05-20 DIAGNOSIS — E78.5 HYPERLIPIDEMIA, UNSPECIFIED HYPERLIPIDEMIA TYPE: Primary | ICD-10-CM

## 2024-05-20 LAB
ALBUMIN SERPL-MCNC: 4.5 G/DL (ref 3.5–5.2)
ALBUMIN/GLOB SERPL: 2 G/DL
ALP SERPL-CCNC: 124 U/L (ref 39–117)
ALT SERPL W P-5'-P-CCNC: 14 U/L (ref 1–33)
ANION GAP SERPL CALCULATED.3IONS-SCNC: 10 MMOL/L (ref 5–15)
AST SERPL-CCNC: 15 U/L (ref 1–32)
BASOPHILS # BLD AUTO: 0.04 10*3/MM3 (ref 0–0.2)
BASOPHILS NFR BLD AUTO: 0.7 % (ref 0–1.5)
BILIRUB SERPL-MCNC: 0.2 MG/DL (ref 0–1.2)
BUN SERPL-MCNC: 21 MG/DL (ref 8–23)
BUN/CREAT SERPL: 25 (ref 7–25)
CALCIUM SPEC-SCNC: 9.8 MG/DL (ref 8.6–10.5)
CHLORIDE SERPL-SCNC: 104 MMOL/L (ref 98–107)
CHOLEST SERPL-MCNC: 164 MG/DL (ref 0–200)
CO2 SERPL-SCNC: 28 MMOL/L (ref 22–29)
CREAT SERPL-MCNC: 0.84 MG/DL (ref 0.57–1)
DEPRECATED RDW RBC AUTO: 40 FL (ref 37–54)
EGFRCR SERPLBLD CKD-EPI 2021: 71.7 ML/MIN/1.73
EOSINOPHIL # BLD AUTO: 0.04 10*3/MM3 (ref 0–0.4)
EOSINOPHIL NFR BLD AUTO: 0.7 % (ref 0.3–6.2)
ERYTHROCYTE [DISTWIDTH] IN BLOOD BY AUTOMATED COUNT: 12.9 % (ref 12.3–15.4)
GLOBULIN UR ELPH-MCNC: 2.3 GM/DL
GLUCOSE SERPL-MCNC: 77 MG/DL (ref 65–99)
HCT VFR BLD AUTO: 34.6 % (ref 34–46.6)
HDLC SERPL-MCNC: 56 MG/DL (ref 40–60)
HGB BLD-MCNC: 11.3 G/DL (ref 12–15.9)
IMM GRANULOCYTES # BLD AUTO: 0.01 10*3/MM3 (ref 0–0.05)
IMM GRANULOCYTES NFR BLD AUTO: 0.2 % (ref 0–0.5)
LDLC SERPL CALC-MCNC: 90 MG/DL (ref 0–100)
LDLC/HDLC SERPL: 1.58 {RATIO}
LYMPHOCYTES # BLD AUTO: 0.88 10*3/MM3 (ref 0.7–3.1)
LYMPHOCYTES NFR BLD AUTO: 15.6 % (ref 19.6–45.3)
MCH RBC QN AUTO: 28.3 PG (ref 26.6–33)
MCHC RBC AUTO-ENTMCNC: 32.7 G/DL (ref 31.5–35.7)
MCV RBC AUTO: 86.5 FL (ref 79–97)
MONOCYTES # BLD AUTO: 0.6 10*3/MM3 (ref 0.1–0.9)
MONOCYTES NFR BLD AUTO: 10.7 % (ref 5–12)
NEUTROPHILS NFR BLD AUTO: 4.06 10*3/MM3 (ref 1.7–7)
NEUTROPHILS NFR BLD AUTO: 72.1 % (ref 42.7–76)
NRBC BLD AUTO-RTO: 0 /100 WBC (ref 0–0.2)
PLATELET # BLD AUTO: 288 10*3/MM3 (ref 140–450)
PMV BLD AUTO: 10.6 FL (ref 6–12)
POTASSIUM SERPL-SCNC: 4 MMOL/L (ref 3.5–5.2)
PROT SERPL-MCNC: 6.8 G/DL (ref 6–8.5)
RBC # BLD AUTO: 4 10*6/MM3 (ref 3.77–5.28)
SODIUM SERPL-SCNC: 142 MMOL/L (ref 136–145)
TRIGL SERPL-MCNC: 99 MG/DL (ref 0–150)
VLDLC SERPL-MCNC: 18 MG/DL (ref 5–40)
WBC NRBC COR # BLD AUTO: 5.63 10*3/MM3 (ref 3.4–10.8)

## 2024-05-20 PROCEDURE — 99214 OFFICE O/P EST MOD 30 MIN: CPT | Performed by: FAMILY MEDICINE

## 2024-05-20 PROCEDURE — 36415 COLL VENOUS BLD VENIPUNCTURE: CPT | Performed by: FAMILY MEDICINE

## 2024-05-20 PROCEDURE — 80053 COMPREHEN METABOLIC PANEL: CPT | Performed by: FAMILY MEDICINE

## 2024-05-20 PROCEDURE — 1159F MED LIST DOCD IN RCRD: CPT | Performed by: FAMILY MEDICINE

## 2024-05-20 PROCEDURE — 3079F DIAST BP 80-89 MM HG: CPT | Performed by: FAMILY MEDICINE

## 2024-05-20 PROCEDURE — 80061 LIPID PANEL: CPT | Performed by: FAMILY MEDICINE

## 2024-05-20 PROCEDURE — 85025 COMPLETE CBC W/AUTO DIFF WBC: CPT | Performed by: FAMILY MEDICINE

## 2024-05-20 PROCEDURE — 3075F SYST BP GE 130 - 139MM HG: CPT | Performed by: FAMILY MEDICINE

## 2024-05-20 PROCEDURE — 1126F AMNT PAIN NOTED NONE PRSNT: CPT | Performed by: FAMILY MEDICINE

## 2024-05-20 PROCEDURE — 1160F RVW MEDS BY RX/DR IN RCRD: CPT | Performed by: FAMILY MEDICINE

## 2024-05-20 RX ORDER — HYDROCHLOROTHIAZIDE 25 MG/1
12.5 TABLET ORAL DAILY
Qty: 45 TABLET | Refills: 1 | Status: SHIPPED | OUTPATIENT
Start: 2024-05-20 | End: 2024-05-20 | Stop reason: SDUPTHER

## 2024-05-20 RX ORDER — METOPROLOL SUCCINATE 50 MG/1
75 TABLET, EXTENDED RELEASE ORAL DAILY
Qty: 135 TABLET | Refills: 3 | Status: SHIPPED | OUTPATIENT
Start: 2024-05-20

## 2024-05-20 RX ORDER — METOPROLOL SUCCINATE 50 MG/1
75 TABLET, EXTENDED RELEASE ORAL DAILY
Qty: 135 TABLET | Refills: 3 | Status: SHIPPED | OUTPATIENT
Start: 2024-05-20 | End: 2024-05-20 | Stop reason: SDUPTHER

## 2024-05-20 RX ORDER — ROSUVASTATIN CALCIUM 20 MG/1
20 TABLET, COATED ORAL DAILY
Qty: 90 TABLET | Refills: 3 | Status: SHIPPED | OUTPATIENT
Start: 2024-05-20 | End: 2024-05-20 | Stop reason: SDUPTHER

## 2024-05-20 RX ORDER — HYDROCHLOROTHIAZIDE 25 MG/1
12.5 TABLET ORAL DAILY
Qty: 45 TABLET | Refills: 1 | Status: SHIPPED | OUTPATIENT
Start: 2024-05-20

## 2024-05-20 RX ORDER — ROSUVASTATIN CALCIUM 20 MG/1
20 TABLET, COATED ORAL DAILY
Qty: 90 TABLET | Refills: 3 | Status: SHIPPED | OUTPATIENT
Start: 2024-05-20

## 2024-05-21 NOTE — PROGRESS NOTES
"Chief Complaint  Atrial Fibrillation    Subjective          Gaby Jackson presents to [facility]  History of Present Illness  The patient is a 78-year-old female who presents for evaluation of multiple medical concerns.    The patient is currently on a regimen of rosuvastatin 20 mg, which was initiated due to a persistent cough caused by pravastatin. We would like to keep her on that medication.    Patient has essential hypertension.  Today's blood pressure 130/80.  She states hydrochlorothiazide 12.5 mg daily along with metoprolol succinate XL 75 mg daily.  She denies any side effects of the medicine.        Objective   Vital Signs:   /80 (BP Location: Left arm, Patient Position: Sitting, Cuff Size: Adult)   Pulse 61   Resp 12   Ht 160 cm (63\")   Wt 56.2 kg (124 lb)   SpO2 95%   BMI 21.97 kg/m²     Physical Exam  Vitals and nursing note reviewed.   Constitutional:       Appearance: She is well-developed.   HENT:      Head: Normocephalic and atraumatic.   Musculoskeletal:      Cervical back: Normal range of motion and neck supple.   Neurological:      Mental Status: She is alert and oriented to person, place, and time.   Psychiatric:         Behavior: Behavior normal.         Physical Exam       Result Review :                 Assessment and Plan    Diagnoses and all orders for this visit:    1. Hyperlipidemia, unspecified hyperlipidemia type (Primary)  -     Discontinue: rosuvastatin (Crestor) 20 MG tablet; Take 1 tablet by mouth Daily.  Dispense: 90 tablet; Refill: 3  -     Lipid Panel With LDL / HDL Ratio  -     rosuvastatin (Crestor) 20 MG tablet; Take 1 tablet by mouth Daily.  Dispense: 90 tablet; Refill: 3    2. Hypertension, unspecified type  -     Discontinue: hydroCHLOROthiazide 25 MG tablet; Take 0.5 tablets by mouth Daily.  Dispense: 45 tablet; Refill: 1  -     Discontinue: metoprolol succinate XL (TOPROL-XL) 50 MG 24 hr tablet; Take 1.5 tablets by mouth Daily.  " Dispense: 135 tablet; Refill: 3  -     CBC & Differential  -     Comprehensive Metabolic Panel  -     metoprolol succinate XL (TOPROL-XL) 50 MG 24 hr tablet; Take 1.5 tablets by mouth Daily.  Dispense: 135 tablet; Refill: 3  -     hydroCHLOROthiazide 25 MG tablet; Take 0.5 tablets by mouth Daily.  Dispense: 45 tablet; Refill: 1    3. Paroxysmal atrial fibrillation    4. Screen for colon cancer  -     Cologuard - Stool, Per Rectum; Future      Assessment & Plan  1. Hypercholesterolemia.  The patient's pravastatin medication has been discontinued, and a prescription for Crestor 20 mg has been issued.    2.  Hypertension  Continue metoprolol succinate XL 75 mg daily along with hydrochlorothiazide 12.5 mg daily.    3. Health maintenance.  The patient is due for a colonoscopy. A Cologuard test has been ordered.    Follow Up   No follow-ups on file.  Patient was given instructions and counseling regarding her condition or for health maintenance advice. Please see specific information pulled into the AVS if appropriate.           Patient or patient representative verbalized consent for the use of Ambient Listening during the visit with  Maicol Florentino MD for chart documentation. 5/21/2024  07:54 EDT

## 2024-06-10 DIAGNOSIS — R19.5 POSITIVE COLORECTAL CANCER SCREENING USING COLOGUARD TEST: Primary | ICD-10-CM

## 2024-07-08 ENCOUNTER — OFFICE VISIT (OUTPATIENT)
Dept: CARDIOLOGY | Facility: CLINIC | Age: 79
End: 2024-07-08
Payer: MEDICARE

## 2024-07-08 VITALS
BODY MASS INDEX: 22.93 KG/M2 | HEART RATE: 58 BPM | WEIGHT: 129.4 LBS | RESPIRATION RATE: 16 BRPM | HEIGHT: 63 IN | DIASTOLIC BLOOD PRESSURE: 70 MMHG | OXYGEN SATURATION: 98 % | SYSTOLIC BLOOD PRESSURE: 138 MMHG

## 2024-07-08 DIAGNOSIS — I48.0 PAROXYSMAL ATRIAL FIBRILLATION: Primary | ICD-10-CM

## 2024-07-08 DIAGNOSIS — I10 HYPERTENSION, UNSPECIFIED TYPE: ICD-10-CM

## 2024-07-08 PROCEDURE — G2211 COMPLEX E/M VISIT ADD ON: HCPCS | Performed by: INTERNAL MEDICINE

## 2024-07-08 PROCEDURE — 3075F SYST BP GE 130 - 139MM HG: CPT | Performed by: INTERNAL MEDICINE

## 2024-07-08 PROCEDURE — 3078F DIAST BP <80 MM HG: CPT | Performed by: INTERNAL MEDICINE

## 2024-07-08 PROCEDURE — 99214 OFFICE O/P EST MOD 30 MIN: CPT | Performed by: INTERNAL MEDICINE

## 2024-07-08 NOTE — PROGRESS NOTES
"      CARDIOLOGY    Gt Galindo MD    ENCOUNTER DATE:  07/08/2024    Gaby Jackson / 78 y.o. / female        CHIEF COMPLAINT / REASON FOR OFFICE VISIT     Follow-up (6 mths)  Hypertension  Paroxysmal atrial fibrillation    HISTORY OF PRESENT ILLNESS       HPI  Gaby Jackson is a 78 y.o. female who presents today for reevaluation.  Overall she has been doing great.  She said 1 time while walking stairs she felt short of breath but was just 1 episode.  Overall she is felt great no issues.      The following portions of the patient's history were reviewed and updated as appropriate: allergies, current medications, past family history, past medical history, past social history, past surgical history and problem list.      VITAL SIGNS     Visit Vitals  /70 (BP Location: Left arm, Patient Position: Sitting, Cuff Size: Adult)   Pulse 58   Resp 16   Ht 160 cm (63\")   Wt 58.7 kg (129 lb 6.4 oz)   SpO2 98%   BMI 22.92 kg/m²         Wt Readings from Last 3 Encounters:   07/08/24 58.7 kg (129 lb 6.4 oz)   05/20/24 56.2 kg (124 lb)   02/19/24 54.6 kg (120 lb 4.8 oz)     Body mass index is 22.92 kg/m².      REVIEW OF SYSTEMS   ROS        PHYSICAL EXAMINATION     Vitals reviewed.   Constitutional:       Appearance: Healthy appearance.   Pulmonary:      Effort: Pulmonary effort is normal.   Cardiovascular:      Normal rate. Regular rhythm. Normal S1. Normal S2.       Murmurs: There is no murmur.      No gallop.  No click. No rub.   Pulses:     Intact distal pulses.   Edema:     Peripheral edema absent.   Neurological:      Mental Status: Alert.           REVIEWED DATA     Procedures    Cardiac Procedures:      Lipid Panel          9/25/2023    11:36 2/19/2024    10:58 5/20/2024    10:32   Lipid Panel   Total Cholesterol 142   164    Total Cholesterol  196     Triglycerides 90  117  99    HDL Cholesterol 57  60  56    VLDL Cholesterol 17  21  18    LDL Cholesterol  68  115  90    LDL/HDL Ratio 1.18  1.88  1.58  "         ASSESSMENT & PLAN      Diagnosis Plan   1. Paroxysmal atrial fibrillation        2. Hypertension, unspecified type              SUMMARY/DISCUSSION  Paroxysmal atrial fibrillation.  Overall she is doing well she is on appropriate medications continue same.  Hypertension blood pressures good  Continue same follow-up in 1 year continue to provide longitudinal care if she has any further atrial fibrillation she was told to contact me.        MEDICATIONS         Discharge Medications            Accurate as of July 8, 2024  2:18 PM. If you have any questions, ask your nurse or doctor.                Continue These Medications        Instructions Start Date   acetaminophen 325 MG tablet  Commonly known as: TYLENOL   650 mg, Oral, Daily PRN      amitriptyline 10 MG tablet  Commonly known as: ELAVIL   10 mg, Oral, Nightly PRN      apixaban 5 MG tablet tablet  Commonly known as: ELIQUIS   5 mg, Oral, Every 12 Hours Scheduled      cholecalciferol 25 MCG (1000 UT) tablet  Commonly known as: VITAMIN D3   1,000 Units, Oral, Daily, HOLD FOR SURGERY      coenzyme Q10 100 MG capsule   100 mg, Oral, Daily, HOLD FOR SURGERY      Diclofenac Sodium 3 % gel gel   Topical, 2 Times Daily, for 60 days.      diphenhydrAMINE 25 mg capsule  Commonly known as: BENADRYL   25 mg, Oral, Every 6 Hours PRN      docusate sodium 250 MG capsule  Commonly known as: COLACE   250 mg, Oral, 2 Times Daily PRN      hydroCHLOROthiazide 25 MG tablet   12.5 mg, Oral, Daily      metoprolol succinate XL 50 MG 24 hr tablet  Commonly known as: TOPROL-XL   75 mg, Oral, Daily      rosuvastatin 20 MG tablet  Commonly known as: Crestor   20 mg, Oral, Daily                   **Dragon Disclaimer:   Much of this encounter note is an electronic transcription/translation of spoken language to printed text. The electronic translation of spoken language may permit erroneous, or at times, nonsensical words or phrases to be inadvertently transcribed. Although I have  reviewed the note for such errors, some may still exist.

## 2025-01-23 DIAGNOSIS — I10 HYPERTENSION, UNSPECIFIED TYPE: ICD-10-CM

## 2025-01-23 RX ORDER — HYDROCHLOROTHIAZIDE 25 MG/1
12.5 TABLET ORAL DAILY
Qty: 45 TABLET | Refills: 1 | Status: SHIPPED | OUTPATIENT
Start: 2025-01-23

## 2025-07-01 NOTE — PROGRESS NOTES
RM:________     PCP: Maicol Florentino MD Last EKG : 2023     : 1945  AGE: 79 y.o.    REASON FOR VISIT: __________________________________________    ____________________________________________________________                                                   Wt Readings from Last 3 Encounters:   24 58.7 kg (129 lb 6.4 oz)   24 56.2 kg (124 lb)   24 54.6 kg (120 lb 4.8 oz)      BP Readings from Last 3 Encounters:   24 138/70   24 130/80   24 134/68      WT: ____________ HT: ______ BP: __________ HR ______   02% _______               Lipid: 2024

## 2025-07-24 DIAGNOSIS — I10 HYPERTENSION, UNSPECIFIED TYPE: ICD-10-CM

## 2025-07-25 RX ORDER — HYDROCHLOROTHIAZIDE 25 MG/1
12.5 TABLET ORAL DAILY
Qty: 45 TABLET | Refills: 1 | OUTPATIENT
Start: 2025-07-25

## 2025-07-25 NOTE — TELEPHONE ENCOUNTER
Rx Refill Note  Requested Prescriptions     Pending Prescriptions Disp Refills    hydroCHLOROthiazide 25 MG tablet [Pharmacy Med Name: HYDROCHLOROTHIAZIDE 25 MG TAB] 45 tablet 1     Sig: TAKE 1/2 TABLET BY MOUTH DAILY      Last office visit with prescribing clinician: 5/20/2024   Last telemedicine visit with prescribing clinician: Visit date not found   Next office visit with prescribing clinician: Visit date not found       Brynn Swanson  07/25/25, 10:35 EDT

## 2025-07-30 ENCOUNTER — OFFICE VISIT (OUTPATIENT)
Dept: CARDIOLOGY | Age: 80
End: 2025-07-30
Payer: MEDICARE

## 2025-07-30 VITALS
DIASTOLIC BLOOD PRESSURE: 70 MMHG | HEIGHT: 63 IN | OXYGEN SATURATION: 98 % | HEART RATE: 54 BPM | BODY MASS INDEX: 22.86 KG/M2 | SYSTOLIC BLOOD PRESSURE: 148 MMHG | WEIGHT: 129 LBS

## 2025-07-30 DIAGNOSIS — I10 HYPERTENSION, UNSPECIFIED TYPE: ICD-10-CM

## 2025-07-30 DIAGNOSIS — E78.5 HYPERLIPIDEMIA, UNSPECIFIED HYPERLIPIDEMIA TYPE: ICD-10-CM

## 2025-07-30 DIAGNOSIS — I48.0 PAROXYSMAL ATRIAL FIBRILLATION: Primary | ICD-10-CM

## 2025-07-30 PROCEDURE — 93000 ELECTROCARDIOGRAM COMPLETE: CPT | Performed by: NURSE PRACTITIONER

## 2025-07-30 PROCEDURE — 3077F SYST BP >= 140 MM HG: CPT | Performed by: NURSE PRACTITIONER

## 2025-07-30 PROCEDURE — 3078F DIAST BP <80 MM HG: CPT | Performed by: NURSE PRACTITIONER

## 2025-07-30 PROCEDURE — 99214 OFFICE O/P EST MOD 30 MIN: CPT | Performed by: NURSE PRACTITIONER

## 2025-07-30 NOTE — ASSESSMENT & PLAN NOTE
Currently in sinus rhythm  Asymptomatic  Continue metoprolol succinate 75 mg/day  Anticoagulated with apixaban 5 mg twice daily

## 2025-07-30 NOTE — PROGRESS NOTES
CARDIOLOGY        Patient Name: Gaby Jackson  :1945  Age: 79 y.o.  Primary Cardiologist: Gt Galindo MD  Encounter Provider:  ZULEYMA Paris    Date of Service: 25        CHIEF COMPLAINT / REASON FOR OFFICE VISIT     Follow-up (Yearly) and Atrial Fibrillation      HPI  Gaby Jackson is a 79 y.o. female who presents today for annual assessment.    Pt has a  history significant for hyperlipidemia, hypertension, PAF.    Patient reports that she has done well since last assessment.  She is tolerating all medications without any adverse effects.  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  She also exercises in the form of walking her dogs daily.  She denies any chest pain, dyspnea at rest, dyspnea with exertion, lightheadedness.    HISTORY OF PRESENT ILLNESS       Patient establish care with Dr. Galindo during a consultation during hospitalization in 2023.  Patient presented for elective total knee replacement.  Patient was planned to be discharged and was noted to have elevated heart rates, ECG revealed new onset atrial fibrillation.  At the time of consult patient was back in normal sinus rhythm.  Patient had an echocardiogram which revealed preserved LVEF with no significant valvular stenosis or regurgitation.  Patient had an outpatient ZIO monitor which revealed a 5% A-fib burden.  Patient is anticoagulated with apixaban.  She was also placed on metoprolol succinate for rate control as patient's rate is often rapid when she goes into atrial fibrillation.    Echocardiogram 2023.    LVEF 61%.  Diastolic function was normal.  No valvular regurgitation or stenosis.     Zio monitor 2023.  AF burden of 5%.  When patient was in AF, HR elevated.     The following portions of the patient's history were reviewed and updated as appropriate: allergies, current medications, past family history, past  "medical history, past social history, past surgical history and problem list.      VITAL SIGNS     Visit Vitals  /70 (BP Location: Left arm, Patient Position: Sitting, Cuff Size: Adult)   Pulse 54   Ht 160 cm (62.99\")   Wt 58.5 kg (129 lb)   SpO2 98%   BMI 22.86 kg/m²         Wt Readings from Last 3 Encounters:   07/30/25 58.5 kg (129 lb)   07/08/24 58.7 kg (129 lb 6.4 oz)   05/20/24 56.2 kg (124 lb)     Body mass index is 22.86 kg/m².      REVIEW OF SYSTEMS   Review of Systems   Constitutional: Negative for chills, fever, weight gain and weight loss.   Cardiovascular:  Negative for leg swelling.   Respiratory:  Negative for cough, snoring and wheezing.    Hematologic/Lymphatic: Negative for bleeding problem. Does not bruise/bleed easily.   Skin:  Negative for color change.   Musculoskeletal:  Negative for falls, joint pain and myalgias.   Gastrointestinal:  Negative for melena.   Genitourinary:  Negative for hematuria.   Neurological:  Negative for excessive daytime sleepiness.   Psychiatric/Behavioral:  Negative for depression. The patient is not nervous/anxious.            PHYSICAL EXAMINATION     Vitals and nursing note reviewed.   Constitutional:       Appearance: Normal appearance. Well-developed.   Eyes:      Conjunctiva/sclera: Conjunctivae normal.   Neck:      Vascular: No carotid bruit.   Pulmonary:      Breath sounds: Normal breath sounds.   Cardiovascular:      Normal rate. Regular rhythm. Normal S1 with normal intensity. Normal S2 with normal intensity.       Murmurs: There is no murmur.      No gallop.  No click. No rub.   Edema:     Peripheral edema absent.   Musculoskeletal: Normal range of motion. Skin:     General: Skin is warm and dry.   Neurological:      Mental Status: Alert and oriented to person, place, and time.      GCS: GCS eye subscore is 4. GCS verbal subscore is 5. GCS motor subscore is 6.   Psychiatric:         Speech: Speech normal.         Behavior: Behavior normal.         " "Thought Content: Thought content normal.         Judgment: Judgment normal.           REVIEWED DATA       ECG 12 Lead    Date/Time: 7/30/2025 2:19 PM  Performed by: Silva Dowling APRN    Authorized by: Silva Dowling APRN  Comparison: compared with previous ECG from 11/23/2023  Rhythm: sinus rhythm  Rate: normal  BPM: 54  Conduction: conduction normal  ST Segments: ST segments normal  T Waves: T waves normal  QRS axis: normal    Clinical impression: normal ECG              Lab Results   Component Value Date     05/20/2024     02/19/2024    K 4.0 05/20/2024    K 3.9 02/19/2024     05/20/2024    CL 97 (L) 02/19/2024    CO2 28.0 05/20/2024    CO2 29.7 (H) 02/19/2024    BUN 21 05/20/2024    BUN 14 02/19/2024    CREATININE 0.84 05/20/2024    CREATININE 0.76 02/19/2024    EGFRIFNONA 89 11/12/2021    EGFRIFNONA 73 05/10/2021    EGFRIFAFRI 98 03/07/2019    EGFRIFAFRI 94 09/07/2018    GLUCOSE 77 05/20/2024    GLUCOSE 84 02/19/2024    CALCIUM 9.8 05/20/2024    CALCIUM 10.0 02/19/2024    ALBUMIN 4.5 05/20/2024    ALBUMIN 4.6 02/19/2024    BILITOT 0.2 05/20/2024    BILITOT 0.5 02/19/2024    AST 15 05/20/2024    AST 25 02/19/2024    ALT 14 05/20/2024    ALT 20 02/19/2024     Lab Results   Component Value Date    WBC 5.63 05/20/2024    WBC 5.42 02/19/2024    HGB 11.3 (L) 05/20/2024    HGB 11.4 (L) 02/19/2024    HCT 34.6 05/20/2024    HCT 34.4 02/19/2024    MCV 86.5 05/20/2024    MCV 83.9 02/19/2024     05/20/2024     02/19/2024     No results found for: \"PROBNP\", \"BNP\"  Lab Results   Component Value Date    TROPONINT 46 (H) 11/24/2023     Lab Results   Component Value Date    TSH 0.862 11/23/2023    TSH 0.927 09/25/2023             ASSESSMENT & PLAN     Diagnoses and all orders for this visit:    1. Paroxysmal atrial fibrillation (Primary)  Assessment & Plan:  Currently in sinus rhythm  Asymptomatic  Continue metoprolol succinate 75 mg/day  Anticoagulated with apixaban 5 mg twice " daily    Orders:  -     ECG 12 Lead    2. Hyperlipidemia, unspecified hyperlipidemia type  Assessment & Plan:   Lipid abnormalities are stable    Plan:  Continue same medication/s without change.      Discussed medication dosage, use, side effects, and goals of treatment in detail.    Counseled patient on lifestyle modifications to help control hyperlipidemia.     Patient Treatment Goals:   LDL goal is less than 70    Followup in 1 year.      3. Hypertension, unspecified type  Assessment & Plan:  Hypertension is stable and controlled  Continue current treatment regimen.  Dietary sodium restriction.  Regular aerobic exercise.  Ambulatory blood pressure monitoring.  Blood pressure will be reassessed in 1 year.          Return in about 6 months (around 1/30/2026) for Dr. Galindo- Routine.    Future Appointments         Provider Department Center    1/30/2026 2:20 PM (Arrive by 2:05 PM) Gt Galindo MD [facility]                      MEDICATIONS         Discharge Medications            Accurate as of July 30, 2025  3:03 PM. If you have any questions, ask your nurse or doctor.                Continue These Medications        Instructions Start Date   acetaminophen 325 MG tablet  Commonly known as: TYLENOL   650 mg, Daily PRN      amitriptyline 10 MG tablet  Commonly known as: ELAVIL   10 mg, Oral, Nightly PRN      apixaban 5 MG tablet tablet  Commonly known as: ELIQUIS   5 mg, Oral, Every 12 Hours Scheduled      cholecalciferol 25 MCG (1000 UT) tablet  Commonly known as: VITAMIN D3   1,000 Units, Daily      coenzyme Q10 100 MG capsule   100 mg, Daily      Diclofenac Sodium 3 % gel gel   Topical, 2 Times Daily, for 60 days.      diphenhydrAMINE 25 mg capsule  Commonly known as: BENADRYL   25 mg, Every 6 Hours PRN      docusate sodium 250 MG capsule  Commonly known as: COLACE   250 mg, Oral, 2 Times Daily PRN      hydroCHLOROthiazide 25 MG tablet   12.5 mg, Oral, Daily       metoprolol succinate XL 50 MG 24 hr tablet  Commonly known as: TOPROL-XL   75 mg, Oral, Daily      rosuvastatin 20 MG tablet  Commonly known as: Crestor   20 mg, Oral, Daily                   **Dragon Disclaimer:   Much of this encounter note is an electronic transcription/translation of spoken language to printed text. The electronic translation of spoken language may permit erroneous, or at times, nonsensical words or phrases to be inadvertently transcribed. Although I have reviewed the note for such errors, some may still exist.

## (undated) DEVICE — KT DRN EVAC WND PVC PCH WTROC RND 10F400

## (undated) DEVICE — BNDG ELAS ELITE V/CLOSE 4IN 5YD LF STRL

## (undated) DEVICE — GLV SURG BIOGEL LTX PF 7 1/2

## (undated) DEVICE — SYRINGE, LUER LOCK, 60ML: Brand: MEDLINE

## (undated) DEVICE — DUAL CUT SAGITTAL BLADE

## (undated) DEVICE — PATIENT RETURN ELECTRODE, SINGLE-USE, CONTACT QUALITY MONITORING, ADULT, WITH 9FT CORD, FOR PATIENTS WEIGING OVER 33LBS. (15KG): Brand: MEGADYNE

## (undated) DEVICE — PK KN TOTL 40

## (undated) DEVICE — ANTIBACTERIAL UNDYED BRAIDED (POLYGLACTIN 910), SYNTHETIC ABSORBABLE SUTURE: Brand: COATED VICRYL

## (undated) DEVICE — DRAPE,REIN 53X77,STERILE: Brand: MEDLINE

## (undated) DEVICE — UNDERCAST PADDING: Brand: DEROYAL

## (undated) DEVICE — CONTAINER,SPECIMEN,OR STERILE,4OZ: Brand: MEDLINE

## (undated) DEVICE — DRSNG GZ PETROLTM XEROFORM CURAD 1X8IN STRL

## (undated) DEVICE — GLV SURG PREMIERPRO ORTHO LTX PF SZ7.5 BRN

## (undated) DEVICE — PAD,ABDOMINAL,8"X10",ST,LF: Brand: MEDLINE

## (undated) DEVICE — DECANTER BAG 9": Brand: MEDLINE INDUSTRIES, INC.

## (undated) DEVICE — STPLR SKIN VISISTAT WD 35CT

## (undated) DEVICE — NEEDLE, QUINCKE, 20GX3.5": Brand: MEDLINE

## (undated) DEVICE — APPL CHLORAPREP HI/LITE 26ML ORNG

## (undated) DEVICE — PENCL ES MEGADINE EZ/CLEAN BUTN W/HOLSTR 10FT